# Patient Record
Sex: MALE | Race: WHITE | Employment: OTHER | URBAN - METROPOLITAN AREA
[De-identification: names, ages, dates, MRNs, and addresses within clinical notes are randomized per-mention and may not be internally consistent; named-entity substitution may affect disease eponyms.]

---

## 2022-10-11 ENCOUNTER — HOSPITAL ENCOUNTER (INPATIENT)
Age: 77
LOS: 4 days | Discharge: ANOTHER HOSPITAL | DRG: 885 | End: 2022-10-15
Attending: PSYCHIATRY & NEUROLOGY | Admitting: PSYCHIATRY & NEUROLOGY
Payer: MEDICARE

## 2022-10-11 PROBLEM — F25.9 SCHIZOAFFECTIVE DISORDER (HCC): Status: ACTIVE | Noted: 2022-10-11

## 2022-10-11 LAB
INFLUENZA A: NOT DETECTED
INFLUENZA B: NOT DETECTED
SARS-COV-2 RNA, RT PCR: NOT DETECTED
TSH SERPL DL<=0.05 MIU/L-ACNC: 1.82 UIU/ML (ref 0.27–4.2)

## 2022-10-11 PROCEDURE — 83036 HEMOGLOBIN GLYCOSYLATED A1C: CPT

## 2022-10-11 PROCEDURE — 36415 COLL VENOUS BLD VENIPUNCTURE: CPT

## 2022-10-11 PROCEDURE — 87636 SARSCOV2 & INF A&B AMP PRB: CPT

## 2022-10-11 PROCEDURE — 93005 ELECTROCARDIOGRAM TRACING: CPT | Performed by: PSYCHIATRY & NEUROLOGY

## 2022-10-11 PROCEDURE — 80061 LIPID PANEL: CPT

## 2022-10-11 PROCEDURE — 84443 ASSAY THYROID STIM HORMONE: CPT

## 2022-10-11 PROCEDURE — 1240000000 HC EMOTIONAL WELLNESS R&B

## 2022-10-11 PROCEDURE — 6370000000 HC RX 637 (ALT 250 FOR IP): Performed by: PSYCHIATRY & NEUROLOGY

## 2022-10-11 RX ORDER — POTASSIUM CHLORIDE 20 MEQ/1
20 TABLET, EXTENDED RELEASE ORAL 2 TIMES DAILY
Status: DISCONTINUED | OUTPATIENT
Start: 2022-10-11 | End: 2022-10-12

## 2022-10-11 RX ORDER — MAGNESIUM HYDROXIDE/ALUMINUM HYDROXICE/SIMETHICONE 120; 1200; 1200 MG/30ML; MG/30ML; MG/30ML
30 SUSPENSION ORAL EVERY 6 HOURS PRN
Status: DISCONTINUED | OUTPATIENT
Start: 2022-10-11 | End: 2022-10-15 | Stop reason: HOSPADM

## 2022-10-11 RX ORDER — ACETAMINOPHEN 325 MG/1
650 TABLET ORAL EVERY 4 HOURS PRN
Status: DISCONTINUED | OUTPATIENT
Start: 2022-10-11 | End: 2022-10-15 | Stop reason: HOSPADM

## 2022-10-11 RX ORDER — OLANZAPINE 10 MG/1
10 TABLET ORAL EVERY 4 HOURS PRN
Status: DISCONTINUED | OUTPATIENT
Start: 2022-10-11 | End: 2022-10-15 | Stop reason: HOSPADM

## 2022-10-11 RX ORDER — FERROUS SULFATE 325(65) MG
325 TABLET ORAL
Status: DISCONTINUED | OUTPATIENT
Start: 2022-10-12 | End: 2022-10-12

## 2022-10-11 RX ORDER — ACETAMINOPHEN,DIPHENHYDRAMINE HCL 500; 25 MG/1; MG/1
2 TABLET, FILM COATED ORAL NIGHTLY PRN
Status: DISCONTINUED | OUTPATIENT
Start: 2022-10-11 | End: 2022-10-12

## 2022-10-11 RX ORDER — ACETAMINOPHEN 325 MG/1
650 TABLET ORAL EVERY 6 HOURS PRN
Status: DISCONTINUED | OUTPATIENT
Start: 2022-10-11 | End: 2022-10-15 | Stop reason: HOSPADM

## 2022-10-11 RX ORDER — ASCORBIC ACID 500 MG
500 TABLET ORAL DAILY
Status: DISCONTINUED | OUTPATIENT
Start: 2022-10-11 | End: 2022-10-12

## 2022-10-11 RX ORDER — ATORVASTATIN CALCIUM 10 MG/1
10 TABLET, FILM COATED ORAL NIGHTLY
Status: DISCONTINUED | OUTPATIENT
Start: 2022-10-11 | End: 2022-10-12

## 2022-10-11 RX ORDER — LORAZEPAM 1 MG/1
1 TABLET ORAL EVERY 6 HOURS PRN
Status: DISCONTINUED | OUTPATIENT
Start: 2022-10-11 | End: 2022-10-15 | Stop reason: HOSPADM

## 2022-10-11 RX ORDER — DIVALPROEX SODIUM 125 MG/1
125 CAPSULE, COATED PELLETS ORAL 3 TIMES DAILY
Status: DISCONTINUED | OUTPATIENT
Start: 2022-10-11 | End: 2022-10-12

## 2022-10-11 RX ORDER — METOPROLOL SUCCINATE 50 MG/1
50 TABLET, EXTENDED RELEASE ORAL 2 TIMES DAILY
Status: DISCONTINUED | OUTPATIENT
Start: 2022-10-11 | End: 2022-10-12

## 2022-10-11 RX ORDER — TAMSULOSIN HYDROCHLORIDE 0.4 MG/1
0.4 CAPSULE ORAL DAILY
Status: DISCONTINUED | OUTPATIENT
Start: 2022-10-12 | End: 2022-10-15 | Stop reason: HOSPADM

## 2022-10-11 RX ORDER — BENZTROPINE MESYLATE 1 MG/ML
2 INJECTION INTRAMUSCULAR; INTRAVENOUS 2 TIMES DAILY PRN
Status: DISCONTINUED | OUTPATIENT
Start: 2022-10-11 | End: 2022-10-15 | Stop reason: HOSPADM

## 2022-10-11 RX ORDER — DOCUSATE SODIUM 100 MG/1
100 CAPSULE, LIQUID FILLED ORAL 2 TIMES DAILY
Status: DISCONTINUED | OUTPATIENT
Start: 2022-10-11 | End: 2022-10-15 | Stop reason: HOSPADM

## 2022-10-11 RX ORDER — POLYETHYLENE GLYCOL 3350 17 G
2 POWDER IN PACKET (EA) ORAL
Status: DISCONTINUED | OUTPATIENT
Start: 2022-10-11 | End: 2022-10-15 | Stop reason: HOSPADM

## 2022-10-11 RX ORDER — LEVOTHYROXINE SODIUM 0.03 MG/1
25 TABLET ORAL DAILY
Status: DISCONTINUED | OUTPATIENT
Start: 2022-10-11 | End: 2022-10-12

## 2022-10-11 RX ORDER — OLANZAPINE 10 MG/1
10 TABLET ORAL 2 TIMES DAILY
Status: DISCONTINUED | OUTPATIENT
Start: 2022-10-11 | End: 2022-10-12

## 2022-10-11 RX ORDER — FUROSEMIDE 20 MG/1
20 TABLET ORAL 2 TIMES DAILY
Status: DISCONTINUED | OUTPATIENT
Start: 2022-10-11 | End: 2022-10-12

## 2022-10-11 RX ORDER — HYDROXYZINE 50 MG/1
50 TABLET, FILM COATED ORAL 3 TIMES DAILY PRN
Status: DISCONTINUED | OUTPATIENT
Start: 2022-10-11 | End: 2022-10-15 | Stop reason: HOSPADM

## 2022-10-11 RX ORDER — LANOLIN ALCOHOL/MO/W.PET/CERES
3 CREAM (GRAM) TOPICAL PRN
Status: DISCONTINUED | OUTPATIENT
Start: 2022-10-11 | End: 2022-10-15 | Stop reason: HOSPADM

## 2022-10-11 RX ORDER — NICOTINE 21 MG/24HR
1 PATCH, TRANSDERMAL 24 HOURS TRANSDERMAL DAILY
Status: DISCONTINUED | OUTPATIENT
Start: 2022-10-11 | End: 2022-10-12

## 2022-10-11 RX ORDER — M-VIT,TX,IRON,MINS/CALC/FOLIC 27MG-0.4MG
1 TABLET ORAL 2 TIMES DAILY
Status: DISCONTINUED | OUTPATIENT
Start: 2022-10-12 | End: 2022-10-12

## 2022-10-11 RX ORDER — AMIODARONE HYDROCHLORIDE 200 MG/1
200 TABLET ORAL DAILY
Status: DISCONTINUED | OUTPATIENT
Start: 2022-10-11 | End: 2022-10-12

## 2022-10-11 RX ORDER — TRAZODONE HYDROCHLORIDE 50 MG/1
50 TABLET ORAL NIGHTLY PRN
Status: DISCONTINUED | OUTPATIENT
Start: 2022-10-11 | End: 2022-10-12

## 2022-10-11 RX ORDER — PANTOPRAZOLE SODIUM 20 MG/1
20 TABLET, DELAYED RELEASE ORAL 2 TIMES DAILY
Status: DISCONTINUED | OUTPATIENT
Start: 2022-10-11 | End: 2022-10-12

## 2022-10-11 RX ORDER — BENZONATATE 100 MG/1
100 CAPSULE ORAL 3 TIMES DAILY PRN
Status: DISCONTINUED | OUTPATIENT
Start: 2022-10-11 | End: 2022-10-12

## 2022-10-11 RX ADMIN — DOCUSATE SODIUM 100 MG: 100 CAPSULE, LIQUID FILLED ORAL at 21:39

## 2022-10-11 RX ADMIN — ATORVASTATIN CALCIUM 10 MG: 10 TABLET, FILM COATED ORAL at 21:39

## 2022-10-11 NOTE — BH NOTE
Verbal consent for admission and treatment [facility]  (POA paperwork in med record)

## 2022-10-11 NOTE — BH NOTE
Chay Christianson arrived on the unit per stretcher accompanied by [facility] AN AFFILIATE OF [facility] nurse and hospital security. He was oriented to the unit and to his room then was  transferred to his hospital bed and made comfortable.

## 2022-10-12 ENCOUNTER — APPOINTMENT (OUTPATIENT)
Dept: GENERAL RADIOLOGY | Age: 77
DRG: 885 | End: 2022-10-12
Attending: PSYCHIATRY & NEUROLOGY
Payer: MEDICARE

## 2022-10-12 PROBLEM — N40.0 BENIGN PROSTATIC HYPERPLASIA, PRESENCE OF LOWER URINARY TRACT SYMPTOMS UNSPECIFIED: Status: ACTIVE | Noted: 2022-10-12

## 2022-10-12 PROBLEM — I48.0 PAROXYSMAL ATRIAL FIBRILLATION (HCC): Status: ACTIVE | Noted: 2022-10-12

## 2022-10-12 PROBLEM — E78.00 HYPERCHOLESTEREMIA: Status: ACTIVE | Noted: 2022-10-12

## 2022-10-12 PROBLEM — R56.9 NEW ONSET SEIZURE (HCC): Status: ACTIVE | Noted: 2022-10-12

## 2022-10-12 PROBLEM — E03.8 OTHER SPECIFIED HYPOTHYROIDISM: Status: ACTIVE | Noted: 2022-10-12

## 2022-10-12 LAB
ANION GAP SERPL CALCULATED.3IONS-SCNC: 11 MMOL/L (ref 3–16)
BUN BLDV-MCNC: 30 MG/DL (ref 7–20)
CALCIUM SERPL-MCNC: 8.7 MG/DL (ref 8.3–10.6)
CHLORIDE BLD-SCNC: 96 MMOL/L (ref 99–110)
CHOLESTEROL, TOTAL: 109 MG/DL (ref 0–199)
CO2: 28 MMOL/L (ref 21–32)
CREAT SERPL-MCNC: 1.5 MG/DL (ref 0.8–1.3)
EKG ATRIAL RATE: 53 BPM
EKG DIAGNOSIS: NORMAL
EKG Q-T INTERVAL: 524 MS
EKG QRS DURATION: 148 MS
EKG QTC CALCULATION (BAZETT): 491 MS
EKG R AXIS: 3 DEGREES
EKG T AXIS: 83 DEGREES
EKG VENTRICULAR RATE: 53 BPM
ESTIMATED AVERAGE GLUCOSE: 122.6 MG/DL
GFR AFRICAN AMERICAN: 55
GFR NON-AFRICAN AMERICAN: 46
GLUCOSE BLD-MCNC: 71 MG/DL (ref 70–99)
HBA1C MFR BLD: 5.9 %
HDLC SERPL-MCNC: 61 MG/DL (ref 40–60)
LDL CHOLESTEROL CALCULATED: 37 MG/DL
POTASSIUM REFLEX MAGNESIUM: 5.2 MMOL/L (ref 3.5–5.1)
SODIUM BLD-SCNC: 135 MMOL/L (ref 136–145)
TRIGL SERPL-MCNC: 56 MG/DL (ref 0–150)
VLDLC SERPL CALC-MCNC: 11 MG/DL

## 2022-10-12 PROCEDURE — 97535 SELF CARE MNGMENT TRAINING: CPT

## 2022-10-12 PROCEDURE — 97530 THERAPEUTIC ACTIVITIES: CPT

## 2022-10-12 PROCEDURE — 97166 OT EVAL MOD COMPLEX 45 MIN: CPT

## 2022-10-12 PROCEDURE — 73120 X-RAY EXAM OF HAND: CPT

## 2022-10-12 PROCEDURE — 80048 BASIC METABOLIC PNL TOTAL CA: CPT

## 2022-10-12 PROCEDURE — 99223 1ST HOSP IP/OBS HIGH 75: CPT | Performed by: PSYCHIATRY & NEUROLOGY

## 2022-10-12 PROCEDURE — 1240000000 HC EMOTIONAL WELLNESS R&B

## 2022-10-12 PROCEDURE — 6370000000 HC RX 637 (ALT 250 FOR IP): Performed by: NURSE PRACTITIONER

## 2022-10-12 PROCEDURE — 6370000000 HC RX 637 (ALT 250 FOR IP): Performed by: PSYCHIATRY & NEUROLOGY

## 2022-10-12 PROCEDURE — 99222 1ST HOSP IP/OBS MODERATE 55: CPT | Performed by: NURSE PRACTITIONER

## 2022-10-12 PROCEDURE — 93010 ELECTROCARDIOGRAM REPORT: CPT | Performed by: INTERNAL MEDICINE

## 2022-10-12 PROCEDURE — 36415 COLL VENOUS BLD VENIPUNCTURE: CPT

## 2022-10-12 RX ORDER — METOPROLOL SUCCINATE 50 MG/1
100 TABLET, EXTENDED RELEASE ORAL NIGHTLY
Status: DISCONTINUED | OUTPATIENT
Start: 2022-10-12 | End: 2022-10-12

## 2022-10-12 RX ORDER — PANTOPRAZOLE SODIUM 20 MG/1
20 TABLET, DELAYED RELEASE ORAL
Status: DISCONTINUED | OUTPATIENT
Start: 2022-10-12 | End: 2022-10-15 | Stop reason: HOSPADM

## 2022-10-12 RX ORDER — M-VIT,TX,IRON,MINS/CALC/FOLIC 27MG-0.4MG
1 TABLET ORAL DAILY
Status: DISCONTINUED | OUTPATIENT
Start: 2022-10-13 | End: 2022-10-15 | Stop reason: HOSPADM

## 2022-10-12 RX ORDER — LEVOTHYROXINE SODIUM 0.12 MG/1
125 TABLET ORAL DAILY
Status: DISCONTINUED | OUTPATIENT
Start: 2022-10-13 | End: 2022-10-15 | Stop reason: HOSPADM

## 2022-10-12 RX ORDER — QUETIAPINE FUMARATE 25 MG/1
25 TABLET, FILM COATED ORAL NIGHTLY
Status: DISCONTINUED | OUTPATIENT
Start: 2022-10-12 | End: 2022-10-15 | Stop reason: HOSPADM

## 2022-10-12 RX ORDER — METOPROLOL SUCCINATE 50 MG/1
50 TABLET, EXTENDED RELEASE ORAL NIGHTLY
Status: DISCONTINUED | OUTPATIENT
Start: 2022-10-12 | End: 2022-10-15

## 2022-10-12 RX ORDER — FUROSEMIDE 40 MG/1
40 TABLET ORAL DAILY
Status: DISCONTINUED | OUTPATIENT
Start: 2022-10-13 | End: 2022-10-15 | Stop reason: HOSPADM

## 2022-10-12 RX ORDER — ATORVASTATIN CALCIUM 10 MG/1
20 TABLET, FILM COATED ORAL NIGHTLY
Status: DISCONTINUED | OUTPATIENT
Start: 2022-10-12 | End: 2022-10-15 | Stop reason: HOSPADM

## 2022-10-12 RX ORDER — OLANZAPINE 10 MG/1
10 TABLET ORAL NIGHTLY
Status: DISCONTINUED | OUTPATIENT
Start: 2022-10-12 | End: 2022-10-15 | Stop reason: HOSPADM

## 2022-10-12 RX ORDER — POTASSIUM CHLORIDE 20 MEQ/1
20 TABLET, EXTENDED RELEASE ORAL 2 TIMES DAILY
Status: DISCONTINUED | OUTPATIENT
Start: 2022-10-12 | End: 2022-10-14

## 2022-10-12 RX ORDER — POTASSIUM CHLORIDE 20 MEQ/1
40 TABLET, EXTENDED RELEASE ORAL 2 TIMES DAILY
Status: DISCONTINUED | OUTPATIENT
Start: 2022-10-12 | End: 2022-10-12

## 2022-10-12 RX ADMIN — OXYCODONE HYDROCHLORIDE AND ACETAMINOPHEN 500 MG: 500 TABLET ORAL at 09:06

## 2022-10-12 RX ADMIN — ACETAMINOPHEN 650 MG: 325 TABLET ORAL at 21:28

## 2022-10-12 RX ADMIN — TAMSULOSIN HYDROCHLORIDE 0.4 MG: 0.4 CAPSULE ORAL at 09:06

## 2022-10-12 RX ADMIN — OLANZAPINE 10 MG: 10 TABLET, FILM COATED ORAL at 20:33

## 2022-10-12 RX ADMIN — QUETIAPINE FUMARATE 25 MG: 25 TABLET ORAL at 20:34

## 2022-10-12 RX ADMIN — ATORVASTATIN CALCIUM 20 MG: 10 TABLET, FILM COATED ORAL at 20:34

## 2022-10-12 RX ADMIN — SODIUM ZIRCONIUM CYCLOSILICATE 5 G: 5 POWDER, FOR SUSPENSION ORAL at 17:09

## 2022-10-12 RX ADMIN — DOCUSATE SODIUM 100 MG: 100 CAPSULE, LIQUID FILLED ORAL at 09:06

## 2022-10-12 RX ADMIN — DOCUSATE SODIUM 100 MG: 100 CAPSULE, LIQUID FILLED ORAL at 20:33

## 2022-10-12 RX ADMIN — PANTOPRAZOLE SODIUM 20 MG: 20 TABLET, DELAYED RELEASE ORAL at 16:12

## 2022-10-12 RX ADMIN — LORAZEPAM 1 MG: 1 TABLET ORAL at 04:00

## 2022-10-12 RX ADMIN — ASPIRIN 325 MG: 325 TABLET, COATED ORAL at 09:06

## 2022-10-12 RX ADMIN — MULTIPLE VITAMINS W/ MINERALS TAB 1 TABLET: TAB at 09:06

## 2022-10-12 ASSESSMENT — PAIN DESCRIPTION - LOCATION: LOCATION: BACK

## 2022-10-12 ASSESSMENT — SLEEP AND FATIGUE QUESTIONNAIRES
AVERAGE NUMBER OF SLEEP HOURS: 6
DO YOU HAVE DIFFICULTY SLEEPING: YES
DO YOU USE A SLEEP AID: NO
DO YOU HAVE DIFFICULTY SLEEPING: NO
DO YOU USE A SLEEP AID: NO
SLEEP PATTERN: DIFFICULTY FALLING ASLEEP;DISTURBED/INTERRUPTED SLEEP;RESTLESSNESS;EARLY AWAKENING

## 2022-10-12 ASSESSMENT — LIFESTYLE VARIABLES
HOW OFTEN DO YOU HAVE A DRINK CONTAINING ALCOHOL: NEVER
HOW MANY STANDARD DRINKS CONTAINING ALCOHOL DO YOU HAVE ON A TYPICAL DAY: PATIENT DOES NOT DRINK

## 2022-10-12 ASSESSMENT — PAIN - FUNCTIONAL ASSESSMENT
PAIN_FUNCTIONAL_ASSESSMENT: WONG-BAKER FACES
PAIN_FUNCTIONAL_ASSESSMENT: WONG-BAKER FACES

## 2022-10-12 ASSESSMENT — PAIN DESCRIPTION - ORIENTATION: ORIENTATION: LOWER

## 2022-10-12 ASSESSMENT — PAIN DESCRIPTION - DESCRIPTORS: DESCRIPTORS: OTHER (COMMENT)

## 2022-10-12 NOTE — PROGRESS NOTES
10/12/22 1511   Encounter Summary   Encounter Overview/Reason  Initial Encounter;Spiritual/Emotional Needs   Service Provided For: Patient   Referral/Consult From: [address] Family members   Last Encounter  10/12/22   Complexity of Encounter Low   Begin Time 1330   End Time  1350   Total Time Calculated 20 min   Crisis   Type Family Care   Spiritual/Emotional needs   Type Spiritual Support;Emotional Distress

## 2022-10-12 NOTE — GROUP NOTE
Group Therapy Note    Date: 10/12/2022    Group Start Time: 1100  Group End Time: 3904  Group Topic: [address] [facility]        Group Therapy Note    Mode of Intervention: Live Pt-Preferred music, group socialization/reminiscence    Attendees: 5         Notes: This pt was present and actively engaged across group interventions, observed singing along throughout while socializing intermittently. Status After Intervention:  Improved    Participation Level:  Active Listener and Interactive    Participation Quality: Appropriate and Attentive      Speech:  normal      Thought Process/Content: Logical      Affective Functioning: Congruent      Mood: euthymic      Level of consciousness:  Alert and Oriented x4      Response to Learning: Capable of insight and Progressing to goal      Endings: None Reported    Modes of Intervention: Support, Socialization, Exploration, Clarifying, Activity, and Media      Discipline Responsible: Psychoeducational Specialist      Signature:  Quin Jolly MM, MT-BC

## 2022-10-12 NOTE — BH NOTE
Emilio's bed alarm sounded and staff immediately went to check on his welfare. Romina Islas wanted to get up and eat dinner. As staff was assisting Romina Islas it was clear that he was incontinent of urine. His depends and all of the pads on the bed were wet. Romina Islas was able to stand beside the bed so that staff could change his brief. He was also able to take some small steps so he could properly be positioned on the bed. Romina Islas wanted to sit on the side of the bed and eat dinner. He was able to eat 90% of dinner and he fed himself. After eating, Romina Islas wanted to go out into the day room. He was able to get up, using the Steady, and sit in a recliner.

## 2022-10-12 NOTE — FLOWSHEET NOTE
Senior Purposeful Rounding    Position:Repositions Self     Physical Environment:Room free from clutter, Clear path to bathroom , Adequate lighting, and Bed alarm functioning     Pain Rating/ Nonverbal Pain Behaviors:0;      Pain interventions Attempted: None observed     Patient Toileted: Bowel Movement

## 2022-10-12 NOTE — PLAN OF CARE
Pt. Is alert and oriented x2, received medications, good appetite, cooperative, working with PT and OT, social with peers and staff, denies SI HI AVH, no aggressive or combative behaviors.

## 2022-10-12 NOTE — FLOWSHEET NOTE
Senior Purposeful Rounding    Position:Sitting    Physical Environment:No safety hazards noted, fall precautions in place    Pain Rating/ Nonverbal Pain Behaviors:denies    Pain interventions Attempted    Patient Toileted:Continent

## 2022-10-12 NOTE — BH NOTE
In attempt to verify medications, this nurse called the [facility] which is listed in the Home medications section of the chart. The [facility] informed this nurse that the last time that pharmacy filled medications for Saima Farley was 4/14/2020. This information was inserted into a comment in the home medication list.      The following list of medications is from   [address] [place] [address]. This MAR is a paper record of medication administration and is dated 10/5 to 10/12. Atorvastatin (Lipitor)  10 mg PO daily at hs    Furosemide (Lasix) 40 mg PO daily. CHF. Levothyroxine Sodium 125 mcg PO daily     Lisinopril  10 mg PO daily    Melatonin  10 mg PO at hs     Metoprolol (Toprol XL) 100 mg PO daily. Afib    Olanzapine (Zyprexa) 5 mg PO BID    Pantoprazole Sodium (Protonix) 40 mg PO BID    Potassium Chloride (Klor Con M20) 20 meq PO daily.      Tamsulosin HCL (Flomax) 0.4 mg PO daily    Acetaminophen (Tylenol) 650 mg PO q 6 hours PRN    Depakote 250 mg PO BID    Zoloft 50 mg PO daily

## 2022-10-12 NOTE — FLOWSHEET NOTE
Senior Purposeful Rounding    Position:Sitting     Physical Environment:Room free from clutter     Pain Rating/ Nonverbal Pain Behaviors:0;      Pain interventions Attempted: None observed     Patient Toileted:No- Void

## 2022-10-12 NOTE — PROGRESS NOTES
Senior Purposeful Rounding    Position:Sitting    Physical Environment:Room free from clutter    Pain Rating/ Nonverbal Pain Behaviors:0;     Pain interventions Attempted: n/a    Patient Toileted:No- Void

## 2022-10-12 NOTE — GROUP NOTE
Group Therapy Note    Date: 10/12/2022    Group Start Time: 1310  Group End Time: 1400  Group Topic: Recreational    [address] [facility],[address], LIS[address]        Group Therapy Note    Attendees: 3    Participants worked together as a team to create decorations for the gratitude tree on the unit. Notes:  Franchesca Eubanks attended group and was engaged in drawing things to put with the gratitude tree. Franchesca Eubanks joan a pumpkin and a Doris tree and he appeared to be enjoying himself with his peers.      Status After Intervention:  Improved    Participation Level: Interactive    Participation Quality: Appropriate, Attentive, and Sharing      Speech:  slurred      Thought Process/Content: Logical      Affective Functioning: Congruent      Mood: euthymic      Level of consciousness:  Alert and Attentive      Response to Learning: Progressing to goal      Endings: None Reported    Modes of Intervention: Education and Activity      Discipline Responsible: /Counselor      Signature:  MARCUS Pedroza

## 2022-10-12 NOTE — FLOWSHEET NOTE
Senior Purposeful Rounding    Position:Repositions Self     Physical Environment:Room free from clutter, Clear path to bathroom , Adequate lighting, and Bed alarm functioning     Pain Rating/ Nonverbal Pain Behaviors:0;      Pain interventions Attempted: n/a     Patient Toileted:Continent

## 2022-10-12 NOTE — PROGRESS NOTES
Senior Purposeful Rounding    Position:Back    Physical Environment:Room free from clutter, Clear path to bathroom , Adequate lighting, Bed alarm functioning, and No safety hazards noted    Pain Rating/ Nonverbal Pain Behaviors:0;     Pain interventions Attempted: n/a    Patient Toileted:No- Void

## 2022-10-12 NOTE — PROGRESS NOTES
4 Eyes Skin Assessment     The patient is being assessed for  Admission    I agree that 2 RN's have performed a thorough Head to Toe Skin Assessment on the patient. ALL assessment sites listed below have been assessed. Areas assessed for pressure by both nurses:   [x]   Head, Face, and Ears   [x]   Shoulders, Back, and Chest  [x]   Arms, Elbows, and Hands   [x]   Coccyx, Sacrum, and Ischum  [x]   Legs, Feet, and Heels                                Skin Assessed Under all Medical Devices by both nurses:  N/A               All Mepilex Borders were peeled back and area peeked at by both nurses:  N/A  Please list where Mepilex Borders are located:  N/A                 Does the Patient have Skin Breakdown related to pressure?   No          Alvin Prevention initiated:  NA   Wound Care Orders initiated:  NA      [facility] nurse consulted for Pressure Injury (Stage 3,4, Unstageable, DTI, NWPT, Complex wounds)and New or Established Ostomies:  NA        Nurse 1 eSignature: Electronically signed by Renetta Norton RN on 10/12/22 at 1:30 AM EDT    **SHARE this note so that the co-signing nurse is able to place an eSignature**    Nurse 2 eSignature: Electronically signed by Abner Ivy RN on 10/12/22 at 1:33 AM EDT

## 2022-10-12 NOTE — PROGRESS NOTES
Inpatient Occupational Therapy  Evaluation and Treatment    Unit:   [facility]  Date:  10/12/2022  Patient Name:    Blue Knight  Admitting diagnosis:  Schizoaffective disorder [facility]) [F25.9]  Admit Date:  10/11/2022  Precautions/Restrictions/WB Status/ Lines/ Wounds/ Oxygen:  Standard [facility] Precautions  Fall Risk, [place]  History of Present Illness:  Pt transferred from [address] [place] [address] on 10/11. Per shift report, staff at other facility went to place glynn in pt, and prostate swelling made this difficult, which made pt \"aggressive\". Decision was made to medicate him and straight cath him q 8. Pt presented voluntarily for [facility] for a rule out of schizoaffective dx, bipolar type, and schizophrenia dx. He was observed talking to someone who was not present, had VH with running water when turning off the TV, and states he sees a woman and other people running around outside in the woods. Has past hx of impulsivity, failed outpt tx, past dx of bipolar dx and schizophrenia. Denies previous inpt hospitalizations or legal hx. Pt's sister is Vamsi Combs RN on [facility]. Treatment Number:  1    Treatment Time: [id number]  Timed Code Treatment Minutes:    70 minutes   Total Treatment Time:    80  minutes    Staff Recommendations:    Assist of 1 with transfers bed to w/c, with SBA of another using RW    Discharge Recommendations:  SNF    DME needs for discharge:      defer to facility     AM-PAC Score:   14  Home Health S4 Level: NA    Subjective: Pt was found seated in the common room-was receptive to OT treatment. MOCA:  [place] [facility] Assessment ([address] [place] [address] Street, Ne)  (See pt folder behind nurses station for copy of assessment while pt is admitted.)   Total Score: Sum of all subscores listed on the right-hand side. Add one point for an individual who has 12 years or fewer of formal education, for a possible maximum of 30 points.  A final total score of 26 and above is considered normal.     Education Level HS Visuospatial/Executive  1/5   Naming  3/3   Attention  2/6   Language 1/3   Abstraction  2/2   Delayed Recall    MIS = 6/15   0/5   Orientation  5/6   (additional point for <12 grade educations)     Total Score    14/30         Preadmission Environment    Pt. Lives with his brother Scarlett Walters, who is mentally disabled  Home environment:  one story home condominium  Steps to enter first floor: No steps  Steps to second floor: N/A  Bathroom: walk in shower, grab bars, and shower toilet, elevated commode  Equipment owned: RW, wc (manual), shower chair/bench, BSC, and hospital bed    Preadmission Status:  Pt. Able to drive: No-family drives   Pt Fully independent with ADLs: Yes-brother sets up shower, help him dry off, and don socks and shoes, and donning his pants  Pt. Required assistance from family for: Bathing, Cleaning, Dressing, and Laundry ; pt reports he cooks  Pt. independent for transfers and gait and walked with An Tijerina, during hospitalization in July he needed  History of falls Yes-reports \"lot of falls\", one in the last month    IADLs:  Sleep Hygiene:  to be assessed  Income: to be assessed  Financial Management:  to be assessed  Leisure Interests:  to be assessed  Medication Management: to be assessed  Health Management:  to be assessed  Social Network:  to be assessed  Stressors:  to be assessed  Coping Skills: to be assessed    Self Care/ADLs:  Toileting:  Max A  Grooming: Not Tested  Dressing: Max A to pull pants up/down and Dep to don new brief  Bathing: Not Tested  Self-feeding: Not Tested    Pain   Yes  Rating: Minimal  Location: shoulder with ROM  Pain Medicine Status: No request made      Cognition  Verbal processing- garbled speech, needed directions repeated  Reports visual hallucinations ( a head above the computer screen, movement in the corner of the room)-nursing was notified  A&O to Person, Place, and Time  Able to follow:  1 step commands with repetition at times    Upper Extremity ROM: Impaired bilateral shoulder flexion R80 with compensation, L20  Abd R90 L45  Decreased  in R hand-can make approx. 50% fist ROM    Upper Extremity Strength:    WFL, pt able to perform all bed mobility, transfers, and gait without strength limitation. Upper Extremity Sensation:    No apparent deficits. Upper Extremity Proprioception:    No apparent deficits. Skin Integrity:  No issues noted     Coordination and Tone:  No issues noted    Balance  Static Sitting:  Good   Dynamic Sitting: Fair +  Static Standing:  Fair + with bilateral support  Dynamic Standing: Not tested    Bed mobility:    Supine to sit:   Not Tested  Sit to supine:   Not Tested  Scooting to head of bed: Not Tested   Scooting in sitting:  Min A  Rolling:   Not Tested  Bridging:   Not Tested    Transfers:    Sit to stand:   Min A with RW or grab bar  Stand to sit:   CGA and verbal cues  Bed/Chair to/from toilet: Not Tested      Exercise / Activities Initiated:   Pt. Educated on role of OT. Pt. Participated in: toileting, MOCA, functional transfer training    Assessment of Deficits: Pt reports he has an attempted suicide 6 months ago by taking too many sleeping pills. He denies any current suicidal thoughts. Pt demonstrated decreased activity tolerance, decreased safety awareness, decreased strength, decreased ROM, decreased balance,  decreased bed mobility, decreased transfer skills, and decreased ADL/IADL status, decreased coping skills, decreased cognition, self isolation, limited education  Recommending SNF upon discharge as patient functioning well below baseline, demonstrates good rehab potential and unable to return home due to limited or no family support, inability to negotiate stairs to enter home/bedroom/bathroom and home environment not conducive to patient recovery. Pt. Limited during evaluation by . . . EMELY, difficulty speaking    At end of evaluation, pt. In gathering room. Goal(s) : To be met in 3 Visits:  1). Assess IADLs and write goals as appropriate  2). Pt will participate in [facility] [place] [facility] assessment. 3). Pt will perform 2 grooming tasks with setup. To be met in 5 Visits:  1). Supine to Sit IND  2). Bed to Chair/BSC with CGA  3). Upper Body Dressing IND  4). Lower Body Dressing  Mod A  5). Pt to jeaneth UE exs [id number]  6). Pt to complete 3 ADL activities prior to lunchtime. (1. Brush hair, 2. Wash face, 3. Brush teeth)      Rehabilitation Potential:  Good for goals listed above. Strengths for achieving goals include:  PLOF and Motivation  Barriers to achieving goals include: Decreased cognition and Limited education    Plan: To be seen 2-5x/week while in acute care setting for therapeutic exercises/act, ADL retraining, NMR and patient/caregiver ed/instruction.        Signature and License Number  Melvin Vasquez Stephany Feliciano 89, OTR/L  #[id number]           If patient discharges from this facility prior to next visit, this note will serve as the Discharge Summary

## 2022-10-12 NOTE — PROGRESS NOTES
Senior Purposeful Rounding    Position:Back    Physical Environment:Room free from clutter, Clear path to bathroom , Adequate lighting, and Bed alarm functioning    Pain Rating/ Nonverbal Pain Behaviors:0;     Pain interventions Attempted: n/a    Patient Toileted:No- Void

## 2022-10-12 NOTE — FLOWSHEET NOTE
Senior Purposeful Rounding    Position:Repositions Self     Physical Environment:Room free from clutter, Clear path to bathroom , Adequate lighting, and Bed alarm functioning     Pain Rating/ Nonverbal Pain Behaviors:0;      Pain interventions Attempted: n/a     Patient Toileted:No- Void

## 2022-10-12 NOTE — H&P
Hospital Medicine History & Physical      PCP: Shantelle Pineda MD    Date of Admission: 10/11/2022    Date of Service: Pt seen/examined on 10/12/22     Chief Complaint:  Hallucinations    History Of Present Illness: The patient is a 78 y.o. male with PMH  of atrial fibrillation CAD, cardiomyopathy, CHF, CKD, HTN, HLD and seizures who presented to [address] [place] [address] for hallucinations, delusions. Patient was seen and evaluated in the ED by the ED medical provider, patient was medically cleared for admission to senior behavioral health center at [facility]. This note serves as an admission medical H&P. Pt HPI is limited due to patient being a poor historian. Tobacco use: denies  ETOH use: denies  Illicit drug use: denies    Patient does complain of right 2nd digit pain. Stated he hit it on something yesterday and has been sores since then. Past Medical History:        Diagnosis Date    Acute kidney injury ([address]     Anemia     Hx of     Arthritis     Atrial fibrillation ([address]     Atrial flutter ([address]     converted to NSR, Poor candidate for anticoagulation. CAD (coronary artery disease)     Cardiomyopathy ([address]     ? Nonishcemic, Echo 10/2014 LVEF 25-30%. Cellulitis of right upper extremity     CHF (congestive heart failure) (HCC)     systolic    CKD (chronic kidney disease)     Cr 2.3 10/2014, not on ACE/ARB due to CKD    Hypertension     Hypovolemia     PVC's (premature ventricular contractions)     secondary to hypokalemia    Schizophrenia (HCC)     hx of    Seizures (HCC)     Urinary tract infection        Past Surgical History:        Procedure Laterality Date    APPENDECTOMY      TURP         Medications Prior to Admission:    Prior to Admission medications    Medication Sig Start Date End Date Taking? Authorizing Provider   amiodarone (CORDARONE) 200 MG tablet Take 1 tablet by mouth daily.  4/2/15   Andrew Cabral MD   benzonatate (TESSALON) 100 MG capsule Take 100 mg by mouth 3 times daily as needed for Cough. Historical Provider, MD   docusate sodium (COLACE) 100 MG capsule Take 100 mg by mouth 2 times daily. Historical Provider, MD   aspirin 325 MG EC tablet Take 325 mg by mouth daily. Historical Provider, MD   ferrous sulfate 325 (65 FE) MG tablet Take 325 mg by mouth daily (with breakfast). Historical Provider, MD   tamsulosin (FLOMAX) 0.4 MG capsule Take 0.4 mg by mouth daily. Historical Provider, MD   simvastatin (ZOCOR) 20 MG tablet Take 20 mg by mouth nightly. Historical Provider, MD   levothyroxine (SYNTHROID) 25 MCG tablet Take 25 mcg by mouth Daily. Historical Provider, MD   ascorbic acid (VITAMIN C) 500 MG tablet Take 500 mg by mouth daily. Historical Provider, MD   Nutritional Supplements (ARGINAID EXTRA) LIQD by Does not apply route. Historical Provider, MD   potassium chloride SA (K-DUR;KLOR-CON M) 20 MEQ tablet Take 20 mEq by mouth 2 times daily. Historical Provider, MD   furosemide (LASIX) 20 MG tablet Take 20 mg by mouth 2 times daily. Historical Provider, MD   metoprolol (TOPROL-XL) 50 MG XL tablet Take 50 mg by mouth 2 times daily. Historical Provider, MD   Multiple Vitamins-Minerals (THERAPEUTIC MULTIVITAMIN-MINERALS) tablet Take 1 tablet by mouth 2 times daily. Historical Provider, MD   pantoprazole (PROTONIX) 20 MG tablet Take 20 mg by mouth 2 times daily. Historical Provider, MD   OLANZapine (ZYPREXA) 10 MG tablet Take 10 mg by mouth 2 times daily. Historical Provider, MD   divalproex (DEPAKOTE SPRINKLE) 125 MG capsule Take 125 mg by mouth 3 times daily. Historical Provider, MD   LORazepam (ATIVAN) 1 MG tablet Take 1 mg by mouth every 6 hours as needed for Anxiety. Historical Provider, MD   melatonin 3 MG TABS tablet Take 3 mg by mouth as needed. Historical Provider, MD   diphenhydrAMINE-APAP, sleep, (TYLENOL PM EXTRA STRENGTH)  MG tablet Take 2 tablets by mouth nightly as needed for Sleep.     Historical Provider, MD   acetaminophen (TYLENOL) 325 MG tablet Take 650 mg by mouth every 6 hours as needed for Pain. Historical Provider, MD       Allergies:  Penicillins    Social History:  The patient currently lives with family     TOBACCO:   reports that he has quit smoking. His smoking use included cigarettes. He has a 60.00 pack-year smoking history. He does not have any smokeless tobacco history on file. ETOH:   reports no history of alcohol use. Family History:   Positive as follows:        Problem Relation Age of Onset    Arthritis Mother     Heart Disease Father        REVIEW OF SYSTEMS:     Constitutional: Negative for fever   HENT: Negative for sore throat   Eyes: Negative for redness   Respiratory: Negative  for dyspnea, cough   Cardiovascular: Negative for chest pain   Gastrointestinal: Negative for vomiting, diarrhea   Genitourinary: Negative for hematuria   Musculoskeletal: +arthralgias   Skin: Negative for rash   Neurological: Negative for syncope    Hematological: +bruising/bleeding   Psychiatric/Behavorial: Per psychiatry team evaluation     PHYSICAL EXAM:    /68   Pulse 55   Temp 98.1 °F (36.7 °C) (Oral)   Resp 18   Wt 180 lb 3.2 oz (81.7 kg)   SpO2 97%   BMI 25.13 kg/m²     Gen: No distress. Alert. Pleasant elderly male  Eyes: PERRL. No sclera icterus. No conjunctival injection. ENT: No discharge. Pharynx clear. Neck: No JVD. Trachea midline. Resp: No accessory muscle use. No crackles. No wheezes. No rhonchi. CV: Regular rate. Regular rhythm. No murmur. No rub. No edema. GI: Non-tender. Non-distended. Normal bowel sounds. Skin: Warm and dry. No nodule on exposed extremities. No rash on exposed extremities. M/S: No cyanosis. + joint deformity in bilateral hands from arthritis. Neuro: Awake. No focal neurologic deficit on exam.  Cranial nerves II through XII intact.   Patient is in wheelchair unable to walk at this time   Psych: Per psychiatry team evaluation     CBC: No results for input(s): WBC, HGB, HCT, MCV, PLT in the last 72 hours. BMP: No results for input(s): NA, K, CL, CO2, PHOS, BUN, CREATININE, CA in the last 72 hours. LIVER PROFILE: No results for input(s): AST, ALT, LIPASE, BILIDIR, BILITOT, ALKPHOS in the last 72 hours. Invalid input(s): AMYLASE,  ALB  PT/INR: No results for input(s): PROTIME, INR in the last 72 hours. APTT: No results for input(s): APTT in the last 72 hours. UA:No results for input(s): NITRITE, COLORU, PHUR, LABCAST, WBCUA, RBCUA, MUCUS, TRICHOMONAS, YEAST, BACTERIA, CLARITYU, SPECGRAV, LEUKOCYTESUR, UROBILINOGEN, BILIRUBINUR, BLOODU, GLUCOSEU, AMORPHOUS in the last 72 hours. Invalid input(s): Beryle Sandman     U/A:  No results found for: NITRITE, COLORU, WBCUA, RBCUA, MUCUS, BACTERIA, CLARITYU, SPECGRAV, LEUKOCYTESUR, BLOODU, GLUCOSEU, AMORPHOUS    CULTURES  COVID/Influenza: not detected      EKG:  I have reviewed the EKG with the following interpretation:   Normal sinus rhythm with 1st degree A-V blockLow voltageLeft bundle branch blockAbnormal ECGNo previous ECGs availableConfirmed by ROSALIO YODER MD ([id number]) on 10/12/2022 7:49:37 AM    RADIOLOGY  XR HAND RIGHT (2 VIEWS)    (Results Pending)     Echo 7/5/2022    * Left ventricular chamber dimension is normal.     * Left ventricular function is mildly reduced with an estimated ejection   fraction of 40-45%. * Left ventricular segmental wall motion is abnormal.     * Right ventricular systolic function is normal.     * Unable to estimate pulmonary arterial systolic pressure due to lack of   tricuspid regurgitation jet. * There is mild aortic valve regurgitation. * The aortic arch is mildly dilated. * The basal inferior wall, mid inferior wall, basal anterolateral wall, mid   anterolateral wall, basal inferolateral wall, and mid inferolateral wall are   hypokinetic.      * All other walls appear normal.        ASSESSMENT/PLAN:  Schizophrenia   - per psychiatry team    HTN  - controlled continue home meds:Toprol     GERD  - Continue PPI      Hypothyroidism  - home dose of synthroid 125 mcg      sCHF without AE  - appears compensated  - echo as above  - continue Lasix and potassium 20 mEq BID  - not on ACEi/ARB, likely 2/2 recent [place] on CKD  - check BMP  - monitor for s/sx of decompensation   - follow up outpatient     CKD  - baseline appears to be 1.1-1.3  - repeat BMP today  - monitor     HLD  - Continue statin      PAF  - appears to not be on amiodarone any longer  - was on toprol xl 100  mg  - pt noted to be bradycardic, reduced to 50 mg and monitor   [facility]      Urinary retention  BPH  - per nursing did have glynn in for this  - removed it due to causing more pain for patient  - started on Flomax  - has been urinating well  - nursing to monitor ans straight cath as needed     Hx of Seizures   ? If on Depakote in the past, unsure why stopped  - monitor     Right 2nd digit pain  - x-ray ordered   - monitor     Pt has no medical complaints at this time. They were informed that should a medical concern arise during their admission they may have [facility] contact us.        Chari Heller, CHRISTIAN - CNP   10/12/2022 3:11 PM

## 2022-10-12 NOTE — PROGRESS NOTES
[facility]  Admission Note     Admission Type: Involuntary     Reason for admission:  Hallucinations; rule out schizoaffective dx, bipolar type, and rule out schizophrenia dx       Addictive Behavior:   N/A     Medical Problems:   Past Medical History:   Diagnosis Date    Acute kidney [facility] ([facility]     Anemia     Hx of     Arthritis     Atrial fibrillation ([facility]     Atrial flutter ([facility]     converted to NSR, Poor candidate for anticoagulation. CAD (coronary artery disease)     Cardiomyopathy ([facility]     ? Nonishcemic, Echo 10/2014 LVEF 25-30%. Cellulitis of right upper extremity     CHF (congestive heart failure) ([facility])     systolic    CKD (chronic kidney disease)     Cr 2.3 10/2014, not on ACE/ARB due to CKD    Hypertension     Hypovolemia     PVC's (premature ventricular contractions)     secondary to hypokalemia    Schizophrenia ([facility])     hx of    Seizures ([facility])     Urinary tract infection        Status EXAM:  Mental Status and Behavioral Exam  Normal: Yes  Level of Assistance: Partial assist  Facial Expression: Brightened  Affect: Appropriate  Level of Consciousness: Confused  Frequency of Checks: 4 times per hour, close  Mood:Normal: Yes  Motor Activity:Normal: Yes  Eye Contact: Good  Observed Behavior: Cooperative, Friendly  Sexual Misconduct History: Current - no  Preception: [place] to person, [place] to place, [place] to time  Attention:Normal: Yes  Thought Processes: Blocking  Thought Content:Normal: Yes  Depression Symptoms: No problems reported or observed. Anxiety Symptoms: No problems reported or observed. Jessa Symptoms: No problems reported or observed. Hallucinations:  Auditory (comment), Visual (comment) (did not witness AVH, but reported to previous hospital)  Delusions: No  Memory:Normal: No  Memory: Poor recent  Insight and Judgment: No  Insight and Judgment: Poor judgment, Poor insight    Tobacco Screening:  Practical Counseling, on admission, raine X, if applicable and completed (first 3 are required if patient doesn't refuse):            ( ) Recognizing danger situations (included triggers and roadblocks)                    ( ) Coping skills (new ways to manage stress,relaxation techniques, changing routine, distraction)                                                           ( ) Basic information about quitting (benefits of quitting, techniques in how to quit, available resources  ( ) Referral for counseling faxed to [facility]                                                                                                                   ( ) Patient refused counseling  ( X) Patient has not smoked in the last 30 days    Metabolic Screening:    No results found for: LABA1C    No results found for: CHOL  No results found for: TRIG  No results found for: HDL  No components found for: LDLCAL  No results found for: LABVLDL      Body mass index is 25.13 kg/m². BP Readings from Last 2 Encounters:   10/11/22 (!) 96/58   12/16/14 100/60           Pt admitted with followings belongings:  Dental Appliances: None  Vision - Corrective Lenses: Eyeglasses  Hearing Aid: None  Jewelry: None  Body Piercings Removed: N/A  Clothing: Shirt, Undergarments, Other (Comment) (in locker)  Other Valuables:  At home    Renetta Norton RN

## 2022-10-12 NOTE — PROGRESS NOTES
Senior Purposeful Rounding    Position:Repositions Self    Physical Environment:Room free from clutter, Clear path to bathroom , Adequate lighting, and Bed alarm functioning    Pain Rating/ Nonverbal Pain Behaviors:0;     Pain interventions Attempted: n/a    Patient Toileted: Bowel Movement

## 2022-10-12 NOTE — CARE COORDINATION
10/12/22 1607   Psychiatric History   Psychiatric history treatment Psychiatric admissions;Current treatment  (Current through [place] [facility] in [facility], last admission was in the 's)   Are there any medication issues? Yes  (recent changes)   Support System   Support system Primary support persons   Types of Support System Sister  (and other extended family)   Problems in support system None   Current Living Situation   Home Living Adequate   Living information Lives with others  (with his brother who has developmental disabilities)   Problems with living situation  No   Lack of basic needs No   SSDI/SSI Full disability from [place]   Other government assistance Unknown   Problems with environment None   Current abuse issues None   Supervised setting Family supervision   Relationship problems No   Contact information N/A   Medical and Self-Care Issues   Relevant medical problems heart conditions. Relevant self-care issues uses a wheel chair   Barriers to treatment No   Family Constellation   Spouse/partner-name/age N/A   Children-names/ages N/A   Parents , was close with them, especially close with his mother   Siblings he is 3 of 5 children, older brother is , 3 sisters live out of town, has support and regular interaction from remaining siblings.    Contact information N/A   Support services   (N/A)   Comment N/A   Childhood   Raised by Biological mother;Biological father   Biological mother , pt was very close with her, lived with her most of his life   Biological father    Relevant family history 1 brother has significant developmental delays   History of abuse No   Comment N/A   Legal History   Legal history No   Other relevant legal issues N/A   Comment N/A   Juvenile legal history No   Abuse Assessment   Physical Abuse Denies   Verbal Abuse Denies   Emotional abuse Denies   Financial Abuse Denies   Sexual abuse Denies   Possible abuse reported to None needed   Substance Use Use of substances  No   Motivation for SA Treatment   Stage of engagement   (N/A)   Motivation for treatment   (N/A)   Current barriers to treatment   (N/A)   Comment N/A   Education   Education HS graduate -GED   Special education   (N/A)   Work History   Currently employed No   Recent job loss or change   (N/A)    service Other  (Discharged with Full Disablity)   /[employer] involvement [employer], 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

## 2022-10-12 NOTE — PROGRESS NOTES
Pt awoke around 0230 and called out. This writer went to check on the pt and he was awake and appeared confused about where he was. He also appeared to be seeing something or someone near his window and he thought that someone had come into his room and placed another catheter in him. Reassured that he does not have a catheter and offered to take him to the bathroom. He stated he had to urinate, so offered the urinal instead but he was unable to urinate. Pt stated he needed to talk to the doctor and reassured that writer would pass this on. It is still very difficult to understand pt due to his garbled speech. Able to redirect pt to try to sleep again.

## 2022-10-12 NOTE — PROGRESS NOTES
Behavioral Services  Medicare Certification Upon Admission    I certify that this patient's inpatient psychiatric hospital admission is medically necessary for:    [x] (1) Treatment which could reasonably be expected to improve this patient's condition,       [x] (2) Or for diagnostic study;     AND     [x](2) The inpatient psychiatric services are provided while the individual is under the care of a physician and are included in the individualized plan of care.     Estimated length of stay/service 5 d    Plan for post-hospital care outpt    Electronically signed by Chioma Patel MD on 10/12/2022 at 2:00 PM

## 2022-10-13 LAB
ANION GAP SERPL CALCULATED.3IONS-SCNC: 9 MMOL/L (ref 3–16)
BUN BLDV-MCNC: 23 MG/DL (ref 7–20)
CALCIUM SERPL-MCNC: 8.8 MG/DL (ref 8.3–10.6)
CHLORIDE BLD-SCNC: 100 MMOL/L (ref 99–110)
CO2: 29 MMOL/L (ref 21–32)
CREAT SERPL-MCNC: 0.9 MG/DL (ref 0.8–1.3)
GFR AFRICAN AMERICAN: >60
GFR NON-AFRICAN AMERICAN: >60
GLUCOSE BLD-MCNC: 78 MG/DL (ref 70–99)
POTASSIUM REFLEX MAGNESIUM: 4.3 MMOL/L (ref 3.5–5.1)
SODIUM BLD-SCNC: 138 MMOL/L (ref 136–145)

## 2022-10-13 PROCEDURE — 36415 COLL VENOUS BLD VENIPUNCTURE: CPT

## 2022-10-13 PROCEDURE — 99233 SBSQ HOSP IP/OBS HIGH 50: CPT | Performed by: PSYCHIATRY & NEUROLOGY

## 2022-10-13 PROCEDURE — 80048 BASIC METABOLIC PNL TOTAL CA: CPT

## 2022-10-13 PROCEDURE — 6370000000 HC RX 637 (ALT 250 FOR IP): Performed by: PSYCHIATRY & NEUROLOGY

## 2022-10-13 PROCEDURE — 1240000000 HC EMOTIONAL WELLNESS R&B

## 2022-10-13 PROCEDURE — 6370000000 HC RX 637 (ALT 250 FOR IP): Performed by: NURSE PRACTITIONER

## 2022-10-13 PROCEDURE — 97535 SELF CARE MNGMENT TRAINING: CPT

## 2022-10-13 RX ORDER — POLYETHYLENE GLYCOL 3350 17 G/17G
17 POWDER, FOR SOLUTION ORAL DAILY
Status: DISCONTINUED | OUTPATIENT
Start: 2022-10-13 | End: 2022-10-15 | Stop reason: HOSPADM

## 2022-10-13 RX ADMIN — LORAZEPAM 1 MG: 1 TABLET ORAL at 14:03

## 2022-10-13 RX ADMIN — DOCUSATE SODIUM 100 MG: 100 CAPSULE, LIQUID FILLED ORAL at 09:38

## 2022-10-13 RX ADMIN — DOCUSATE SODIUM 100 MG: 100 CAPSULE, LIQUID FILLED ORAL at 20:31

## 2022-10-13 RX ADMIN — PANTOPRAZOLE SODIUM 20 MG: 20 TABLET, DELAYED RELEASE ORAL at 06:47

## 2022-10-13 RX ADMIN — LEVOTHYROXINE SODIUM 125 MCG: 125 TABLET ORAL at 06:47

## 2022-10-13 RX ADMIN — MAGNESIUM HYDROXIDE 30 ML: 400 SUSPENSION ORAL at 14:03

## 2022-10-13 RX ADMIN — METOPROLOL SUCCINATE 50 MG: 50 TABLET, EXTENDED RELEASE ORAL at 20:30

## 2022-10-13 RX ADMIN — ATORVASTATIN CALCIUM 20 MG: 10 TABLET, FILM COATED ORAL at 20:29

## 2022-10-13 RX ADMIN — LORAZEPAM 1 MG: 1 TABLET ORAL at 23:34

## 2022-10-13 RX ADMIN — TAMSULOSIN HYDROCHLORIDE 0.4 MG: 0.4 CAPSULE ORAL at 09:38

## 2022-10-13 RX ADMIN — OLANZAPINE 10 MG: 10 TABLET, FILM COATED ORAL at 20:30

## 2022-10-13 RX ADMIN — QUETIAPINE FUMARATE 25 MG: 25 TABLET ORAL at 21:10

## 2022-10-13 RX ADMIN — PANTOPRAZOLE SODIUM 20 MG: 20 TABLET, DELAYED RELEASE ORAL at 16:40

## 2022-10-13 RX ADMIN — MULTIPLE VITAMINS W/ MINERALS TAB 1 TABLET: TAB at 09:38

## 2022-10-13 NOTE — PROGRESS NOTES
Group Therapy Note    Date: 10/13/2022  Start Time: 1000  End Time:  1100  Number of Participants: 4    Type of Group: Music Group    Notes:  Pt present for Music Group. Pt actively participated by making song selections and singing along to music. Participation Level:  Active Listener and Interactive    Participation Quality: Appropriate and Attentive      Speech:  normal      Affective Functioning: Congruent      Endings: None Reported    Modes of Intervention: Support, Socialization, Activity, and Media      Discipline Responsible: Chaplain Kenny Faulkner       10/13/22 1436   Encounter Summary   Encounter Overview/Reason  Behavioral Health   Service Provided For: Patient   Last Encounter    (10/13 Music Group)   Complexity of Encounter Moderate   Begin Time 1000   End Time  1100   Total Time Calculated 60 min   Behavioral Health    Type  Spirituality Group

## 2022-10-13 NOTE — PLAN OF CARE
Spoke with POA who stated that patient has a long history of being fixated on bowel and bladder.  She stated this is a behavior and often he has regular BMs

## 2022-10-13 NOTE — H&P
June, and he deteriorated. He does live in a condo with his  brother who has MRDD, and he apparently helps take care of him. When I met with the patient today, he was difficult to understand as his  speech was difficult to understand. His sister was able to interpret  some of his thoughts. Apparently, he has been talking that when he is  in his massage chair and the massage is turned on that this will  influence the newscaster on television. He also states that he can make  players in a football game win the game. PRIOR PSYCHIATRIC TREATMENT:  [facility] Outpatient, he  goes to the [facility] in [place], and has a nurse practitioner  working with him there. CURRENT MEDICATIONS:  Colace 100 mg b.i.d., Lasix 40 mg daily, Synthroid  125 mcg daily, Toprol 50 mg nightly, Zyprexa 10 mg nightly, Protonix 20  mg twice a day, potassium chloride 20 mEq twice a day, Zocor 40 mg  daily, Flomax 0.4 mg daily, therapeutic multivitamin daily. PHYSICAL EXAMINATION:  Per CHRISTIAN Ward, on 10/12/2022. VITAL SIGNS:  Temperature 98.1, pulse 55, respirations 18, blood  pressure 116/68, 180 pounds. LABORATORIES:  Urinalysis with reflex to culture pending. TSH shows  1.82. Metabolic panel, sodium 393, potassium 5.2, BUN 30, creatinine  1.5. Hemoglobin A1c is 5.9. IMAGING:  X-ray of his hands, pending. EKG on 10/11/2022, QTc 491, left  bundle branch block, ventricular rate 53. DRUGS AND ALCOHOL USE:  Denied. MEDICAL HISTORY:  Significant for acute kidney injury, arthritis, atrial  fib, coronary artery disease, CHF, CKD, hypertension, history of  seizures, UTI. ALLERGIES TO MEDICATION:  PENICILLIN. REVIEW OF SYSTEMS:  Pertinent positives on HPI, otherwise, negative. FAMILY PSYCHIATRIC HISTORY:  Brother with MRDD. SOCIAL HISTORY:  Lives in [place] in a condo with his brother. He  has support within the community from sister and other family members.     LEGAL ISSUES: None.    TRAUMA HISTORY:  None. MENTAL STATUS EXAMINATION:  The patient is a 79-year-old male. He was  very pleasant and cooperative. He made good eye contact. His speech  was garbled and show some articulation error. He denied being suicidal  or homicidal.  He denied any auditory or visual hallucinations, although  he did make comments about being able to control the newscasters and  players in football games. He denied any threats to harm self or  others. Insight and judgment are impaired. Intellectual functioning,  average. There was a MoCA performed on 10/12/2022, which was a 14 out  of 30, and his points came from naming, abstraction primarily. His mood  was okay. Affect was relatively bright. He did not show any  abnormalities of movement at this time. DIAGNOSES:  Axis I:  Schizoaffective disorder, bipolar type. Axis II:  Deferred. Axis III:  See medical history. Axis IV:  Severe. Axis V:  45. PLAN:  1. We will continue with current medication. 2.  Add Seroquel 25 mg at night for sleep purposes. 3.  Goal for discharge will be for stabilization of mood and behavior. 4.  Return home in [place]. 5.  Continue with the [address] [place] [address] in [place]. 6.  Estimated length of stay, 3-4 days. I spent approximately 70 minutes on this eval with more than 50% of the  time discussing patient care and treatment options.         Naye Menjivar MD    D: 10/12/2022 15:55:47       T: 10/12/2022 16:00:30     [email or web address]  Job#: [id number]     Doc#: [id number]    CC:

## 2022-10-13 NOTE — FLOWSHEET NOTE
Senior Purposeful Rounding    Position:Repositions Self    Physical Environment:Room free from clutter, Clear path to bathroom , Adequate lighting, Bed alarm functioning, and No safety hazards noted    Pain Rating/ Nonverbal Pain Behaviors: sleeping, no signs of pain    Pain interventions Attempted: n/a    Patient Toileted:No- Void

## 2022-10-13 NOTE — FLOWSHEET NOTE
Senior Purposeful Rounding    Position:Repositions Self    Physical Environment:Room free from clutter, Clear path to bathroom , Adequate lighting, Bed alarm functioning, and No safety hazards noted    Pain Rating/ Nonverbal Pain Behaviors:none voiced    Pain interventions Attempted: N/a    Patient Toileted:Incontinent of bladder, attends changed, bed linens and clothes changed.

## 2022-10-13 NOTE — PLAN OF CARE
Pt. Is alert to self and hospital, confused, focused on bowel and bladder, received medications, good appetite, Worked with PT and OT, x1 assist with transfers, no aggressive or combative behaviors noted. Urinated x2 this morning. Attending groups, social with staff and peers.

## 2022-10-13 NOTE — PROGRESS NOTES
Department of Psychiatry  AttendingProgress Note  Chief Complaint: schizoaffective do  Chayus Christianson has been cooperative and on unit. He interacts well although difficult to understand speech. He appears focused on his bowels and bladder and repeatedly stated that he couldn't \" poop or pee. \"   Apparently had a BM yesterday and has voided without cath. Affectively appears stable  Patient's chart was reviewed and collaborated with  about the treatment plan. SUBJECTIVE:    Patient is feeling better. Suicidal ideation:  denies suicidal ideation. Patient does not have medication side effects. ROS: Patient has new complaints: no  Sleeping adequately:  Yes   Appetite adequate: Yes  Attending groups: Yes  Visitors:Yes    OBJECTIVE    Physical  VITALS:  /78   Pulse 76   Temp 97.8 °F (36.6 °C) (Temporal)   Resp 16   Wt 180 lb 3.2 oz (81.7 kg)   SpO2 97%   BMI 25.13 kg/m²     Mental Status Examination:  Patients appearance was ill-appearing. Thoughts are [place]. Homicidal ideations none. No abnormal movements, tics or mannerisms. Memory intact Aims 0. Concentration Fair. Alert and oriented X 4. Insight and Judgement impaired insight. Patient was cooperative. Patient gait abnormal. Mood within normal limits, affect normal affect Hallucinations Absent, suicidal ideations no specific plan to harm self Speech normal volume  Data  Labs:   Admission on 10/11/2022   Component Date Value Ref Range Status    SARS-CoV-2 RNA, RT PCR 10/11/2022 NOT DETECTED  NOT DETECTED Final    Comment: Not Detected results do not preclude SARS-CoV-2 infection and  should not be used as the sole basis for patient management  decisions. Results must be combined with clinical observations,  patient history, and epidemiological information. Testing was performed using ELIZABETH AGUSTIN SARS-CoV-2 and Influenza A/B  nucleic acid assay.  This test is a multiplex Real-Time Reverse  Transcriptase Polymerase Chain Reaction (RT-PCR)-based in vitro  diagnostic test intended for the qualitative detection of nucleic  acids from SARS-CoV-2, influenza A, and influenza B in nasopharyngeal  and nasal swab specimens for use under the FDAs Emergency Use  Authorization (EUA) only. Patient Fact Sheet:  FindDrives.pl  Provider Fact Sheet: FindDrives.pl  EUA: FindDrives.pl  IFU: FindDrives.pl    Methodology:  RT-PCR      INFLUENZA A 10/11/2022 NOT DETECTED  NOT DETECTED Final    INFLUENZA B 10/11/2022 NOT DETECTED  NOT DETECTED Final    TSH 10/11/2022 1.82  0.27 - 4.20 uIU/mL Final    Ventricular Rate 10/11/2022 53  BPM Final    Atrial Rate 10/11/2022 53  BPM Final    QRS Duration 10/11/2022 148  ms Final    Q-T Interval 10/11/2022 524  ms Final    QTc Calculation (Bazett) 10/11/2022 491  ms Final    R Axis 10/11/2022 3  degrees Final    T Axis 10/11/2022 83  degrees Final    Diagnosis 10/11/2022 Normal sinus rhythm with 1st degree A-V blockLow voltageLeft bundle branch blockAbnormal ECGNo previous ECGs availableConfirmed by ROSALIO Moore MD ([id number]) on 10/12/2022 7:49:37 AM   Final    Cholesterol, Total 10/11/2022 109  0 - 199 mg/dL Final    Triglycerides 10/11/2022 56  0 - 150 mg/dL Final    HDL 10/11/2022 61 (A)  40 - 60 mg/dL Final    Comment: An HDL cholesterol less than 40 mg/dL is low and  constitutes a coronary heart disease risk factor. An HDL cholesterol greater than 60 mg/dL is a  negative risk factor for coronary heart disease.       LDL Calculated 10/11/2022 37  <100 mg/dL Final    VLDL Cholesterol Calculated 10/11/2022 11  Not Established mg/dL Final    Hemoglobin A1C 10/11/2022 5.9  See comment % Final    Comment: Comment:  Diagnosis of Diabetes: > or = 6.5%  Increased risk of diabetes (Prediabetes): 5.7-6.4%  Glycemic Control: Nonpregnant Adults: <7.0%                    Pregnant: <6.0%        eAG 10/11/2022 122.6 mg/dL Final    Sodium 10/12/2022 135 (A)  136 - 145 mmol/L Final    Potassium reflex Magnesium 10/12/2022 5.2 (A)  3.5 - 5.1 mmol/L Final    Chloride 10/12/2022 96 (A)  99 - 110 mmol/L Final    CO2 10/12/2022 28  21 - 32 mmol/L Final    Anion Gap 10/12/2022 11  3 - 16 Final    Glucose 10/12/2022 71  70 - 99 mg/dL Final    BUN 10/12/2022 30 (A)  7 - 20 mg/dL Final    Creatinine 10/12/2022 1.5 (A)  0.8 - 1.3 mg/dL Final    GFR Non- 10/12/2022 46 (A)  >60 Final    Comment: >60 mL/min/1.73m2 EGFR, calc. for ages 25 and older using the  MDRD formula (not corrected for weight), is valid for stable  renal function. GFR  10/12/2022 55 (A)  >60 Final    Comment: Chronic Kidney Disease: less than 60 ml/min/1.73 sq.m. Kidney Failure: less than 15 ml/min/1.73 sq.m. Results valid for patients 18 years and older. Calcium 10/12/2022 8.7  8.3 - 10.6 mg/dL Final    Sodium 10/13/2022 138  136 - 145 mmol/L Final    Potassium reflex Magnesium 10/13/2022 4.3  3.5 - 5.1 mmol/L Final    Chloride 10/13/2022 100  99 - 110 mmol/L Final    CO2 10/13/2022 29  21 - 32 mmol/L Final    Anion Gap 10/13/2022 9  3 - 16 Final    Glucose 10/13/2022 78  70 - 99 mg/dL Final    BUN 10/13/2022 23 (A)  7 - 20 mg/dL Final    Creatinine 10/13/2022 0.9  0.8 - 1.3 mg/dL Final    GFR Non- 10/13/2022 >60  >60 Final    Comment: >60 mL/min/1.73m2 EGFR, calc. for ages 25 and older using the  MDRD formula (not corrected for weight), is valid for stable  renal function. GFR  10/13/2022 >60  >60 Final    Comment: Chronic Kidney Disease: less than 60 ml/min/1.73 sq.m. Kidney Failure: less than 15 ml/min/1.73 sq.m. Results valid for patients 18 years and older.       Calcium 10/13/2022 8.8  8.3 - 10.6 mg/dL Final            Medications  Current Facility-Administered Medications: [Held by provider] furosemide (LASIX) tablet 40 mg, 40 mg, Oral, Daily  OLANZapine (ZYPREXA) tablet 10 mg, 10 mg, Oral, Nightly  pantoprazole (PROTONIX) tablet 20 mg, 20 mg, Oral, BID AC  therapeutic multivitamin-minerals 1 tablet, 1 tablet, Oral, Daily  atorvastatin (LIPITOR) tablet 20 mg, 20 mg, Oral, Nightly  QUEtiapine (SEROQUEL) tablet 25 mg, 25 mg, Oral, Nightly  metoprolol succinate (TOPROL XL) extended release tablet 50 mg, 50 mg, Oral, Nightly  levothyroxine (SYNTHROID) tablet 125 mcg, 125 mcg, Oral, Daily  [Held by provider] potassium chloride (KLOR-CON M) extended release tablet 20 mEq, 20 mEq, Oral, BID  acetaminophen (TYLENOL) tablet 650 mg, 650 mg, Oral, Q4H PRN  magnesium hydroxide (MILK OF MAGNESIA) 400 MG/5ML suspension 30 mL, 30 mL, Oral, Daily PRN  nicotine polacrilex (COMMIT) lozenge 2 mg, 2 mg, Oral, Q1H PRN  aluminum & magnesium hydroxide-simethicone (MAALOX) 200-200-20 MG/5ML suspension 30 mL, 30 mL, Oral, Q6H PRN  hydrOXYzine HCl (ATARAX) tablet 50 mg, 50 mg, Oral, TID PRN  OLANZapine (ZYPREXA) tablet 10 mg, 10 mg, Oral, Q4H PRN **OR** OLANZapine (ZYPREXA) 10 mg in sterile water 2 mL injection, 10 mg, IntraMUSCular, Q4H PRN  benztropine mesylate (COGENTIN) injection 2 mg, 2 mg, IntraMUSCular, BID PRN  docusate sodium (COLACE) capsule 100 mg, 100 mg, Oral, BID  tamsulosin (FLOMAX) capsule 0.4 mg, 0.4 mg, Oral, Daily  LORazepam (ATIVAN) tablet 1 mg, 1 mg, Oral, Q6H PRN  melatonin tablet 3 mg, 3 mg, Oral, PRN  acetaminophen (TYLENOL) tablet 650 mg, 650 mg, Oral, Q6H PRN    ASSESSMENT AND PLAN    Principal Problem:    Schizoaffective disorder (HCC)  Active Problems:    Hypercholesteremia    New onset seizure (HCC)    Other specified hypothyroidism    Paroxysmal atrial fibrillation (HCC)    Benign prostatic hyperplasia, presence of lower urinary tract symptoms unspecified    Cardiomyopathy ([facility] [address]    CKD (chronic kidney disease)  Resolved Problems:    * No resolved hospital problems. *       1. Patient s symptoms   are improving  2. Probable discharge is next week  3. Discharge planning is complete  4. Suicidal ideation is  none  5. Total time with patient was 40 minutes and more than 50 % of that time was spent counseling the patient on their symptoms, treatment and expected goals.

## 2022-10-13 NOTE — PROGRESS NOTES
Patient incontinent of large amount of urine X1 this shift. Bed linens, clothes and attends changed.

## 2022-10-13 NOTE — PROGRESS NOTES
Inpatient Occupational Therapy Treatment Note    Unit:  [facility]  Date:  10/13/2022  Patient Name:    Arturo Vazquez  Admitting diagnosis:  Schizoaffective disorder [facility]) [F25.9]  Admit Date:  10/11/2022  Precautions/Restrictions/WB Status/ Lines/ Wounds/ Oxygen:  Standard BHI Precautions  Fall Risk  [place] (hard of hearing)  Garbled speech-difficult to understand    History of Present Illness:  Pt transferred from [address] [place] [address] on 10/11. Per shift report, staff at other facility went to place glynn in pt, and prostate swelling made this difficult, which made pt \"aggressive\". Decision was made to medicate him and straight cath him q 8. Pt presented voluntarily for [facility] for a rule out of schizoaffective dx, bipolar type, and schizophrenia dx. He was observed talking to someone who was not present, had VH with running water when turning off the TV, and states he sees a woman and other people running around outside in the woods. Has past hx of impulsivity, failed outpt tx, past dx of bipolar dx and schizophrenia. Denies previous inpt hospitalizations or legal hx. Pt's sister is Geovanna Cleaning RN on [facility]. Treatment Number:  2    Treatment Time: 160-1010  Timed Code Treatment Minutes:    40 minutes   Total Treatment Time:   40   minutes    Staff Recommendations:    Assist of 1 with transfers bed to w/c, with SBA of another using RW  May use STEDY if there is an urgent need for the bathroom. Discharge Recommendations: SNF    DME needs for discharge:  defer to facility    AM-PAC Score: AM-PAC Inpatient Daily Activity Raw Score: 14   Home Health S4 Level: NA     MOCA:  performed 10/12/22  [facility]undo Cognitive Assessment [facility])  (See pt folder behind nurses station for copy of assessment while pt is admitted.)   Total Score: Sum of all subscores listed on the right-hand side. Add one point for an individual who has 12 years or fewer of formal education, for a possible maximum of 30 points.  A final total score of 26 and above is considered normal.      Education Level HS      Visuospatial/Executive  1/5   Naming  3/3   Attention  2/6   Language 1/3   Abstraction  2/2   Delayed Recall     MIS = 6/15    0/5   Orientation  5/6   (additional point for <12 grade educations)      Total Score     14/30        Subjective:  Pt was found in STEDY with RN attempting to get to the bathroom. Pt agreed to work with OT for toileting. ADLs:  Sleep Hygiene:  NT  Income:NT  Financial Management:  NT  Leisure Interests:  NT  Medication Management:NT  Health Management:   NT  Social Network:  NT  Stressors:  NT  Coping Skills: NT  Self Care: Toileting:  Pt reported feeling like he needs to have a BM but was unable to do so. He did urinate. He needed Max A in standing to pull pants up/down  Grooming: NT  Dressing: NT  Bathing: NT  Self-Feeding:  Setup required    Pain   No  Rating:NA  Location: NA  Pain Medicine Status: No request made      Cognition    A&O to x4  Able to follow:  1 step commands    Balance:     Sitting: Good  Standing: Fair + with bilateral support on walker    Bed mobility:  Not tested    Transfer Training:   Sit to stand:   Min A  Stand to sit:  Min A  Bed to Chair:  Min A with STEDY  Standard toilet: Mod A with RW    Activity Tolerance   Pt completed therapy session with No adverse symptoms noted w/activity    Therapeutic Exercise: NA    Patient Education:   Role of OT, ADL retraining, and Recommendations for DC    Positioning Needs: In common room with needs met    Family Present:  No    Assessment: Pt continues to need Max A with toileting, but was able to ambulate with RW today functional distance of 6 feet with w/c follow. Tolerated it fairly well. Staff is using STEDY to get pt to the bathroom because it is usually urgent. GOALS  To be met in 3 Visits:  1). Assess IADLs and write goals as appropriate  2). Pt will participate in [facility] assessment. 3).  Pt will perform 2 grooming tasks with setup. To be met in 5 Visits:  1). Supine to Sit IND  2). Bed to Chair/BSC with CGA  3). Upper Body Dressing IND  4). Lower Body Dressing  Mod A  5). Pt to jeaneth UE exs [id number]  6). Pt to complete 3 ADL activities prior to lunchtime. (1. Brush hair, 2. Wash face, 3.  Brush teeth)      Plan: cont with [facility] MS, OTR/L  #[id number]        If patient discharges from this facility prior to next visit, this note will serve as the Discharge Summary

## 2022-10-13 NOTE — FLOWSHEET NOTE
Senior Purposeful Rounding    Position:Repositions Self    Physical Environment:Room free from clutter, Clear path to bathroom , Adequate lighting, Bed alarm functioning, and No safety hazards noted    Pain Rating/ Nonverbal Pain Behaviors: no current signs of pain, received tylenol at 2128 for low back pain    Pain interventions Attempted: tylenol at 2128 - effective    Patient Toileted:Continent and No- Void

## 2022-10-13 NOTE — PLAN OF CARE
Pt. Is experiencing increased anxiety. Focused on bowel movements. Last bowel movement was yesterday large BM, Pt has been in the restroom several times straining very hard. This apperar to be a behavior. Ativan 1 mg po administered, Milk of Mag. 30 mg po administered for constipation. Will monitor.

## 2022-10-13 NOTE — GROUP NOTE
Group Therapy Note    Date: 10/13/2022    Group Start Time: 1330  Group End Time: 0408  Group Topic: Cognitive Skills    [id number] [facility]        Group Therapy Note    Attendees: 4     Writer checked in with patients and discussed their highs and lows of the day. Notes:  Writer checked in with patient. Patient remained fixated on not being able to urinate or defecate. Patient was provided fluids and a snack. Patient was unable to be redirected.      Status After Intervention:  Unchanged    Participation Level: Minimal    Participation Quality: Inappropriate      Speech:  pressured      Thought Process/Content: Linear      Affective Functioning: Exaggerated      Mood: elevated      Level of consciousness:  Preoccupied       Response to Learning: Resistant      Endings: None Reported    Modes of Intervention: Exploration      Discipline Responsible: Behavorial Health Tech      Signature:  ANIBAL Mansfield

## 2022-10-13 NOTE — PLAN OF CARE
Nursing shift report 1900 to present- Patient sat in chair watching TV all evening with no unsafe or aggressive behaviors. He is oriented to person only and thinks he is in [place] and it it January. He has garbled speech so he is difficult to understand at times. He denied SI and HI but stated \"sometimes \" when asked about AH/VH. He did not answer if having hallucinations now. He did not voice anything delusional to this writer. He can bear weight but needs 2 person assist for transfers and wheelchair used. He was medication compliant , metoprolol held for P- 58 and potassium held per MD hold orders. Patient given tylenol 650 mg oral at 2128 for low back pain (unable to give pain number score). This appeared to be effective and patient has been sleeping with no further complaints or signs of pain. Patient continent and urinated well before going to sleep. Safety checks and fall precautions continue.      Problem: Chronic Conditions and Co-morbidities  Goal: Patient's chronic conditions and co-morbidity symptoms are monitored and maintained or improved  Outcome: Progressing     Problem: Safety - Adult  Goal: Free from fall injury  Outcome: Progressing     Problem: Pain  Goal: Verbalizes/displays adequate comfort level or baseline comfort level  Outcome: Progressing

## 2022-10-14 PROCEDURE — 6370000000 HC RX 637 (ALT 250 FOR IP): Performed by: NURSE PRACTITIONER

## 2022-10-14 PROCEDURE — 99233 SBSQ HOSP IP/OBS HIGH 50: CPT | Performed by: PSYCHIATRY & NEUROLOGY

## 2022-10-14 PROCEDURE — 6370000000 HC RX 637 (ALT 250 FOR IP): Performed by: PSYCHIATRY & NEUROLOGY

## 2022-10-14 PROCEDURE — 97162 PT EVAL MOD COMPLEX 30 MIN: CPT

## 2022-10-14 PROCEDURE — 97530 THERAPEUTIC ACTIVITIES: CPT

## 2022-10-14 PROCEDURE — 97535 SELF CARE MNGMENT TRAINING: CPT

## 2022-10-14 PROCEDURE — 1240000000 HC EMOTIONAL WELLNESS R&B

## 2022-10-14 RX ORDER — POTASSIUM CHLORIDE 20 MEQ/1
20 TABLET, EXTENDED RELEASE ORAL
Status: DISCONTINUED | OUTPATIENT
Start: 2022-10-15 | End: 2022-10-15 | Stop reason: HOSPADM

## 2022-10-14 RX ADMIN — TAMSULOSIN HYDROCHLORIDE 0.4 MG: 0.4 CAPSULE ORAL at 10:33

## 2022-10-14 RX ADMIN — DOCUSATE SODIUM 100 MG: 100 CAPSULE, LIQUID FILLED ORAL at 10:33

## 2022-10-14 RX ADMIN — METOPROLOL SUCCINATE 50 MG: 50 TABLET, EXTENDED RELEASE ORAL at 23:09

## 2022-10-14 RX ADMIN — LEVOTHYROXINE SODIUM 125 MCG: 125 TABLET ORAL at 06:45

## 2022-10-14 RX ADMIN — OLANZAPINE 10 MG: 10 TABLET, FILM COATED ORAL at 23:10

## 2022-10-14 RX ADMIN — MAGNESIUM HYDROXIDE 30 ML: 400 SUSPENSION ORAL at 07:12

## 2022-10-14 RX ADMIN — PANTOPRAZOLE SODIUM 20 MG: 20 TABLET, DELAYED RELEASE ORAL at 16:00

## 2022-10-14 RX ADMIN — QUETIAPINE FUMARATE 25 MG: 25 TABLET ORAL at 23:09

## 2022-10-14 RX ADMIN — LORAZEPAM 1 MG: 1 TABLET ORAL at 21:00

## 2022-10-14 RX ADMIN — PANTOPRAZOLE SODIUM 20 MG: 20 TABLET, DELAYED RELEASE ORAL at 06:45

## 2022-10-14 RX ADMIN — POLYETHYLENE GLYCOL (3350) 17 G: 17 POWDER, FOR SOLUTION ORAL at 10:35

## 2022-10-14 RX ADMIN — MULTIPLE VITAMINS W/ MINERALS TAB 1 TABLET: TAB at 10:33

## 2022-10-14 RX ADMIN — DOCUSATE SODIUM 100 MG: 100 CAPSULE, LIQUID FILLED ORAL at 23:09

## 2022-10-14 RX ADMIN — ATORVASTATIN CALCIUM 20 MG: 10 TABLET, FILM COATED ORAL at 23:09

## 2022-10-14 NOTE — PROGRESS NOTES
Inpatient Geriatric  Physical Therapy Evaluation and Treatment    Unit:  [facility]  Date:  10/14/2022  Patient Name:    Adia Moreno  Admitting diagnosis:  Schizoaffective disorder [facility]) [F25.9]  Admit Date:  10/11/2022  Precautions/Restrictions/Medical Indications    Standard BHI Precautions  Fall Risk  bed/chair alarm  [place] (hard of hearing)  History of current Episode: per Epply:\" The patient is a 68-year-old male who was  in a wheelchair. He currently lives in [place], [place], with his  brother in a condominium. His sister, Tha Guevara, was present today when I  met with the patient and is very involved in his overall care and  history. Apparently, he was thought to have suicidal ideation after he  had taken too many of his Remeron to try to sleep. Sister Tha Guevara stated  that he has never had a suicide attempt in his life. He was then  transferred to [facility] [address] in [place], [place], for psychiatric admission. Report is that he was having  hallucinations and impulsive behaviors. He has also been using a Peterson  catheter, although he does not have a history of using Peterson catheter  all the time. He recently did have a UTI, which resolved according to  report. He was admitted on a voluntary basis with a history of  schizoaffective disorder, bipolar type. When he was hospitalized there,  he was inpatient for 10 days, and during that time, he was observed to  be talking to someone in the room who was not present. Apparently, he  has been having visual hallucinations with running water when he turns  of the TV. Apparently, he also was seeing a woman and people running  around outside in the woods. His sister stated that he has not had these delusions and hallucinations  from a long period of time while he was on Zyprexa, but they stopped  that in June, and he deteriorated. He does live in a condo with his  brother who has MRDD, and he apparently helps take care of him. When I met with the patient today, he was difficult to understand as his  speech was difficult to understand. His sister was able to interpret  some of his thoughts. Apparently, he has been talking that when he is  in his massage chair and the massage is turned on that this will  influence the newscaster on television. He also states that he can make  players in a football game win the game. \"     Per Alirio Christine: \"The patient is a 78 y.o. male with PMH  of atrial fibrillation CAD, cardiomyopathy, CHF, CKD, HTN, HLD and seizures who presented to [address] [place] [address] for hallucinations, delusions. Patient was seen and evaluated in the ED by the ED medical provider, patient was medically cleared for admission to senior behavioral health center at [address] [place] [address]. This note serves as an admission medical H&P. Pt HPI is limited due to patient being a poor historian. \"  Treatment Time:  16:55-18:00  Treatment Number:  1         Discharge Recommendations from PHYSICAL perspective:                                          SNF    DME needs for discharge:     defer to facility     Therapy recommendations for staff:   Assist of 2 (moderate assist) with use of gait belt for all transfers bed to wheelchair, wheelchair to toilet OR Stedy and Ax2 to wheelchair or toilet    Use gait belt    Therapy recommendation for EMS Transport: can transport by wheelchair     [facility] S4 Level: NA        AM-PAC Mobility Score     AM-PAC Inpatient Mobility Raw Score : 12    [facility] scored a 12/24 on the AM-PAC short mobility form. Current research shows that an AM-PAC score of 17 or less is typically not associated with a discharge to the patient's home setting. If patient discharges prior to next session this note will serve as a discharge summary. Please see below for the latest assessment towards goals. Preadmission Environment    Pt.  Lives with his brother Dario Wright, who is mentally disabled  Home environment:    one story home condominium  Steps to enter first floor: No steps  Steps to second floor: N/A  Bathroom: walk in shower, grab bars, and shower toilet, elevated commode  Equipment owned: RW, wc (manual), shower chair/bench, BSC, and hospital bed     Preadmission Status:  Pt. Able to drive: No-family drives   Pt Fully independent with ADLs: Yes-brother sets up shower, help him dry off, and don socks and shoes, and donning his pants  Pt. Required assistance from family for: Bathing, Cleaning, Dressing, and Laundry ; pt reports he cooks  Pt. independent for transfers and gait and walked with [employer] [place], during hospitalization in July he needed  History of falls Yes-reports \"lot of falls\", one in the last month    Subjective:   Pt agreeable to evaluation and treatment as needed. Patient sitting in wheelchair watching TV. States he needs to use toilet    Pain   Yes  Rating:mild  Location: B hands  Pain Medicine Status: No request made      Cognition    Patient  able to follow 1 step commands with occasional repetition  Speech in garbled and difficult to understand at times  Upper Extremity ROM/Strength  Deferred to OT evaluation: please see OT note    Lower Extremity ROM / Strength (use of 5/5 scale if strength formally assessed)    AROM: WFL    Strength is grossly 3/5 to complete transfers with assist  Joint crepetice noted in BLE's during transfer on/off toilet          Trunk Control   Fair    Vision:   NT  Comments:     Hearing:   Impaired  Comments:     Balance  Static Sitting:  Good   Dynamic Sitting: Fair +  Static Standing:  Fair - with bilateral support in bathroom  Dynamic Standing: Poor        Bed mobility    Rolling to left:    Not Tested  Rolling to right:   Not Tested  Scooting in supine:   Not Tested  Scooting in sitting:   CGA  Supine to sit with HOB flat:  Not Tested  Sit to supine with HOB flat:  Not Tested    Transfers    Sit to stand:   Mod A of 2, 2 attempt from wheelchair to bars in bathroom  Stand to sit:  Mod A for controlled descent to toilet  Wheelchair<>to toilet: Mod A of 2    Toileting  Sit>stand from toilet (standard height): Mod A of 2  Stand>sit to toilet (standard height): Max A of 2  Kerry-care:      Not Tested  Hand hygiene:     Not Tested  LE pants management:   Total A   Cues:    Gait gait deferred due to fatigue; pt ambulated 0 ft. Stair Training deferred, pt unsafe/ not appropriate to complete stairs at this time        WC mobility     Type of chair:   Manual   Cushion:   No  Distance:   10 forward/backward  Assistance level:  SBA  Cued on: safety-use of brakes  Extremities utilized:  R UE and L UE  Surface:   even-level     Activity Tolerance   No adverse symptoms noted w/activity    Positioning Needs   Patient sitting in wheelhchair in common area. Per RN, not chair alarm is needed     Therapeutic Exercises Initiated    Exercises in BOLD performed in unit today. Items not bolded are carried forward from prior visits for continuity of the record. Exercise/Equipment Resistance/Repetitions Other comments                                        Patient/Family Education   Educated on importance of continued activity   Educated on safety with transfers  Educated on role of PT, POC as well as importance of continued activity      Patients response to evaluation/treatment:   Pt tolerated treatment well  Pt limited by fatigue    Impairments/ Deficits  Decreased strength, Decreased joint mobility, Abnormality of gait, Decreased balance, and Decreased functional status below baseline    Assessment of Deficits  Pt is 78year old male presenting within the senior behavioral health institute at [facility] with medical diagnosis of Schizoaffective disorder ([facility] [address] [F25.9]. Presents today with weakness, poor safety awareness, gait abnormality , decreased transfer ability , and poor balance. Would benefit from continued IP PT to address below impairments and restore function.      Recommending SNF upon discharge as patient functioning well below baseline, demonstrates good rehab potential and unable to return home due to limited or no family support, burden of care beyond caregiver ability, home environment not conducive to patient recovery, and limited safety awareness. Goals :   Patient Goals for Therapy: None stated     To be met in 3 visits:  1). Demonstrate improved safety awareness with functional mobility requiring less overall cueing. To be met in 6 visits:  1). Supine to/from sit  Independent to promote return to functional ADLs  2). Sit to/from stand CGA to promote return to functional ADLs  3). Gait: Ambulate  75 ft.  with CGA and use of rolling walker (RW) demonstrating improved gait pattern  4). Tolerate B LE exercises 3 sets of 10-15 reps to improve strength   5). Tolerated standing balance exercises with improved balance to Good -  grade    Rehabilitation Potential    Good  Strengths for achieving goals include:   PLOF and Pt cooperative  Barriers to achieving goals include:    Cognitive deficits and Severity of condition      Plan    To be seen 3-5x/week while in [facility] setting for   Therapeutic Exercise, Gait training, and Therapeutic Activity     Timed Code Treatment Minutes:  55 minutes  Total Treatment Time:   65 minutes    Nathalie Nair, PT #[id number]       If patient discharges from this facility prior to next visit, this note will serve as the Discharge Summary.

## 2022-10-14 NOTE — FLOWSHEET NOTE
Senior Purposeful Rounding     Position:Ambulating in diego with wheelchair     Physical Environment:Room free from clutter, Clear path to bathroom , Adequate lighting, and No safety hazards noted     Pain Rating/ Nonverbal Pain Behaviors:denies; 0     Pain interventions Attempted: n/a     Patient Toileted:No- Void

## 2022-10-14 NOTE — PROGRESS NOTES
Inpatient Occupational Therapy Treatment Note    Unit:  [facility]  Date:  10/14/2022  Patient Name:    Milo Bermudez  Admitting diagnosis:  Schizoaffective disorder [facility]) [F25.9]  Admit Date:  10/11/2022  Precautions/Restrictions/WB Status/ Lines/ Wounds/ Oxygen:  Standard BHI Precautions  Fall Risk  [place] (hard of hearing)  Garbled speech-difficult to understand    History of Present Illness:  Pt transferred from [address] [place] [address] on 10/11. Per shift report, staff at other facility went to place glynn in pt, and prostate swelling made this difficult, which made pt \"aggressive\". Decision was made to medicate him and straight cath him q 8. Pt presented voluntarily for [facility] for a rule out of schizoaffective dx, bipolar type, and schizophrenia dx. He was observed talking to someone who was not present, had VH with running water when turning off the TV, and states he sees a woman and other people running around outside in the woods. Has past hx of impulsivity, failed outpt tx, past dx of bipolar dx and schizophrenia. Denies previous inpt hospitalizations or legal hx. Pt's sister is Mehreen Ghosh RN on [facility]. Treatment Number:  3    Treatment Time: 0050-8067  Timed Code Treatment Minutes:    25 minutes   Total Treatment Time:   25   minutes    Staff Recommendations:    Assist of 1 with transfers bed to w/c, with SBA of another using RW  May use STEDY if there is an urgent need for the bathroom. Discharge Recommendations: SNF    DME needs for discharge:  defer to facility    AM-PAC Score: AM-PAC Inpatient Daily Activity Raw Score: 14   Home Health S4 Level: NA     MOCA:  performed 10/12/22  Segundo Cognitive Assessment [facility])  (See pt folder behind nurses station for copy of assessment while pt is admitted.)   Total Score: Sum of all subscores listed on the right-hand side. Add one point for an individual who has 12 years or fewer of formal education, for a possible maximum of 30 points.  A final total score of 26 and above is considered normal.      Education Level HS      Visuospatial/Executive  1/5   Naming  3/3   Attention  2/6   Language 1/3   Abstraction  2/2   Delayed Recall     MIS = 6/15    0/5   Orientation  5/6   (additional point for <12 grade educations)      Total Score     14/30        Subjective:  Pt was found in STEDY with RN attempting to get to the bathroom. Pt agreed to work with OT for toileting. ADLs:  Sleep Hygiene:  NT  Income:NT  Financial Management:  NT  Leisure Interests:  NT  Medication Management:NT  Health Management:   NT  Social Network:  NT  Stressors:  NT  Coping Skills: NT  Self Care: Toileting:  Pt reported feeling like he needs to have a BM but was unable to do so. He did urinate. He needed Max A in standing to pull pants up/down  Grooming: NT  Dressing: NT  Bathing: NT  Self-Feeding:  Setup required    Pain   Yes  Rating:mild  Location: R hand  Pain Medicine Status: No request made      Cognition    A&O to x4  Able to follow:  1 step commands    Balance:     Sitting: Good SBA on toilet  Standing: Fair + using grab bar in bathroom. Bed mobility:  Not tested    Transfer Training:   Sit to stand: Mod A - From WC to toilet in bathroom using grab bar. Min A - from toilet to [facility] using grab bars. Stand to sit:  Min A  Bed to Chair:  Not Tested   Standard toilet: Mod A    Dressing:      UE:   Not Tested  LE:    Not Tested    Bathing:    UE:  Not Tested  LE:  Not Tested    Eating:   Not Tested    Toileting:  SBA- pt sat on toilet for ~8 minutes attempting to have a BM. Pt was unsuccessful and required redirection to stop. Pt did urinate. Activity Tolerance   Pt completed therapy session with No adverse symptoms noted w/activity    Therapeutic Exercise: NA    Patient Education:   Role of OT and ADL retraining    Positioning Needs:    In common room with needs met    Family Present:  No    Assessment: Pt declined in therapy this date by requiring Mod A from [facility] to transfer to toilet using grab bars. Pt attempted BM but was unsuccessful. Pt did improve in his ability to transfer from toilet>to . Pt is still functioning below baseline and would continue to benefit from skilled occupational therapy services. GOALS  To be met in 3 Visits: Continue goals 10/14/22  1). Assess IADLs and write goals as appropriate  2). Pt will participate in [address] [place] [address] assessment. 3). Pt will perform 2 grooming tasks with setup. To be met in 5 Visits:  1). Supine to Sit IND  2). Bed to Chair/BSC with CGA  3). Upper Body Dressing IND  4). Lower Body Dressing  Mod A  5). Pt to jeaneth UE exs [id number]  6). Pt to complete 3 ADL activities prior to lunchtime. (1. Brush hair, 2. Wash face, 3. Brush teeth)      Plan: cont with POC    Can Ardon OTR/L #[id number]        If patient discharges from this facility prior to next visit, this note will serve as the Discharge Summary

## 2022-10-14 NOTE — BH NOTE
Contacted the following for SNF/rehab placement:    [address]: referral faxed, beds will be available later next week (fax: [phone])  [address]: ([phone]) Left vm for jul in admission  [address]: [phone] darryl, Referral faxed ([phone])      Nile Ventura, [address] Student Intern    ODILON Douglas

## 2022-10-14 NOTE — PLAN OF CARE
Delores Rubio has been out on the unit. He is alert and oriented to person, hospital, month, and year but forgetful. Speech is garbled and difficult to understand. He has been focused on the brakes of the wheelchair all night. He is able to wheel himself around but frequently asking for assistance with engaging or disengaging the brakes. He is med compliant. Can be loud and intrusive but no combative behaviors. Denies SI/HI/AVH. Continent of urine with assist x 2 to BR. PRN Ativan 1 mg PO @ 0991 for anxiety, restlessness, and sleep.

## 2022-10-14 NOTE — PROGRESS NOTES
Department of Psychiatry  AttendingProgress Note  Chief Complaint: psychosis  Marva Stern has been cooperative although he appears fixated on his WC as she repeatedly applies and then opens the brake but it's not clear as to the reason for his actions. He was less focused on bowel habits today . Will continue with current meds. Does not appear psychotic at this time and is calm and cooperative  Patient's chart was reviewed and collaborated with  about the treatment plan. SUBJECTIVE:    Patient is feeling better. Suicidal ideation:  denies suicidal ideation. Patient does not have medication side effects. ROS: Patient has new complaints: no  Sleeping adequately:  Yes   Appetite adequate: Yes  Attending groups: Yes  Visitors:Yes    OBJECTIVE    Physical  VITALS:  BP (!) 111/93   Pulse 52   Temp 98.6 °F (37 °C) (Oral)   Resp 18   Ht 5' 8\" (1.727 m)   Wt 180 lb 3.2 oz (81.7 kg)   SpO2 98%   BMI 27.40 kg/m²     Mental Status Examination:  Patients appearance was street clothes. Thoughts are Illogical. Homicidal ideations none. No abnormal movements, tics or mannerisms. Memory imPAIRED Aims 0. Concentration Fair. Alert and oriented X 4. Insight and Judgement impaired insight. Patient was cooperative. Patient gait wc. Mood constricted, affect anxiety Hallucinations Absent, suicidal ideations no specific plan to harm self Speech articulation error  Data  Labs:   Admission on 10/11/2022   Component Date Value Ref Range Status    SARS-CoV-2 RNA, RT PCR 10/11/2022 NOT DETECTED  NOT DETECTED Final    Comment: Not Detected results do not preclude SARS-CoV-2 infection and  should not be used as the sole basis for patient management  decisions. Results must be combined with clinical observations,  patient history, and epidemiological information. Testing was performed using ELIZABETH AGUSTIN SARS-CoV-2 and Influenza A/B  nucleic acid assay.  This test is a multiplex Real-Time Reverse  Transcriptase Polymerase Chain Reaction (RT-PCR)-based in vitro  diagnostic test intended for the qualitative detection of nucleic  acids from SARS-CoV-2, influenza A, and influenza B in nasopharyngeal  and nasal swab specimens for use under the FDAs Emergency Use  Authorization (EUA) only. Patient Fact Sheet:  [employer]  Provider Fact Sheet: [employer]User[employer]  EUA: [email or web address]  IFU: [email or web address]    Methodology:  RT-PCR      INFLUENZA A 10/11/2022 NOT DETECTED  NOT DETECTED Final    INFLUENZA B 10/11/2022 NOT DETECTED  NOT DETECTED Final    TSH 10/11/2022 1.82  0.27 - 4.20 uIU/mL Final    Ventricular Rate 10/11/2022 53  BPM Final    Atrial Rate 10/11/2022 53  BPM Final    QRS Duration 10/11/2022 148  ms Final    Q-T Interval 10/11/2022 524  ms Final    QTc Calculation (Bazett) 10/11/2022 491  ms Final    R Axis 10/11/2022 3  degrees Final    T Axis 10/11/2022 83  degrees Final    Diagnosis 10/11/2022 Normal sinus rhythm with 1st degree A-V blockLow voltageLeft bundle branch blockAbnormal ECGNo previous ECGs availableConfirmed by Diana Russo MD, ROSALIO ([id number]) on 10/12/2022 7:49:37 AM   Final    Cholesterol, Total 10/11/2022 109  0 - 199 mg/dL Final    Triglycerides 10/11/2022 56  0 - 150 mg/dL Final    HDL 10/11/2022 61 (A)  40 - 60 mg/dL Final    Comment: An HDL cholesterol less than 40 mg/dL is low and  constitutes a coronary heart disease risk factor. An HDL cholesterol greater than 60 mg/dL is a  negative risk factor for coronary heart disease.       LDL Calculated 10/11/2022 37  <100 mg/dL Final    VLDL Cholesterol Calculated 10/11/2022 11  Not Established mg/dL Final    Hemoglobin A1C 10/11/2022 5.9  See comment % Final    Comment: Comment:  Diagnosis of Diabetes: > or = 6.5%  Increased risk of diabetes (Prediabetes): 5.7-6.4%  Glycemic Control: Nonpregnant Adults: <7.0%                    Pregnant: <6.0%        eAG 10/11/2022 122.6  mg/dL Final    Sodium 10/12/2022 135 (A)  136 - 145 mmol/L Final    Potassium reflex Magnesium 10/12/2022 5.2 (A)  3.5 - 5.1 mmol/L Final    Chloride 10/12/2022 96 (A)  99 - 110 mmol/L Final    CO2 10/12/2022 28  21 - 32 mmol/L Final    Anion Gap 10/12/2022 11  3 - 16 Final    Glucose 10/12/2022 71  70 - 99 mg/dL Final    BUN 10/12/2022 30 (A)  7 - 20 mg/dL Final    Creatinine 10/12/2022 1.5 (A)  0.8 - 1.3 mg/dL Final    GFR Non- 10/12/2022 46 (A)  >60 Final    Comment: >60 mL/min/1.73m2 EGFR, calc. for ages 25 and older using the  MDRD formula (not corrected for weight), is valid for stable  renal function. GFR  10/12/2022 55 (A)  >60 Final    Comment: Chronic Kidney Disease: less than 60 ml/min/1.73 sq.m. Kidney Failure: less than 15 ml/min/1.73 sq.m. Results valid for patients 18 years and older. Calcium 10/12/2022 8.7  8.3 - 10.6 mg/dL Final    Sodium 10/13/2022 138  136 - 145 mmol/L Final    Potassium reflex Magnesium 10/13/2022 4.3  3.5 - 5.1 mmol/L Final    Chloride 10/13/2022 100  99 - 110 mmol/L Final    CO2 10/13/2022 29  21 - 32 mmol/L Final    Anion Gap 10/13/2022 9  3 - 16 Final    Glucose 10/13/2022 78  70 - 99 mg/dL Final    BUN 10/13/2022 23 (A)  7 - 20 mg/dL Final    Creatinine 10/13/2022 0.9  0.8 - 1.3 mg/dL Final    GFR Non- 10/13/2022 >60  >60 Final    Comment: >60 mL/min/1.73m2 EGFR, calc. for ages 25 and older using the  MDRD formula (not corrected for weight), is valid for stable  renal function. GFR  10/13/2022 >60  >60 Final    Comment: Chronic Kidney Disease: less than 60 ml/min/1.73 sq.m. Kidney Failure: less than 15 ml/min/1.73 sq.m. Results valid for patients 18 years and older.       Calcium 10/13/2022 8.8  8.3 - 10.6 mg/dL Final            Medications  Current Facility-Administered Medications: [START ON 10/15/2022] potassium chloride (KLOR-CON M) extended release tablet 20 mEq, 20 mEq, Oral, Daily with breakfast  polyethylene glycol (GLYCOLAX) packet 17 g, 17 g, Oral, Daily  furosemide (LASIX) tablet 40 mg, 40 mg, Oral, Daily  OLANZapine (ZYPREXA) tablet 10 mg, 10 mg, Oral, Nightly  pantoprazole (PROTONIX) tablet 20 mg, 20 mg, Oral, BID AC  therapeutic multivitamin-minerals 1 tablet, 1 tablet, Oral, Daily  atorvastatin (LIPITOR) tablet 20 mg, 20 mg, Oral, Nightly  QUEtiapine (SEROQUEL) tablet 25 mg, 25 mg, Oral, Nightly  metoprolol succinate (TOPROL XL) extended release tablet 50 mg, 50 mg, Oral, Nightly  levothyroxine (SYNTHROID) tablet 125 mcg, 125 mcg, Oral, Daily  acetaminophen (TYLENOL) tablet 650 mg, 650 mg, Oral, Q4H PRN  magnesium hydroxide (MILK OF MAGNESIA) 400 MG/5ML suspension 30 mL, 30 mL, Oral, Daily PRN  nicotine polacrilex (COMMIT) lozenge 2 mg, 2 mg, Oral, Q1H PRN  aluminum & magnesium hydroxide-simethicone (MAALOX) 200-200-20 MG/5ML suspension 30 mL, 30 mL, Oral, Q6H PRN  hydrOXYzine HCl (ATARAX) tablet 50 mg, 50 mg, Oral, TID PRN  OLANZapine (ZYPREXA) tablet 10 mg, 10 mg, Oral, Q4H PRN **OR** OLANZapine (ZYPREXA) 10 mg in sterile water 2 mL injection, 10 mg, IntraMUSCular, Q4H PRN  benztropine mesylate (COGENTIN) injection 2 mg, 2 mg, IntraMUSCular, BID PRN  docusate sodium (COLACE) capsule 100 mg, 100 mg, Oral, BID  tamsulosin (FLOMAX) capsule 0.4 mg, 0.4 mg, Oral, Daily  LORazepam (ATIVAN) tablet 1 mg, 1 mg, Oral, Q6H PRN  melatonin tablet 3 mg, 3 mg, Oral, PRN  acetaminophen (TYLENOL) tablet 650 mg, 650 mg, Oral, Q6H PRN    ASSESSMENT AND PLAN    Principal Problem:    Schizoaffective disorder (HCC)  Active Problems:    Hypercholesteremia    New onset seizure (HCC)    Other specified hypothyroidism    Paroxysmal atrial fibrillation (HCC)    Benign prostatic hyperplasia, presence of lower urinary tract symptoms unspecified    Cardiomyopathy ([address]    CKD (chronic kidney disease)  Resolved Problems:    * No resolved hospital problems. *       1. Patient s symptoms are improving  2. Probable discharge is next week  3. Discharge planning is complete  4. Suicidal ideation is  NONE  5. Total time with patient was 40 minutes and more than 50 % of that time was spent counseling the patient on their symptoms, treatment and expected goals.

## 2022-10-14 NOTE — FLOWSHEET NOTE
Senior Purposeful Rounding     Position:Repositions self     Physical Environment:Room free from clutter, Clear path to bathroom , Adequate lighting, and No safety hazards noted, bed alarm on     Pain Rating/ Nonverbal Pain Behaviors:denies; 0     Pain interventions Attempted: n/a     Patient Toileted:Continent of urine

## 2022-10-14 NOTE — FLOWSHEET NOTE
Senior Purposeful Rounding     Position:Sitting in dining room     Physical Environment:Room free from clutter, Clear path to bathroom , Adequate lighting, and No safety hazards noted, bed alarm on     Pain Rating/ Nonverbal Pain Behaviors:0, denies     Pain interventions Attempted: n/a     Patient Toileted:Dry.  Attempted urinal.

## 2022-10-14 NOTE — FLOWSHEET NOTE
Senior Purposeful Rounding     Position:Repositions self     Physical Environment:Room free from clutter, Clear path to bathroom , Adequate lighting, and No safety hazards noted, bed alarm on     Pain Rating/ Nonverbal Pain Behaviors:0, asleep     Pain interventions Attempted: n/a     Patient Toileted:No void

## 2022-10-15 ENCOUNTER — APPOINTMENT (OUTPATIENT)
Dept: GENERAL RADIOLOGY | Age: 77
End: 2022-10-15
Payer: MEDICARE

## 2022-10-15 ENCOUNTER — HOSPITAL ENCOUNTER (INPATIENT)
Age: 77
LOS: 13 days | Discharge: NURSING, REHAB OR LONG-TERM CARE | DRG: 308 | End: 2022-10-28
Attending: EMERGENCY MEDICINE | Admitting: INTERNAL MEDICINE
Payer: MEDICARE

## 2022-10-15 ENCOUNTER — HOSPITAL ENCOUNTER (EMERGENCY)
Age: 77
Discharge: ANOTHER HOSPITAL | End: 2022-10-15
Attending: EMERGENCY MEDICINE
Payer: MEDICARE

## 2022-10-15 VITALS
WEIGHT: 180.2 LBS | BODY MASS INDEX: 27.31 KG/M2 | HEART RATE: 56 BPM | DIASTOLIC BLOOD PRESSURE: 69 MMHG | HEIGHT: 68 IN | TEMPERATURE: 98 F | SYSTOLIC BLOOD PRESSURE: 110 MMHG | OXYGEN SATURATION: 96 % | RESPIRATION RATE: 18 BRPM

## 2022-10-15 VITALS
RESPIRATION RATE: 23 BRPM | OXYGEN SATURATION: 98 % | HEART RATE: 51 BPM | BODY MASS INDEX: 27.28 KG/M2 | DIASTOLIC BLOOD PRESSURE: 59 MMHG | HEIGHT: 68 IN | SYSTOLIC BLOOD PRESSURE: 109 MMHG | TEMPERATURE: 88.2 F | WEIGHT: 180 LBS

## 2022-10-15 DIAGNOSIS — I44.2 COMPLETE HEART BLOCK (HCC): Primary | ICD-10-CM

## 2022-10-15 DIAGNOSIS — T68.XXXA HYPOTHERMIA, INITIAL ENCOUNTER: ICD-10-CM

## 2022-10-15 DIAGNOSIS — I42.8 NON-ISCHEMIC CARDIOMYOPATHY (HCC): ICD-10-CM

## 2022-10-15 DIAGNOSIS — R00.1 BRADYCARDIA: Primary | ICD-10-CM

## 2022-10-15 DIAGNOSIS — R55 SYNCOPE, CARDIOGENIC: ICD-10-CM

## 2022-10-15 DIAGNOSIS — I44.2 COMPLETE HEART BLOCK (HCC): ICD-10-CM

## 2022-10-15 DIAGNOSIS — I49.3 PVC'S (PREMATURE VENTRICULAR CONTRACTIONS): ICD-10-CM

## 2022-10-15 PROBLEM — F03.90 DEMENTIA (HCC): Status: ACTIVE | Noted: 2022-10-15

## 2022-10-15 LAB
A/G RATIO: 1 (ref 1.1–2.2)
A/G RATIO: 1.1 (ref 1.1–2.2)
ALBUMIN SERPL-MCNC: 3.4 G/DL (ref 3.4–5)
ALBUMIN SERPL-MCNC: 3.6 G/DL (ref 3.4–5)
ALP BLD-CCNC: 104 U/L (ref 40–129)
ALP BLD-CCNC: 109 U/L (ref 40–129)
ALT SERPL-CCNC: 46 U/L (ref 10–40)
ALT SERPL-CCNC: 50 U/L (ref 10–40)
ANION GAP SERPL CALCULATED.3IONS-SCNC: 5 MMOL/L (ref 3–16)
ANION GAP SERPL CALCULATED.3IONS-SCNC: 8 MMOL/L (ref 3–16)
AST SERPL-CCNC: 43 U/L (ref 15–37)
AST SERPL-CCNC: 95 U/L (ref 15–37)
BASOPHILS ABSOLUTE: 0 K/UL (ref 0–0.2)
BASOPHILS RELATIVE PERCENT: 0.4 %
BILIRUB SERPL-MCNC: <0.2 MG/DL (ref 0–1)
BILIRUB SERPL-MCNC: <0.2 MG/DL (ref 0–1)
BUN BLDV-MCNC: 27 MG/DL (ref 7–20)
BUN BLDV-MCNC: 28 MG/DL (ref 7–20)
CALCIUM SERPL-MCNC: 8.6 MG/DL (ref 8.3–10.6)
CALCIUM SERPL-MCNC: 8.9 MG/DL (ref 8.3–10.6)
CHLORIDE BLD-SCNC: 95 MMOL/L (ref 99–110)
CHLORIDE BLD-SCNC: 95 MMOL/L (ref 99–110)
CO2: 27 MMOL/L (ref 21–32)
CO2: 27 MMOL/L (ref 21–32)
CREAT SERPL-MCNC: 1.1 MG/DL (ref 0.8–1.3)
CREAT SERPL-MCNC: 1.2 MG/DL (ref 0.8–1.3)
DIGOXIN LEVEL: <0.3 NG/ML (ref 0.8–2)
EOSINOPHILS ABSOLUTE: 0.1 K/UL (ref 0–0.6)
EOSINOPHILS RELATIVE PERCENT: 1.3 %
GFR AFRICAN AMERICAN: >60
GFR AFRICAN AMERICAN: >60
GFR NON-AFRICAN AMERICAN: 59
GFR NON-AFRICAN AMERICAN: >60
GLUCOSE BLD-MCNC: 126 MG/DL (ref 70–99)
GLUCOSE BLD-MCNC: 130 MG/DL (ref 70–99)
HCT VFR BLD CALC: 33.2 % (ref 40.5–52.5)
HEMOGLOBIN: 10.5 G/DL (ref 13.5–17.5)
LYMPHOCYTES ABSOLUTE: 0.4 K/UL (ref 1–5.1)
LYMPHOCYTES RELATIVE PERCENT: 9 %
MAGNESIUM: 2.5 MG/DL (ref 1.8–2.4)
MAGNESIUM: 2.7 MG/DL (ref 1.8–2.4)
MCH RBC QN AUTO: 29.1 PG (ref 26–34)
MCHC RBC AUTO-ENTMCNC: 31.8 G/DL (ref 31–36)
MCV RBC AUTO: 91.6 FL (ref 80–100)
MONOCYTES ABSOLUTE: 0.5 K/UL (ref 0–1.3)
MONOCYTES RELATIVE PERCENT: 11.1 %
NEUTROPHILS ABSOLUTE: 3.4 K/UL (ref 1.7–7.7)
NEUTROPHILS RELATIVE PERCENT: 78.2 %
PDW BLD-RTO: 16.4 % (ref 12.4–15.4)
PHOSPHORUS: 3.9 MG/DL (ref 2.5–4.9)
PLATELET # BLD: 119 K/UL (ref 135–450)
PMV BLD AUTO: 8.7 FL (ref 5–10.5)
POTASSIUM SERPL-SCNC: 5.4 MMOL/L (ref 3.5–5.1)
PRO-BNP: 270 PG/ML (ref 0–449)
PRO-BNP: 304 PG/ML (ref 0–449)
RBC # BLD: 3.62 M/UL (ref 4.2–5.9)
REASON FOR REJECTION: NORMAL
REJECTED TEST: NORMAL
SODIUM BLD-SCNC: 127 MMOL/L (ref 136–145)
SODIUM BLD-SCNC: 130 MMOL/L (ref 136–145)
SPECIMEN STATUS: NORMAL
TOTAL PROTEIN: 6.5 G/DL (ref 6.4–8.2)
TOTAL PROTEIN: 7.1 G/DL (ref 6.4–8.2)
TROPONIN: <0.01 NG/ML
TROPONIN: <0.01 NG/ML
TSH REFLEX: 2.04 UIU/ML (ref 0.27–4.2)
WBC # BLD: 4.4 K/UL (ref 4–11)

## 2022-10-15 PROCEDURE — 85025 COMPLETE CBC W/AUTO DIFF WBC: CPT

## 2022-10-15 PROCEDURE — 6370000000 HC RX 637 (ALT 250 FOR IP): Performed by: NURSE PRACTITIONER

## 2022-10-15 PROCEDURE — 33210 INSERT ELECTRD/PM CATH SNGL: CPT | Performed by: INTERNAL MEDICINE

## 2022-10-15 PROCEDURE — 2000000000 HC ICU R&B

## 2022-10-15 PROCEDURE — 99233 SBSQ HOSP IP/OBS HIGH 50: CPT | Performed by: PSYCHIATRY & NEUROLOGY

## 2022-10-15 PROCEDURE — 93005 ELECTROCARDIOGRAM TRACING: CPT | Performed by: PSYCHIATRY & NEUROLOGY

## 2022-10-15 PROCEDURE — 84443 ASSAY THYROID STIM HORMONE: CPT

## 2022-10-15 PROCEDURE — 93005 ELECTROCARDIOGRAM TRACING: CPT | Performed by: EMERGENCY MEDICINE

## 2022-10-15 PROCEDURE — 80162 ASSAY OF DIGOXIN TOTAL: CPT

## 2022-10-15 PROCEDURE — 83880 ASSAY OF NATRIURETIC PEPTIDE: CPT

## 2022-10-15 PROCEDURE — 84484 ASSAY OF TROPONIN QUANT: CPT

## 2022-10-15 PROCEDURE — 96365 THER/PROPH/DIAG IV INF INIT: CPT

## 2022-10-15 PROCEDURE — 99285 EMERGENCY DEPT VISIT HI MDM: CPT

## 2022-10-15 PROCEDURE — 6360000002 HC RX W HCPCS: Performed by: EMERGENCY MEDICINE

## 2022-10-15 PROCEDURE — 33210 INSERT ELECTRD/PM CATH SNGL: CPT

## 2022-10-15 PROCEDURE — 6370000000 HC RX 637 (ALT 250 FOR IP): Performed by: PSYCHIATRY & NEUROLOGY

## 2022-10-15 PROCEDURE — 2720000010 HC SURG SUPPLY STERILE

## 2022-10-15 PROCEDURE — 71045 X-RAY EXAM CHEST 1 VIEW: CPT

## 2022-10-15 PROCEDURE — 83735 ASSAY OF MAGNESIUM: CPT

## 2022-10-15 PROCEDURE — 2700000000 HC OXYGEN THERAPY PER DAY

## 2022-10-15 PROCEDURE — 96375 TX/PRO/DX INJ NEW DRUG ADDON: CPT

## 2022-10-15 PROCEDURE — 99291 CRITICAL CARE FIRST HOUR: CPT | Performed by: INTERNAL MEDICINE

## 2022-10-15 PROCEDURE — 80053 COMPREHEN METABOLIC PANEL: CPT

## 2022-10-15 PROCEDURE — 84100 ASSAY OF PHOSPHORUS: CPT

## 2022-10-15 PROCEDURE — C1894 INTRO/SHEATH, NON-LASER: HCPCS

## 2022-10-15 PROCEDURE — 5A1223Z PERFORMANCE OF CARDIAC PACING, CONTINUOUS: ICD-10-PCS | Performed by: INTERNAL MEDICINE

## 2022-10-15 PROCEDURE — 94761 N-INVAS EAR/PLS OXIMETRY MLT: CPT

## 2022-10-15 RX ORDER — ATROPINE SULFATE 1 MG/ML
1 INJECTION, SOLUTION INTRAMUSCULAR; INTRAVENOUS; SUBCUTANEOUS ONCE
Status: COMPLETED | OUTPATIENT
Start: 2022-10-15 | End: 2022-10-15

## 2022-10-15 RX ORDER — METOPROLOL SUCCINATE 25 MG/1
25 TABLET, EXTENDED RELEASE ORAL NIGHTLY
Status: DISCONTINUED | OUTPATIENT
Start: 2022-10-15 | End: 2022-10-15 | Stop reason: HOSPADM

## 2022-10-15 RX ORDER — DOPAMINE HYDROCHLORIDE 160 MG/100ML
1-20 INJECTION, SOLUTION INTRAVENOUS CONTINUOUS
Status: DISCONTINUED | OUTPATIENT
Start: 2022-10-15 | End: 2022-10-18

## 2022-10-15 RX ORDER — 0.9 % SODIUM CHLORIDE 0.9 %
1000 INTRAVENOUS SOLUTION INTRAVENOUS ONCE
Status: DISCONTINUED | OUTPATIENT
Start: 2022-10-15 | End: 2022-10-15 | Stop reason: HOSPADM

## 2022-10-15 RX ORDER — DOPAMINE HYDROCHLORIDE 160 MG/100ML
1-20 INJECTION, SOLUTION INTRAVENOUS CONTINUOUS
Status: DISCONTINUED | OUTPATIENT
Start: 2022-10-15 | End: 2022-10-15 | Stop reason: HOSPADM

## 2022-10-15 RX ADMIN — Medication 1000 ML: at 20:47

## 2022-10-15 RX ADMIN — DOPAMINE HYDROCHLORIDE 2.5 MCG/KG/MIN: 160 INJECTION, SOLUTION INTRAVENOUS at 20:49

## 2022-10-15 RX ADMIN — ATROPINE SULFATE 1 MG: 1 INJECTION, SOLUTION INTRAMUSCULAR; INTRAVENOUS; SUBCUTANEOUS at 20:50

## 2022-10-15 RX ADMIN — DOPAMINE HYDROCHLORIDE IN DEXTROSE 2.5 MCG/KG/MIN: 1.6 INJECTION, SOLUTION INTRAVENOUS at 22:11

## 2022-10-15 RX ADMIN — TAMSULOSIN HYDROCHLORIDE 0.4 MG: 0.4 CAPSULE ORAL at 08:43

## 2022-10-15 RX ADMIN — DOCUSATE SODIUM 100 MG: 100 CAPSULE, LIQUID FILLED ORAL at 08:43

## 2022-10-15 RX ADMIN — MULTIPLE VITAMINS W/ MINERALS TAB 1 TABLET: TAB at 08:43

## 2022-10-15 RX ADMIN — PANTOPRAZOLE SODIUM 20 MG: 20 TABLET, DELAYED RELEASE ORAL at 05:27

## 2022-10-15 RX ADMIN — POTASSIUM CHLORIDE 20 MEQ: 1500 TABLET, EXTENDED RELEASE ORAL at 08:43

## 2022-10-15 RX ADMIN — POLYETHYLENE GLYCOL (3350) 17 G: 17 POWDER, FOR SOLUTION ORAL at 08:44

## 2022-10-15 RX ADMIN — LEVOTHYROXINE SODIUM 125 MCG: 125 TABLET ORAL at 05:27

## 2022-10-15 RX ADMIN — PANTOPRAZOLE SODIUM 20 MG: 20 TABLET, DELAYED RELEASE ORAL at 16:14

## 2022-10-15 ASSESSMENT — PAIN - FUNCTIONAL ASSESSMENT: PAIN_FUNCTIONAL_ASSESSMENT: NONE - DENIES PAIN

## 2022-10-15 NOTE — PROGRESS NOTES
Department of Psychiatry  AttendingProgress Note  Chief Complaint: psychosis  Adrienne Chambers is doing relatively well. He is on unit,eating well . Articulation difficulties. Tends to get fixated on difficult topics daily. No med changes  Patient's chart was reviewed and collaborated with  about the treatment plan. SUBJECTIVE:    Patient is feeling better. Suicidal ideation:  denies suicidal ideation. Patient does not have medication side effects. ROS: Patient has new complaints: no  Sleeping adequately:  Yes   Appetite adequate: Yes  Attending groups: Yes  Visitors:Yes    OBJECTIVE    Physical  VITALS:  /69   Pulse 56   Temp 98 °F (36.7 °C) (Temporal)   Resp 18   Ht 5' 8\" (1.727 m)   Wt 180 lb 3.2 oz (81.7 kg)   SpO2 96%   BMI 27.40 kg/m²     Mental Status Examination:  Patients appearance was street clothes. Thoughts are Paucity of Ideas. Homicidal ideations none. No abnormal movements, tics or mannerisms. Memory intact Aims 0. Concentration Fair. Alert and oriented X 4. Insight and Judgement impaired insight. Patient was cooperative. Patient gait WC. Mood anxious and constricted, affect anxiety Hallucinations Absent, suicidal ideations no specific plan to harm self Speech articulation error  Data  Labs:   Admission on 10/11/2022   Component Date Value Ref Range Status    SARS-CoV-2 RNA, RT PCR 10/11/2022 NOT DETECTED  NOT DETECTED Final    Comment: Not Detected results do not preclude SARS-CoV-2 infection and  should not be used as the sole basis for patient management  decisions. Results must be combined with clinical observations,  patient history, and epidemiological information. Testing was performed using ELIZABETH AGUSTIN SARS-CoV-2 and Influenza A/B  nucleic acid assay.  This test is a multiplex Real-Time Reverse  Transcriptase Polymerase Chain Reaction (RT-PCR)-based in vitro  diagnostic test intended for the qualitative detection of nucleic  acids from SARS-CoV-2, influenza A, and influenza B in nasopharyngeal  and nasal swab specimens for use under the FDAs Emergency Use  Authorization (EUA) only. Patient Fact Sheet:  FindDrives.pl  Provider Fact Sheet: FindDrives.pl  EUA: FindDrives.pl  IFU: FindDrives.pl    Methodology:  RT-PCR      INFLUENZA A 10/11/2022 NOT DETECTED  NOT DETECTED Final    INFLUENZA B 10/11/2022 NOT DETECTED  NOT DETECTED Final    TSH 10/11/2022 1.82  0.27 - 4.20 uIU/mL Final    Ventricular Rate 10/11/2022 53  BPM Final    Atrial Rate 10/11/2022 53  BPM Final    QRS Duration 10/11/2022 148  ms Final    Q-T Interval 10/11/2022 524  ms Final    QTc Calculation (Bazett) 10/11/2022 491  ms Final    R Axis 10/11/2022 3  degrees Final    T Axis 10/11/2022 83  degrees Final    Diagnosis 10/11/2022 Normal sinus rhythm with 1st degree A-V blockLow voltageLeft bundle branch blockAbnormal ECGNo previous ECGs availableConfirmed by ROSALIO Moore MD ([id number]) on 10/12/2022 7:49:37 AM   Final    Cholesterol, Total 10/11/2022 109  0 - 199 mg/dL Final    Triglycerides 10/11/2022 56  0 - 150 mg/dL Final    HDL 10/11/2022 61 (A)  40 - 60 mg/dL Final    Comment: An HDL cholesterol less than 40 mg/dL is low and  constitutes a coronary heart disease risk factor. An HDL cholesterol greater than 60 mg/dL is a  negative risk factor for coronary heart disease.       LDL Calculated 10/11/2022 37  <100 mg/dL Final    VLDL Cholesterol Calculated 10/11/2022 11  Not Established mg/dL Final    Hemoglobin A1C 10/11/2022 5.9  See comment % Final    Comment: Comment:  Diagnosis of Diabetes: > or = 6.5%  Increased risk of diabetes (Prediabetes): 5.7-6.4%  Glycemic Control: Nonpregnant Adults: <7.0%                    Pregnant: <6.0%        eAG 10/11/2022 122.6  mg/dL Final    Sodium 10/12/2022 135 (A)  136 - 145 mmol/L Final    Potassium reflex Magnesium 10/12/2022 5.2 (A)  3.5 - 5.1 mmol/L Final    Chloride 10/12/2022 96 (A)  99 - 110 mmol/L Final    CO2 10/12/2022 28  21 - 32 mmol/L Final    Anion Gap 10/12/2022 11  3 - 16 Final    Glucose 10/12/2022 71  70 - 99 mg/dL Final    BUN 10/12/2022 30 (A)  7 - 20 mg/dL Final    Creatinine 10/12/2022 1.5 (A)  0.8 - 1.3 mg/dL Final    GFR Non- 10/12/2022 46 (A)  >60 Final    Comment: >60 mL/min/1.73m2 EGFR, calc. for ages 25 and older using the  MDRD formula (not corrected for weight), is valid for stable  renal function. GFR  10/12/2022 55 (A)  >60 Final    Comment: Chronic Kidney Disease: less than 60 ml/min/1.73 sq.m. Kidney Failure: less than 15 ml/min/1.73 sq.m. Results valid for patients 18 years and older. Calcium 10/12/2022 8.7  8.3 - 10.6 mg/dL Final    Sodium 10/13/2022 138  136 - 145 mmol/L Final    Potassium reflex Magnesium 10/13/2022 4.3  3.5 - 5.1 mmol/L Final    Chloride 10/13/2022 100  99 - 110 mmol/L Final    CO2 10/13/2022 29  21 - 32 mmol/L Final    Anion Gap 10/13/2022 9  3 - 16 Final    Glucose 10/13/2022 78  70 - 99 mg/dL Final    BUN 10/13/2022 23 (A)  7 - 20 mg/dL Final    Creatinine 10/13/2022 0.9  0.8 - 1.3 mg/dL Final    GFR Non- 10/13/2022 >60  >60 Final    Comment: >60 mL/min/1.73m2 EGFR, calc. for ages 25 and older using the  MDRD formula (not corrected for weight), is valid for stable  renal function. GFR  10/13/2022 >60  >60 Final    Comment: Chronic Kidney Disease: less than 60 ml/min/1.73 sq.m. Kidney Failure: less than 15 ml/min/1.73 sq.m. Results valid for patients 18 years and older.       Calcium 10/13/2022 8.8  8.3 - 10.6 mg/dL Final            Medications  Current Facility-Administered Medications: potassium chloride (KLOR-CON M) extended release tablet 20 mEq, 20 mEq, Oral, Daily with breakfast  polyethylene glycol (GLYCOLAX) packet 17 g, 17 g, Oral, Daily  furosemide (LASIX) tablet 40 mg, 40 mg, Oral, Daily  OLANZapine (ZYPREXA) tablet 10 mg, 10 mg, Oral, Nightly  pantoprazole (PROTONIX) tablet 20 mg, 20 mg, Oral, BID AC  therapeutic multivitamin-minerals 1 tablet, 1 tablet, Oral, Daily  atorvastatin (LIPITOR) tablet 20 mg, 20 mg, Oral, Nightly  QUEtiapine (SEROQUEL) tablet 25 mg, 25 mg, Oral, Nightly  metoprolol succinate (TOPROL XL) extended release tablet 50 mg, 50 mg, Oral, Nightly  levothyroxine (SYNTHROID) tablet 125 mcg, 125 mcg, Oral, Daily  acetaminophen (TYLENOL) tablet 650 mg, 650 mg, Oral, Q4H PRN  magnesium hydroxide (MILK OF MAGNESIA) 400 MG/5ML suspension 30 mL, 30 mL, Oral, Daily PRN  nicotine polacrilex (COMMIT) lozenge 2 mg, 2 mg, Oral, Q1H PRN  aluminum & magnesium hydroxide-simethicone (MAALOX) 200-200-20 MG/5ML suspension 30 mL, 30 mL, Oral, Q6H PRN  hydrOXYzine HCl (ATARAX) tablet 50 mg, 50 mg, Oral, TID PRN  OLANZapine (ZYPREXA) tablet 10 mg, 10 mg, Oral, Q4H PRN **OR** OLANZapine (ZYPREXA) 10 mg in sterile water 2 mL injection, 10 mg, IntraMUSCular, Q4H PRN  benztropine mesylate (COGENTIN) injection 2 mg, 2 mg, IntraMUSCular, BID PRN  docusate sodium (COLACE) capsule 100 mg, 100 mg, Oral, BID  tamsulosin (FLOMAX) capsule 0.4 mg, 0.4 mg, Oral, Daily  LORazepam (ATIVAN) tablet 1 mg, 1 mg, Oral, Q6H PRN  melatonin tablet 3 mg, 3 mg, Oral, PRN  acetaminophen (TYLENOL) tablet 650 mg, 650 mg, Oral, Q6H PRN    ASSESSMENT AND PLAN    Principal Problem:    Schizoaffective disorder (HCC)  Active Problems:    Hypercholesteremia    New onset seizure (HCC)    Other specified hypothyroidism    Paroxysmal atrial fibrillation (HCC)    Benign prostatic hyperplasia, presence of lower urinary tract symptoms unspecified    Cardiomyopathy ([facility] [address]    CKD (chronic kidney disease)  Resolved Problems:    * No resolved hospital problems. *       1. Patient s symptoms   are improving  2. Probable discharge is next week  3. Discharge planning is complete  4. Suicidal ideation is  none  5.  Total time with patient was 40 minutes and more than 50 % of that time was spent counseling the patient on their symptoms, treatment and expected goals.

## 2022-10-15 NOTE — PROGRESS NOTES
10/15/22 1025   Encounter Summary   Encounter Overview/Reason  Behavioral Health   Service Provided For: Patient   Referral/Consult From: [address] Family members   Last Encounter  10/15/22   Complexity of Encounter Low   Begin Time 0945   End Time  0955   Total Time Calculated 10 min   Spiritual/Emotional needs   Type Spiritual Support

## 2022-10-15 NOTE — PLAN OF CARE
Problem: Chronic Conditions and Co-morbidities  Goal: Patient's chronic conditions and co-morbidity symptoms are monitored and maintained or improved  10/15/2022 0202 by Ganesh Corona RN  Outcome: Progressing  Flowsheets (Taken 10/15/2022 0149)  Care Plan - Patient's Chronic Conditions and Co-Morbidity Symptoms are Monitored and Maintained or Improved: Monitor and assess patient's chronic conditions and comorbid symptoms for stability, deterioration, or improvement  10/14/2022 2119 by Gavino Moreno RN  Outcome: Progressing     Problem: Safety - Adult  Goal: Free from fall injury  10/14/2022 2119 by Gavino Moreno RN  Outcome: Progressing

## 2022-10-15 NOTE — PLAN OF CARE
Pt. Is alert and oriented to self and place, denies SI HI AVH, no aggressive or combative behaviors noted, received medications, incontinent of bladder during the night, had a med. BM today, garbled speech, social with staff and peers.

## 2022-10-16 ENCOUNTER — APPOINTMENT (OUTPATIENT)
Dept: GENERAL RADIOLOGY | Age: 77
DRG: 308 | End: 2022-10-16
Payer: MEDICARE

## 2022-10-16 PROBLEM — D50.9 NORMOCYTIC HYPOCHROMIC ANEMIA: Status: ACTIVE | Noted: 2022-10-16

## 2022-10-16 PROBLEM — R56.9 OBSERVED SEIZURE-LIKE ACTIVITY (HCC): Status: ACTIVE | Noted: 2022-10-16

## 2022-10-16 PROBLEM — I44.7 LBBB (LEFT BUNDLE BRANCH BLOCK): Status: ACTIVE | Noted: 2022-10-16

## 2022-10-16 PROBLEM — J96.01 ACUTE RESPIRATORY FAILURE WITH HYPOXIA (HCC): Status: ACTIVE | Noted: 2022-10-16

## 2022-10-16 PROBLEM — I95.9 HYPOTENSION: Status: ACTIVE | Noted: 2022-10-16

## 2022-10-16 PROBLEM — I42.8 NON-ISCHEMIC CARDIOMYOPATHY (HCC): Status: ACTIVE | Noted: 2022-10-16

## 2022-10-16 PROBLEM — I44.2 COMPLETE HEART BLOCK (HCC): Status: ACTIVE | Noted: 2022-10-16

## 2022-10-16 PROBLEM — E87.1 HYPONATREMIA: Status: ACTIVE | Noted: 2022-10-16

## 2022-10-16 PROBLEM — R00.1 SYMPTOMATIC BRADYCARDIA: Status: ACTIVE | Noted: 2022-10-16

## 2022-10-16 PROBLEM — R91.8 PULMONARY INFILTRATES: Status: ACTIVE | Noted: 2022-10-16

## 2022-10-16 PROBLEM — I50.20 SYSTOLIC HEART FAILURE (HCC): Status: ACTIVE | Noted: 2022-10-16

## 2022-10-16 PROBLEM — N17.9 AKI (ACUTE KIDNEY INJURY) (HCC): Status: ACTIVE | Noted: 2022-10-16

## 2022-10-16 PROBLEM — E16.2 HYPOGLYCEMIA: Status: ACTIVE | Noted: 2022-10-16

## 2022-10-16 PROBLEM — R57.9 SHOCK CIRCULATORY (HCC): Status: ACTIVE | Noted: 2022-10-16

## 2022-10-16 PROBLEM — T68.XXXA HYPOTHERMIA: Status: ACTIVE | Noted: 2022-10-16

## 2022-10-16 PROBLEM — E87.5 HYPERKALEMIA: Status: ACTIVE | Noted: 2022-10-16

## 2022-10-16 LAB
A/G RATIO: 1.4 (ref 1.1–2.2)
ALBUMIN SERPL-MCNC: 3.9 G/DL (ref 3.4–5)
ALP BLD-CCNC: 115 U/L (ref 40–129)
ALT SERPL-CCNC: 50 U/L (ref 10–40)
ANION GAP SERPL CALCULATED.3IONS-SCNC: 11 MMOL/L (ref 3–16)
ANION GAP SERPL CALCULATED.3IONS-SCNC: 9 MMOL/L (ref 3–16)
AST SERPL-CCNC: 43 U/L (ref 15–37)
BASOPHILS ABSOLUTE: 0 K/UL (ref 0–0.2)
BASOPHILS RELATIVE PERCENT: 0.1 %
BILIRUB SERPL-MCNC: 0.3 MG/DL (ref 0–1)
BUN BLDV-MCNC: 30 MG/DL (ref 7–20)
BUN BLDV-MCNC: 34 MG/DL (ref 7–20)
CALCIUM SERPL-MCNC: 8.9 MG/DL (ref 8.3–10.6)
CALCIUM SERPL-MCNC: 8.9 MG/DL (ref 8.3–10.6)
CHLORIDE BLD-SCNC: 95 MMOL/L (ref 99–110)
CHLORIDE BLD-SCNC: 96 MMOL/L (ref 99–110)
CO2: 27 MMOL/L (ref 21–32)
CO2: 27 MMOL/L (ref 21–32)
CREAT SERPL-MCNC: 1.2 MG/DL (ref 0.8–1.3)
CREAT SERPL-MCNC: 1.4 MG/DL (ref 0.8–1.3)
DIGOXIN LEVEL: <0.3 NG/ML (ref 0.8–2)
EKG ATRIAL RATE: 18 BPM
EKG ATRIAL RATE: 30 BPM
EKG ATRIAL RATE: 35 BPM
EKG DIAGNOSIS: NORMAL
EKG Q-T INTERVAL: 608 MS
EKG Q-T INTERVAL: 662 MS
EKG Q-T INTERVAL: 678 MS
EKG QRS DURATION: 154 MS
EKG QRS DURATION: 156 MS
EKG QRS DURATION: 158 MS
EKG QTC CALCULATION (BAZETT): 434 MS
EKG QTC CALCULATION (BAZETT): 462 MS
EKG QTC CALCULATION (BAZETT): 470 MS
EKG R AXIS: -8 DEGREES
EKG R AXIS: 185 DEGREES
EKG R AXIS: 2 DEGREES
EKG T AXIS: -37 DEGREES
EKG T AXIS: 159 DEGREES
EKG T AXIS: 85 DEGREES
EKG VENTRICULAR RATE: 26 BPM
EKG VENTRICULAR RATE: 28 BPM
EKG VENTRICULAR RATE: 36 BPM
EOSINOPHILS ABSOLUTE: 0 K/UL (ref 0–0.6)
EOSINOPHILS RELATIVE PERCENT: 0.5 %
GFR AFRICAN AMERICAN: 60
GFR AFRICAN AMERICAN: >60
GFR NON-AFRICAN AMERICAN: 49
GFR NON-AFRICAN AMERICAN: 59
GLUCOSE BLD-MCNC: 115 MG/DL (ref 70–99)
GLUCOSE BLD-MCNC: 67 MG/DL (ref 70–99)
GLUCOSE BLD-MCNC: 71 MG/DL (ref 70–99)
GLUCOSE BLD-MCNC: 73 MG/DL (ref 70–99)
GLUCOSE BLD-MCNC: 81 MG/DL (ref 70–99)
GLUCOSE BLD-MCNC: 87 MG/DL (ref 70–99)
HCT VFR BLD CALC: 34.4 % (ref 40.5–52.5)
HEMOGLOBIN: 11.3 G/DL (ref 13.5–17.5)
INR BLD: 1.04 (ref 0.87–1.14)
LITHIUM DOSE AMOUNT: ABNORMAL
LITHIUM LEVEL: <0.1 MMOL/L (ref 0.6–1.2)
LYMPHOCYTES ABSOLUTE: 0.4 K/UL (ref 1–5.1)
LYMPHOCYTES RELATIVE PERCENT: 6.5 %
MAGNESIUM: 2.7 MG/DL (ref 1.8–2.4)
MCH RBC QN AUTO: 29.7 PG (ref 26–34)
MCHC RBC AUTO-ENTMCNC: 32.9 G/DL (ref 31–36)
MCV RBC AUTO: 90.2 FL (ref 80–100)
MONOCYTES ABSOLUTE: 0.5 K/UL (ref 0–1.3)
MONOCYTES RELATIVE PERCENT: 8 %
NEUTROPHILS ABSOLUTE: 5.4 K/UL (ref 1.7–7.7)
NEUTROPHILS RELATIVE PERCENT: 84.9 %
PDW BLD-RTO: 16.1 % (ref 12.4–15.4)
PERFORMED ON: ABNORMAL
PERFORMED ON: NORMAL
PHOSPHORUS: 4.4 MG/DL (ref 2.5–4.9)
PLATELET # BLD: 143 K/UL (ref 135–450)
PMV BLD AUTO: 8.5 FL (ref 5–10.5)
POTASSIUM REFLEX MAGNESIUM: 6.2 MMOL/L (ref 3.5–5.1)
POTASSIUM SERPL-SCNC: 5.6 MMOL/L (ref 3.5–5.1)
PROTHROMBIN TIME: 13.5 SEC (ref 11.7–14.5)
RBC # BLD: 3.81 M/UL (ref 4.2–5.9)
SODIUM BLD-SCNC: 131 MMOL/L (ref 136–145)
SODIUM BLD-SCNC: 134 MMOL/L (ref 136–145)
TOTAL PROTEIN: 6.6 G/DL (ref 6.4–8.2)
WBC # BLD: 6.3 K/UL (ref 4–11)

## 2022-10-16 PROCEDURE — 82533 TOTAL CORTISOL: CPT

## 2022-10-16 PROCEDURE — 94761 N-INVAS EAR/PLS OXIMETRY MLT: CPT

## 2022-10-16 PROCEDURE — 6370000000 HC RX 637 (ALT 250 FOR IP): Performed by: INTERNAL MEDICINE

## 2022-10-16 PROCEDURE — 80162 ASSAY OF DIGOXIN TOTAL: CPT

## 2022-10-16 PROCEDURE — 99233 SBSQ HOSP IP/OBS HIGH 50: CPT | Performed by: INTERNAL MEDICINE

## 2022-10-16 PROCEDURE — 6360000002 HC RX W HCPCS

## 2022-10-16 PROCEDURE — 31500 INSERT EMERGENCY AIRWAY: CPT

## 2022-10-16 PROCEDURE — 2700000000 HC OXYGEN THERAPY PER DAY

## 2022-10-16 PROCEDURE — 6360000002 HC RX W HCPCS: Performed by: INTERNAL MEDICINE

## 2022-10-16 PROCEDURE — 99291 CRITICAL CARE FIRST HOUR: CPT | Performed by: INTERNAL MEDICINE

## 2022-10-16 PROCEDURE — 83735 ASSAY OF MAGNESIUM: CPT

## 2022-10-16 PROCEDURE — 94002 VENT MGMT INPAT INIT DAY: CPT

## 2022-10-16 PROCEDURE — 93010 ELECTROCARDIOGRAM REPORT: CPT | Performed by: INTERNAL MEDICINE

## 2022-10-16 PROCEDURE — 93005 ELECTROCARDIOGRAM TRACING: CPT | Performed by: INTERNAL MEDICINE

## 2022-10-16 PROCEDURE — 92953 TEMPORARY EXTERNAL PACING: CPT

## 2022-10-16 PROCEDURE — 0BH18EZ INSERTION OF ENDOTRACHEAL AIRWAY INTO TRACHEA, VIA NATURAL OR ARTIFICIAL OPENING ENDOSCOPIC: ICD-10-PCS | Performed by: INTERNAL MEDICINE

## 2022-10-16 PROCEDURE — 71045 X-RAY EXAM CHEST 1 VIEW: CPT

## 2022-10-16 PROCEDURE — 2580000003 HC RX 258: Performed by: INTERNAL MEDICINE

## 2022-10-16 PROCEDURE — 31500 INSERT EMERGENCY AIRWAY: CPT | Performed by: INTERNAL MEDICINE

## 2022-10-16 PROCEDURE — 36415 COLL VENOUS BLD VENIPUNCTURE: CPT

## 2022-10-16 PROCEDURE — 74018 RADEX ABDOMEN 1 VIEW: CPT

## 2022-10-16 PROCEDURE — 80178 ASSAY OF LITHIUM: CPT

## 2022-10-16 PROCEDURE — 80053 COMPREHEN METABOLIC PANEL: CPT

## 2022-10-16 PROCEDURE — 2000000000 HC ICU R&B

## 2022-10-16 PROCEDURE — C9113 INJ PANTOPRAZOLE SODIUM, VIA: HCPCS | Performed by: INTERNAL MEDICINE

## 2022-10-16 PROCEDURE — 85610 PROTHROMBIN TIME: CPT

## 2022-10-16 PROCEDURE — 2500000003 HC RX 250 WO HCPCS: Performed by: INTERNAL MEDICINE

## 2022-10-16 PROCEDURE — 6360000002 HC RX W HCPCS: Performed by: EMERGENCY MEDICINE

## 2022-10-16 PROCEDURE — 87040 BLOOD CULTURE FOR BACTERIA: CPT

## 2022-10-16 PROCEDURE — 85025 COMPLETE CBC W/AUTO DIFF WBC: CPT

## 2022-10-16 PROCEDURE — 5A1945Z RESPIRATORY VENTILATION, 24-96 CONSECUTIVE HOURS: ICD-10-PCS | Performed by: INTERNAL MEDICINE

## 2022-10-16 PROCEDURE — 84100 ASSAY OF PHOSPHORUS: CPT

## 2022-10-16 RX ORDER — LORAZEPAM 2 MG/ML
INJECTION INTRAMUSCULAR
Status: COMPLETED
Start: 2022-10-16 | End: 2022-10-16

## 2022-10-16 RX ORDER — POTASSIUM CHLORIDE 7.45 MG/ML
10 INJECTION INTRAVENOUS PRN
Status: DISCONTINUED | OUTPATIENT
Start: 2022-10-16 | End: 2022-10-28 | Stop reason: HOSPADM

## 2022-10-16 RX ORDER — OLANZAPINE 5 MG/1
10 TABLET ORAL NIGHTLY
Status: DISCONTINUED | OUTPATIENT
Start: 2022-10-16 | End: 2022-10-16

## 2022-10-16 RX ORDER — ATORVASTATIN CALCIUM 10 MG/1
20 TABLET, FILM COATED ORAL NIGHTLY
Status: DISCONTINUED | OUTPATIENT
Start: 2022-10-16 | End: 2022-10-28 | Stop reason: HOSPADM

## 2022-10-16 RX ORDER — CALCIUM GLUCONATE 20 MG/ML
1000 INJECTION, SOLUTION INTRAVENOUS ONCE
Status: COMPLETED | OUTPATIENT
Start: 2022-10-16 | End: 2022-10-16

## 2022-10-16 RX ORDER — FENTANYL CITRATE-0.9 % NACL/PF 10 MCG/ML
25-200 PLASTIC BAG, INJECTION (ML) INTRAVENOUS CONTINUOUS
Status: DISCONTINUED | OUTPATIENT
Start: 2022-10-16 | End: 2022-10-21

## 2022-10-16 RX ORDER — LISINOPRIL 10 MG/1
10 TABLET ORAL DAILY
Status: DISCONTINUED | OUTPATIENT
Start: 2022-10-16 | End: 2022-10-16

## 2022-10-16 RX ORDER — ZIPRASIDONE MESYLATE 20 MG/ML
10 INJECTION, POWDER, LYOPHILIZED, FOR SOLUTION INTRAMUSCULAR EVERY 12 HOURS PRN
Status: DISCONTINUED | OUTPATIENT
Start: 2022-10-16 | End: 2022-10-28 | Stop reason: HOSPADM

## 2022-10-16 RX ORDER — TRAZODONE HYDROCHLORIDE 50 MG/1
50 TABLET ORAL NIGHTLY
Status: DISCONTINUED | OUTPATIENT
Start: 2022-10-16 | End: 2022-10-16

## 2022-10-16 RX ORDER — FUROSEMIDE 10 MG/ML
40 INJECTION INTRAMUSCULAR; INTRAVENOUS ONCE
Status: COMPLETED | OUTPATIENT
Start: 2022-10-16 | End: 2022-10-16

## 2022-10-16 RX ORDER — LEVOTHYROXINE SODIUM 0.12 MG/1
125 TABLET ORAL DAILY
Status: DISCONTINUED | OUTPATIENT
Start: 2022-10-16 | End: 2022-10-28 | Stop reason: HOSPADM

## 2022-10-16 RX ORDER — TAMSULOSIN HYDROCHLORIDE 0.4 MG/1
0.4 CAPSULE ORAL NIGHTLY
Status: DISCONTINUED | OUTPATIENT
Start: 2022-10-16 | End: 2022-10-16

## 2022-10-16 RX ORDER — ACETAMINOPHEN 325 MG/1
650 TABLET ORAL EVERY 4 HOURS PRN
Status: DISCONTINUED | OUTPATIENT
Start: 2022-10-16 | End: 2022-10-28 | Stop reason: HOSPADM

## 2022-10-16 RX ORDER — ONDANSETRON 2 MG/ML
4 INJECTION INTRAMUSCULAR; INTRAVENOUS EVERY 6 HOURS PRN
Status: DISCONTINUED | OUTPATIENT
Start: 2022-10-16 | End: 2022-10-28 | Stop reason: HOSPADM

## 2022-10-16 RX ORDER — LANOLIN ALCOHOL/MO/W.PET/CERES
3 CREAM (GRAM) TOPICAL NIGHTLY PRN
Status: DISCONTINUED | OUTPATIENT
Start: 2022-10-16 | End: 2022-10-28 | Stop reason: HOSPADM

## 2022-10-16 RX ORDER — DEXTROSE MONOHYDRATE 100 MG/ML
INJECTION, SOLUTION INTRAVENOUS CONTINUOUS
Status: DISCONTINUED | OUTPATIENT
Start: 2022-10-16 | End: 2022-10-16

## 2022-10-16 RX ORDER — DEXTROSE MONOHYDRATE 100 MG/ML
INJECTION, SOLUTION INTRAVENOUS CONTINUOUS
Status: DISCONTINUED | OUTPATIENT
Start: 2022-10-16 | End: 2022-10-17

## 2022-10-16 RX ORDER — PANTOPRAZOLE SODIUM 40 MG/10ML
40 INJECTION, POWDER, LYOPHILIZED, FOR SOLUTION INTRAVENOUS DAILY
Status: DISCONTINUED | OUTPATIENT
Start: 2022-10-16 | End: 2022-10-28 | Stop reason: HOSPADM

## 2022-10-16 RX ORDER — HEPARIN SODIUM 5000 [USP'U]/ML
5000 INJECTION, SOLUTION INTRAVENOUS; SUBCUTANEOUS EVERY 8 HOURS SCHEDULED
Status: DISCONTINUED | OUTPATIENT
Start: 2022-10-16 | End: 2022-10-28 | Stop reason: HOSPADM

## 2022-10-16 RX ORDER — SODIUM POLYSTYRENE SULFONATE 15 G/60ML
15 SUSPENSION ORAL; RECTAL ONCE
Status: COMPLETED | OUTPATIENT
Start: 2022-10-16 | End: 2022-10-16

## 2022-10-16 RX ORDER — MAGNESIUM SULFATE 1 G/100ML
1000 INJECTION INTRAVENOUS PRN
Status: DISCONTINUED | OUTPATIENT
Start: 2022-10-16 | End: 2022-10-21

## 2022-10-16 RX ORDER — OLANZAPINE 5 MG/1
20 TABLET ORAL NIGHTLY
Status: DISCONTINUED | OUTPATIENT
Start: 2022-10-16 | End: 2022-10-21

## 2022-10-16 RX ORDER — ACETAMINOPHEN 650 MG/1
650 SUPPOSITORY RECTAL EVERY 4 HOURS PRN
Status: DISCONTINUED | OUTPATIENT
Start: 2022-10-16 | End: 2022-10-28 | Stop reason: HOSPADM

## 2022-10-16 RX ORDER — DOCUSATE SODIUM 100 MG/1
100 CAPSULE, LIQUID FILLED ORAL 2 TIMES DAILY
Status: DISCONTINUED | OUTPATIENT
Start: 2022-10-16 | End: 2022-10-17

## 2022-10-16 RX ORDER — PANTOPRAZOLE SODIUM 40 MG/1
40 TABLET, DELAYED RELEASE ORAL
Status: DISCONTINUED | OUTPATIENT
Start: 2022-10-16 | End: 2022-10-16

## 2022-10-16 RX ORDER — POTASSIUM CHLORIDE 29.8 MG/ML
20 INJECTION INTRAVENOUS PRN
Status: DISCONTINUED | OUTPATIENT
Start: 2022-10-16 | End: 2022-10-28 | Stop reason: HOSPADM

## 2022-10-16 RX ORDER — FUROSEMIDE 40 MG/1
40 TABLET ORAL DAILY
Status: DISCONTINUED | OUTPATIENT
Start: 2022-10-16 | End: 2022-10-16

## 2022-10-16 RX ORDER — DEXTROSE MONOHYDRATE 100 MG/ML
INJECTION, SOLUTION INTRAVENOUS ONCE
Status: DISCONTINUED | OUTPATIENT
Start: 2022-10-16 | End: 2022-10-16

## 2022-10-16 RX ORDER — ASPIRIN 81 MG/1
81 TABLET, CHEWABLE ORAL DAILY
Status: DISCONTINUED | OUTPATIENT
Start: 2022-10-17 | End: 2022-10-28 | Stop reason: HOSPADM

## 2022-10-16 RX ORDER — ENOXAPARIN SODIUM 100 MG/ML
40 INJECTION SUBCUTANEOUS DAILY
Status: DISCONTINUED | OUTPATIENT
Start: 2022-10-16 | End: 2022-10-16

## 2022-10-16 RX ADMIN — DEXTROSE MONOHYDRATE: 100 INJECTION, SOLUTION INTRAVENOUS at 21:30

## 2022-10-16 RX ADMIN — DOPAMINE HYDROCHLORIDE IN DEXTROSE 12.5 MCG/KG/MIN: 1.6 INJECTION, SOLUTION INTRAVENOUS at 18:03

## 2022-10-16 RX ADMIN — DEXTROSE MONOHYDRATE: 100 INJECTION, SOLUTION INTRAVENOUS at 08:57

## 2022-10-16 RX ADMIN — MIDAZOLAM 2 MG/HR: 5 INJECTION INTRAMUSCULAR; INTRAVENOUS at 16:59

## 2022-10-16 RX ADMIN — SODIUM ZIRCONIUM CYCLOSILICATE 10 G: 5 POWDER, FOR SUSPENSION ORAL at 06:41

## 2022-10-16 RX ADMIN — PANTOPRAZOLE SODIUM 40 MG: 40 INJECTION, POWDER, FOR SOLUTION INTRAVENOUS at 18:43

## 2022-10-16 RX ADMIN — DOPAMINE HYDROCHLORIDE IN DEXTROSE 10 MCG/KG/MIN: 1.6 INJECTION, SOLUTION INTRAVENOUS at 07:39

## 2022-10-16 RX ADMIN — DOCUSATE SODIUM 100 MG: 100 CAPSULE, LIQUID FILLED ORAL at 22:08

## 2022-10-16 RX ADMIN — ATORVASTATIN CALCIUM 20 MG: 10 TABLET, FILM COATED ORAL at 22:07

## 2022-10-16 RX ADMIN — LORAZEPAM 2 MG: 2 INJECTION INTRAMUSCULAR; INTRAVENOUS at 16:17

## 2022-10-16 RX ADMIN — LEVOTHYROXINE SODIUM 125 MCG: 0.12 TABLET ORAL at 05:49

## 2022-10-16 RX ADMIN — SODIUM POLYSTYRENE SULFONATE 15 G: 15 SUSPENSION ORAL; RECTAL at 18:46

## 2022-10-16 RX ADMIN — SODIUM BICARBONATE 50 MEQ: 84 INJECTION, SOLUTION INTRAVENOUS at 06:42

## 2022-10-16 RX ADMIN — CALCIUM GLUCONATE 1000 MG: 20 INJECTION, SOLUTION INTRAVENOUS at 06:37

## 2022-10-16 RX ADMIN — OLANZAPINE 20 MG: 5 TABLET, FILM COATED ORAL at 22:08

## 2022-10-16 RX ADMIN — ENOXAPARIN SODIUM 40 MG: 100 INJECTION SUBCUTANEOUS at 08:34

## 2022-10-16 RX ADMIN — HEPARIN SODIUM 5000 UNITS: 5000 INJECTION INTRAVENOUS; SUBCUTANEOUS at 22:08

## 2022-10-16 RX ADMIN — FENTANYL CITRATE 50 MCG/HR: 0.05 INJECTION, SOLUTION INTRAMUSCULAR; INTRAVENOUS at 17:01

## 2022-10-16 RX ADMIN — DEXTROSE MONOHYDRATE: 100 INJECTION, SOLUTION INTRAVENOUS at 16:55

## 2022-10-16 RX ADMIN — FUROSEMIDE 40 MG: 10 INJECTION, SOLUTION INTRAMUSCULAR; INTRAVENOUS at 06:38

## 2022-10-16 RX ADMIN — PANTOPRAZOLE SODIUM 40 MG: 40 TABLET, DELAYED RELEASE ORAL at 05:49

## 2022-10-16 ASSESSMENT — PAIN SCALES - GENERAL
PAINLEVEL_OUTOF10: 0

## 2022-10-16 ASSESSMENT — PULMONARY FUNCTION TESTS
PIF_VALUE: 17
PIF_VALUE: 16

## 2022-10-16 NOTE — ED PROVIDER NOTES
Emergency Department Provider Note  Location: [address] [place] [address]  ED  10/15/2022     Patient Identification  Mercedes Love is a 78 y.o. male    Chief Complaint  Bradycardia (Air care from [facility])      Mode of [address]    HPI  (History provided by [facility] ED doc and air care)  This is a 78 y.o. male with a PMH significant for A-fib, CAD, CHF  presented today for bradycardia. Patient was seen at [facility] and was going to [facility]. However Cath Lab was tied up with another critical patient so patient was diverted to our ED for ongoing care as he remained persistent bradycardic and was ill-appearing. Air care states that patient was transported without dopamine drip and was able to maintain his blood pressure. He remained awake and was able to answer questions with normal mental status. External pacer pads was on the patient but he was not being paced. Patient arrives here stating he \"felt okay\". He denies pain. ROS  Review of Systems   Unable to perform ROS: Acuity of condition       I have reviewed the following nursing documentation:  Allergies: Allergies   Allergen Reactions    Penicillins        Past medical history:  has a past medical history of Acute kidney injury ([address] Anemia, Arthritis, Atrial fibrillation ([address] Atrial flutter ([address] CAD (coronary artery disease), Cardiomyopathy ([address] Cellulitis of right upper extremity, CHF (congestive heart failure) ([address] CKD (chronic kidney disease), Hypertension, Hypovolemia, PVC's (premature ventricular contractions), Schizophrenia ([address] Seizures ([address] and Urinary tract infection. Past surgical history:  has a past surgical history that includes Appendectomy and TURP. Home medications:   Prior to Admission medications    Medication Sig Start Date End Date Taking? Authorizing Provider   amiodarone (CORDARONE) 200 MG tablet Take 1 tablet by mouth daily.  4/2/15   Mera Maldonado MD   benzonatate (TESSALON) 100 MG capsule Take 100 mg by mouth 3 times daily as needed for Cough. Historical Provider, MD   docusate sodium (COLACE) 100 MG capsule Take 100 mg by mouth 2 times daily. Historical Provider, MD   aspirin 325 MG EC tablet Take 325 mg by mouth daily. Historical Provider, MD   ferrous sulfate 325 (65 FE) MG tablet Take 325 mg by mouth daily (with breakfast). Historical Provider, MD   tamsulosin (FLOMAX) 0.4 MG capsule Take 0.4 mg by mouth daily. Historical Provider, MD   simvastatin (ZOCOR) 20 MG tablet Take 20 mg by mouth nightly. Historical Provider, MD   levothyroxine (SYNTHROID) 25 MCG tablet Take 25 mcg by mouth Daily. Historical Provider, MD   ascorbic acid (VITAMIN C) 500 MG tablet Take 500 mg by mouth daily. Historical Provider, MD   Nutritional Supplements (ARGINAID EXTRA) LIQD by Does not apply route. Historical Provider, MD   potassium chloride SA (K-DUR;KLOR-CON M) 20 MEQ tablet Take 20 mEq by mouth 2 times daily. Historical Provider, MD   furosemide (LASIX) 20 MG tablet Take 20 mg by mouth 2 times daily. Historical Provider, MD   metoprolol (TOPROL-XL) 50 MG XL tablet Take 50 mg by mouth 2 times daily. Historical Provider, MD   Multiple Vitamins-Minerals (THERAPEUTIC MULTIVITAMIN-MINERALS) tablet Take 1 tablet by mouth 2 times daily. Historical Provider, MD   pantoprazole (PROTONIX) 20 MG tablet Take 20 mg by mouth 2 times daily. Historical Provider, MD   OLANZapine (ZYPREXA) 10 MG tablet Take 10 mg by mouth 2 times daily. Historical Provider, MD   divalproex (DEPAKOTE SPRINKLE) 125 MG capsule Take 125 mg by mouth 3 times daily. Historical Provider, MD   LORazepam (ATIVAN) 1 MG tablet Take 1 mg by mouth every 6 hours as needed for Anxiety. Historical Provider, MD   melatonin 3 MG TABS tablet Take 3 mg by mouth as needed.     Historical Provider, MD   diphenhydrAMINE-APAP, sleep, (TYLENOL PM EXTRA STRENGTH)  MG tablet Take 2 tablets by mouth nightly as needed for Sleep. Historical Provider, MD   acetaminophen (TYLENOL) 325 MG tablet Take 650 mg by mouth every 6 hours as needed for Pain. Historical Provider, MD       Social history:  reports that he has quit smoking. His smoking use included cigarettes. He has a 60.00 pack-year smoking history. He does not have any smokeless tobacco history on file. He reports that he does not drink alcohol and does not use drugs. Family history:    Family History   Problem Relation Age of Onset    Arthritis Mother     Heart Disease Father        Exam  ED Triage Vitals   BP Temp Temp Source Heart Rate Resp SpO2 Height Weight   10/15/22 2158 10/15/22 2202 10/15/22 2202 10/15/22 2158 10/15/22 2158 10/15/22 2158 10/15/22 2202 10/15/22 2202   (!) 97/58 (!) 87.4 °F (30.8 °C) Oral (!) 47 18 98 % 5' 8\" (1.727 m) 178 lb 9.2 oz (81 kg)   Physical Exam  Vitals and nursing note reviewed. Constitutional:       Appearance: He is well-developed. He is ill-appearing and toxic-appearing. He is not diaphoretic. HENT:      Head: Normocephalic and atraumatic. Eyes:      General: No scleral icterus. Right eye: No discharge. Left eye: No discharge. Conjunctiva/sclera: Conjunctivae normal.   Neck:      Trachea: No tracheal deviation. Cardiovascular:      Rate and Rhythm: Bradycardia present. Rhythm irregular. Comments: Palpable but weak peripheral pulses  Pulmonary:      Effort: Pulmonary effort is normal. No respiratory distress. Breath sounds: Decreased air movement (Diminished in the bases) present. No stridor. Abdominal:      General: There is no distension. Palpations: Abdomen is soft. Tenderness: There is no abdominal tenderness. Musculoskeletal:         General: No deformity. Cervical back: Neck supple. Skin:     General: Skin is dry. Coloration: Skin is not pale. Comments: No diaphoresis   Neurological:      Mental Status: He is lethargic.       Motor: No abnormal muscle tone or seizure activity. MDM/ED Course  ED Medication Orders (From admission, onward)      Start Ordered     Status Ordering Provider    10/15/22 2215 10/15/22 2207  DOPamine (INTROPIN) 400 mg in dextrose 5 % 250 mL infusion  CONTINUOUS        Question Answer Comment   Titrate Infusion?: Yes    Initial Infusion Dose: 2.5 mcg/kg/min    Goal of Therapy: MAP greater than 65 mmHg    Contact Provider if: Patient is receiving the maximum dose and is not achieving the goal of therapy        Last MAR action: Rate/Dose Change - by Rossana Wheat on 10/15/22 at [address] [facility]            EKG  The Ekg interpreted by me in the absence of a cardiologist shows. Idioventricular rhythm with a rate of 36  Axis is   normal  No specific ST-T wave changes appreciated. No evidence of acute ischemia. No significant change from prior EKG dated earlier today at [facility]  Compared to prior EKG dated December 16, 2014, patient is in bradycardic rhythm today. He was previously sinus rhythm with a heart rate of 82        Radiology  XR CHEST PORTABLE    Result Date: 10/15/2022  EXAMINATION: ONE XRAY VIEW OF THE CHEST 10/15/2022 7:54 pm COMPARISON: None. HISTORY: ORDERING SYSTEM PROVIDED HISTORY: bradycardia TECHNOLOGIST PROVIDED HISTORY: Reason for exam:->bradycardia Reason for Exam: bradycardia FINDINGS: A portable upright frontal view chest radiograph was obtained. The heart is enlarged. The mediastinal contour and pleural spaces are otherwise within normal limits. The pulmonary vascular pattern is increased. There is no focal consolidation or pneumothorax. No acute thoracic osseous abnormality. Moderate pulmonary vascular congestive change. Minimal superimposed right upper lobe atelectasis. Cardiomegaly. No significant pleural effusion.         Labs  Results for orders placed or performed during the hospital encounter of 10/15/22   Comprehensive Metabolic Panel   Result Value Ref Range    Sodium 130 (L) 136 - 145 mmol/L    Potassium 5.4 (H) 3.5 - 5.1 mmol/L    Chloride 95 (L) 99 - 110 mmol/L    CO2 27 21 - 32 mmol/L    Anion Gap 8 3 - 16    Glucose 126 (H) 70 - 99 mg/dL    BUN 27 (H) 7 - 20 mg/dL    Creatinine 1.1 0.8 - 1.3 mg/dL    GFR Non-African American >60 >60    GFR African American >60 >60    Calcium 8.9 8.3 - 10.6 mg/dL    Total Protein 6.5 6.4 - 8.2 g/dL    Albumin 3.4 3.4 - 5.0 g/dL    Albumin/Globulin Ratio 1.1 1.1 - 2.2    Total Bilirubin <0.2 0.0 - 1.0 mg/dL    Alkaline Phosphatase 104 40 - 129 U/L    ALT 46 (H) 10 - 40 U/L    AST 43 (H) 15 - 37 U/L   Magnesium   Result Value Ref Range    Magnesium 2.50 (H) 1.80 - 2.40 mg/dL   Phosphorus   Result Value Ref Range    Phosphorus 3.9 2.5 - 4.9 mg/dL   Troponin   Result Value Ref Range    Troponin <0.01 <0.01 ng/mL   Brain Natriuretic Peptide   Result Value Ref Range    Pro- 0 - 449 pg/mL   CBC with Auto Differential   Result Value Ref Range    WBC 4.4 4.0 - 11.0 K/uL    RBC 3.62 (L) 4.20 - 5.90 M/uL    Hemoglobin 10.5 (L) 13.5 - 17.5 g/dL    Hematocrit 33.2 (L) 40.5 - 52.5 %    MCV 91.6 80.0 - 100.0 fL    MCH 29.1 26.0 - 34.0 pg    MCHC 31.8 31.0 - 36.0 g/dL    RDW 16.4 (H) 12.4 - 15.4 %    Platelets 379 (L) 844 - 450 K/uL    MPV 8.7 5.0 - 10.5 fL    Neutrophils % 78.2 %    Lymphocytes % 9.0 %    Monocytes % 11.1 %    Eosinophils % 1.3 %    Basophils % 0.4 %    Neutrophils Absolute 3.4 1.7 - 7.7 K/uL    Lymphocytes Absolute 0.4 (L) 1.0 - 5.1 K/uL    Monocytes Absolute 0.5 0.0 - 1.3 K/uL    Eosinophils Absolute 0.1 0.0 - 0.6 K/uL    Basophils Absolute 0.0 0.0 - 0.2 K/uL   Sample possible blood bank testing   Result Value Ref Range    Specimen Status ALEYDA    EKG 12 Lead   Result Value Ref Range    Ventricular Rate 36 BPM    Atrial Rate 35 BPM    QRS Duration 156 ms    Q-T Interval 608 ms    QTc Calculation (Bazett) 470 ms    R Axis 2 degrees    T Axis 85 degrees    Diagnosis       Atrial fibrillation with slow ventricular responseNon-specific intra-ventricular conduction blockAbnormal ECGWhen compared with ECG of 15-OCT-2022 20:31,Atrial fibrillation has replaced Idioventricular rhythm         - Patient seen and evaluated in room 1.  78 y.o. male presented for bradycardia. Patient was awake but slow to respond. BP initially 97/58 but shortly after arrival, his HR slowed down further and BP dropped to 77/47 so we started pacing him externally and initiated dopamine drip. Patient was also very hypothermic so he was placed under a warming blanket. [facility] reported that their blood sample was rejected by lab so we redrew labs here. As I was getting the results, Cath Lab arrived to take the patient. Patient has borderline hyperkalemia. Borderline hyponatremia. Trop < 0.01.       - Is this patient to be included in the SEP-1 Core Measure due to severe sepsis or septic shock? No   Exclusion criteria - the patient is NOT to be included for SEP-1 Core Measure due to: Infection is not suspected    Clinical Impression:  1. Bradycardia          Disposition:  Admit to cath lab in critical condition. Blood pressure (!) 77/47, pulse 70, temperature (!) 88 °F (31.1 °C), temperature source Core, resp. rate 17, height 5' 8\" (1.727 m), weight 178 lb 9.2 oz (81 kg), SpO2 98 %. Total critical care time is 35 minutes, which excludes separately billable procedures and updating family. Time spent is specifically for management of the presenting complaint and symptoms initially, direct bedside care, reevaluation, review of records, and consultation. There was a high probability of clinically significant life-threatening deterioration in the patient's condition, which required my urgent intervention. This chart was generated in part by using Dragon Dictation system and may contain errors related to that system including errors in grammar, punctuation, and spelling, as well as words and phrases that may be inappropriate.  If there are any questions or concerns please feel free to contact the dictating provider for clarification.      Karlos Urrutia MD  [id number] Leopoldo Raya MD  10/15/22 1819

## 2022-10-16 NOTE — PROGRESS NOTES
1610: Patient began seizure like activity, desat, facial redness, stiffness. MD called to bedside  1617: 2mg IV Ativan given   1620: ALINA Llamas, notified of plan to intubate. \"Do what needs to be done\"  6514: 2mg IV Versed given  1625: 10mg IV etomidate given  1625: Intubated. #8 @24 with color change  1626: 10mg IV etomidate given  1626: bilateral breath sounds heard  OG placed @55cm. Chest Xray and KUB ordered. Fentanyl and versed started. BP dropped with maps in the mid 50s. Dopamine increased to 10 and then 12.5.      Vitals:    10/16/22 1800   BP: (!) 98/53   Pulse: 79   Resp: 16   Temp:    SpO2: 98%

## 2022-10-16 NOTE — PROGRESS NOTES
Patient increasingly more lethargic- MD at bedside and aware. Verbal order for EKG, Blood cultures, and urinalysis received- placed orders. Will monitor patient.

## 2022-10-16 NOTE — BH NOTE
Patient was sitting in the dayroom in his wheelchair without any complaints and denied pain. Patient mumbled with garbled speech to communicate but according to report that is his baseline. Patient would follow unit routines and was cooperative with nurse requests. Nurse was attempting to get his vital signs but it was challenging due to the machines no able to read his values. Patient was alert and breathing in no distress with respirations regular and even and unlabored. Patient's radial pulse was not easily felt or heard and his extremities were colder. However, patient was sitting upright talking to the nurse. This writer informed Charge Nurse that his vitals were seeming to be abnormal. She called the patient's doctor and received an order for a stat Ekg. When trying to reposition the patient from his wheelchair to the bed for the stat EKG patient was sharing how he wanted to use the restroom and nurse said after the EKG picture of his heart, we would take him to the bathroom and patient verbalized \"okay\". When attempting to stand with the charge nurse and this writer, patient's gaze of eyes protruded upward and seemed to posture with a rigid upper trunk. Charge nurse ordered a code to be called and it was activated. This nurse relieved male sitter to help with the patient due to his ability to have more strength to support the patient. The code team arrived. Patient was alert with a pulse and breathing and he was escorted by the code team to the ER. This nurse went to ER and gave a verbal report to ER staff. Family called the unit and were notified that the patient had been transferred to the ER and to contact the ER for further follow-up.

## 2022-10-16 NOTE — CONSULTS
[address] [place] [address]  [phone]      Attending Physician: Mohinder Avitia MD  Reason for Consultation/Chief Complaint: Complete heart block    Subjective   History of Present Illness:  Arturo Vazquez is a 78 y.o. male with a history of paroxysmal atrial fibrillation, chronic LV systolic heart failure previously attributed to non-ischemic cardiomyopathy, LBBB, essential hypertension, and schizophrenia who initially presented to [facility] ED after he was reportedly found to be unresponsive while an inpatient in his geriatric psych unit. The patient is currently encephalopathic and unable to provide additional history. The patient's sister, who is his POA, was present at bedside and assists with the history. The patient sister reports that he was recently an inpatient at a behavioral [address] He was then transferred to the [facility] geriatric psych unit to be closer to family. He has a longstanding history of chronic LV systolic heart failure, and per his sister, had previously refused to have an ICD placed. He was previously on digoxin, but his sister says that this was discontinued several years ago. He is now taking metoprolol succinate 100 mg daily. He is now currently taking Zyprexa and Seroquel. She says that he was previously on lithium, but that this was discontinued several years ago. On arrival to [facility] ED, the patient was found to be hypotensive and in complete heart block. He was started on an IV dopamine infusion and then transferred to [facility] for emergent transvenous pacemaker placement.       Past Medical History:   has a past medical history of Acute kidney injury ([address] Anemia, Arthritis, Atrial fibrillation ([address] Atrial flutter ([address] CAD (coronary artery disease), Cardiomyopathy ([address] Cellulitis of right upper extremity, CHF (congestive heart failure) ([address] CKD (chronic kidney disease), Hypertension, Hypovolemia, PVC's (premature ventricular contractions), Schizophrenia ([facility] Seizures ([facility] and Urinary tract infection. Surgical History:   has a past surgical history that includes Appendectomy and TURP. Social History:   reports that he has quit smoking. His smoking use included cigarettes. He has a 60.00 pack-year smoking history. He does not have any smokeless tobacco history on file. He reports that he does not drink alcohol and does not use drugs. Family History:  family history includes Arthritis in his mother; Heart Disease in his father. Home Medications:  Were reviewed and are listed in nursing record and/or below  Prior to Admission medications    Medication Sig Start Date End Date Taking? Authorizing Provider   amiodarone (CORDARONE) 200 MG tablet Take 1 tablet by mouth daily. 4/2/15   Lion Negro MD   benzonatate (TESSALON) 100 MG capsule Take 100 mg by mouth 3 times daily as needed for Cough. Historical Provider, MD   docusate sodium (COLACE) 100 MG capsule Take 100 mg by mouth 2 times daily. Historical Provider, MD   aspirin 325 MG EC tablet Take 325 mg by mouth daily. Historical Provider, MD   ferrous sulfate 325 (65 FE) MG tablet Take 325 mg by mouth daily (with breakfast). Historical Provider, MD   tamsulosin (FLOMAX) 0.4 MG capsule Take 0.4 mg by mouth daily. Historical Provider, MD   simvastatin (ZOCOR) 20 MG tablet Take 20 mg by mouth nightly. Historical Provider, MD   levothyroxine (SYNTHROID) 25 MCG tablet Take 25 mcg by mouth Daily. Historical Provider, MD   ascorbic acid (VITAMIN C) 500 MG tablet Take 500 mg by mouth daily. Historical Provider, MD   Nutritional Supplements (ARGINAID EXTRA) LIQD by Does not apply route. Historical Provider, MD   potassium chloride SA (K-DUR;KLOR-CON M) 20 MEQ tablet Take 20 mEq by mouth 2 times daily. Historical Provider, MD   furosemide (LASIX) 20 MG tablet Take 20 mg by mouth 2 times daily.     Historical Provider, MD metoprolol (TOPROL-XL) 50 MG XL tablet Take 50 mg by mouth 2 times daily. Historical Provider, MD   Multiple Vitamins-Minerals (THERAPEUTIC MULTIVITAMIN-MINERALS) tablet Take 1 tablet by mouth 2 times daily. Historical Provider, MD   pantoprazole (PROTONIX) 20 MG tablet Take 20 mg by mouth 2 times daily. Historical Provider, MD   OLANZapine (ZYPREXA) 10 MG tablet Take 10 mg by mouth 2 times daily. Historical Provider, MD   divalproex (DEPAKOTE SPRINKLE) 125 MG capsule Take 125 mg by mouth 3 times daily. Historical Provider, MD   LORazepam (ATIVAN) 1 MG tablet Take 1 mg by mouth every 6 hours as needed for Anxiety. Historical Provider, MD   melatonin 3 MG TABS tablet Take 3 mg by mouth as needed. Historical Provider, MD   diphenhydrAMINE-APAP, sleep, (TYLENOL PM EXTRA STRENGTH)  MG tablet Take 2 tablets by mouth nightly as needed for Sleep. Historical Provider, MD   acetaminophen (TYLENOL) 325 MG tablet Take 650 mg by mouth every 6 hours as needed for Pain. Historical Provider, MD        CURRENT Medications:  DOPamine (INTROPIN) 400 mg in dextrose 5 % 250 mL infusion, Continuous        Allergies:  Penicillins     Review of Systems:   A 14 point review of symptoms was completed. Pertinent positives were identified in the HPI. All other review of symptoms negative unless otherwise noted below. Objective   PHYSICAL EXAM:    Vitals:    10/15/22 2214   BP: 97/58   Pulse: 47   Resp: 18   Temp: (!) 88 °F (31.1 °C)   SpO2: 98% on room air    Weight: 178 lb 9.2 oz (81 kg)       General: Encephalopathic adult male. HEENT: Normocephalic, atraumatic, non-icteric, hearing intact, nares normal, mucous membranes moist.  Neck: Supple, trachea midline. No adenopathy. No thyromegaly. No JVD. Heart: Bradycardic with irregular rhythm. Normal S1 and S2. No murmurs, gallops or rubs. Lungs: Normal respiratory effort. Clear to auscultation bilaterally. No wheezes, rales, or rhonchi.   Abdomen: Soft, non-tender. Normoactive bowel sounds. No masses or organomegaly. Skin: No rashes, wounds, or lesions. Pulses: 2+ and symmetric. Extremities: No clubbing, cyanosis, or edema. Neuro: Alert, but encephalopathic. Labs   CBC:   Lab Results   Component Value Date/Time    WBC 4.4 10/15/2022 10:00 PM    RBC 3.62 10/15/2022 10:00 PM    HGB 10.5 10/15/2022 10:00 PM    HCT 33.2 10/15/2022 10:00 PM    MCV 91.6 10/15/2022 10:00 PM    RDW 16.4 10/15/2022 10:00 PM     10/15/2022 10:00 PM     CMP:  Lab Results   Component Value Date/Time     10/15/2022 10:00 PM    K 5.4 10/15/2022 10:00 PM    K 4.3 10/13/2022 06:44 AM    CL 95 10/15/2022 10:00 PM    CO2 27 10/15/2022 10:00 PM    BUN 27 10/15/2022 10:00 PM    CREATININE 1.1 10/15/2022 10:00 PM    GFRAA >60 10/15/2022 10:00 PM    AGRATIO 1.1 10/15/2022 10:00 PM    LABGLOM >60 10/15/2022 10:00 PM    GLUCOSE 126 10/15/2022 10:00 PM    PROT 6.5 10/15/2022 10:00 PM    CALCIUM 8.9 10/15/2022 10:00 PM    BILITOT <0.2 10/15/2022 10:00 PM    ALKPHOS 104 10/15/2022 10:00 PM    AST 43 10/15/2022 10:00 PM    ALT 46 10/15/2022 10:00 PM     PT/INR:  No results found for: PTINR  HgBA1c:  Lab Results   Component Value Date    LABA1C 5.9 10/11/2022     Lab Results   Component Value Date    TROPONINI <0.01 10/15/2022         Cardiac Data     Last EKG: Complete heart block with ventricular escape rhythm    Echo:  TTE 7/5/22:  Conclusions     * Left ventricular chamber dimension is normal.     * Left ventricular function is mildly reduced with an estimated ejection   fraction of 40-45%. * Left ventricular segmental wall motion is abnormal.     * Right ventricular systolic function is normal.     * Unable to estimate pulmonary arterial systolic pressure due to lack of   tricuspid regurgitation jet. * There is mild aortic valve regurgitation. * The aortic arch is mildly dilated.      * The basal inferior wall, mid inferior wall, basal anterolateral wall, mid anterolateral wall, basal inferolateral wall, and mid inferolateral wall are   hypokinetic. * All other walls appear normal.    Stress Test:  Pharmacologic Nuclear SPECT Stress    Cath: N/A    Other Studies:   Chest x-ray 10/15/22: Moderate pulmonary vascular congestive change. Minimal superimposed right   upper lobe atelectasis. Cardiomegaly. No significant pleural effusion. Assessment:      1. Complete heart block  2. Hypotension  3. Chronic LV systolic heart failure (LVEF 40-45% on most recent TTE)  4. Cardiomyopathy previously attributed to non-ischemic etiology (No ischemia seen on nuclear SPECT stress test from 7/2022)  5. LBBB  6. Paroxysmal atrial fibrillation (not currently on anticoagulation)  7. Schizophrenia  8. Hypothermia      Plan:     1. Proceed to cardiac cath lab for emergent transvenous pacemaker insertion. 2. Consent for procedure was obtained from patient's sister, who is his POA. 3. Avoid negative chrontropic and AV megan blocking agents. Critical Care   Due to the high probability of clinically significant life threating deterioration of the patient's condition that required my urgent intervention, a total critical care time of >35 minutes was used. This time excludes any time that may have been spent performing procedures. This includes, but is not limited to, vital sign monitoring, telemetry monitoring, continuous pulse oximety, IV medication, clinical response to the IV medications, documentation time, consultation time, interpretation of lab data, review of nursing notes and old record review. Thank you for allowing us to participate in the care of [facility]. Please call me with any questions [phone]. Ivan Cotton,   [place]   [phone] [facility]  [phone] [address] [place] [address]  10/15/2022 10:26 PM      I will address the patient's cardiac risk factors and adjusted pharmacologic treatment as needed.  In addition, I have reinforced the need for patient directed risk factor modification. All questions and concerns were addressed to the patient/family. Alternatives to my treatment were discussed. The note was completed using EMR. Every effort was made to ensure accuracy; however, inadvertent computerized transcription errors may be present.

## 2022-10-16 NOTE — PLAN OF CARE
Problem: Chronic Conditions and Co-morbidities  Goal: Patient's chronic conditions and co-morbidity symptoms are monitored and maintained or improved  Outcome: Progressing   Monitor and assess patient's chronic conditions and comorbid symptoms for stability, deterioration, or improvement   Update acute care plan with appropriate goals if chronic or comorbid symptoms are exacerbated and prevent overall improvement and discharge   Collaborate with multidisciplinary team to address chronic and comorbid conditions and prevent exacerbation or deterioration     Problem: Safety - Medical Restraint  Goal: Remains free of injury from restraints (Restraint for Interference with Medical Device)  Outcome: Progressing     Problem: Pain  Goal: Verbalizes/displays adequate comfort level or baseline comfort level  Outcome: Progressing    Problem: Neurosensory - Adult  Goal: Achieves stable or improved neurological status  Outcome: Progressing  Goal: Absence of seizures  Outcome: Progressing  Goal: Remains free of injury related to seizures activity  Outcome: Progressing  Goal: Achieves maximal functionality and self care  Outcome: Progressing     Problem: Respiratory - Adult  Goal: Achieves optimal ventilation and oxygenation  Outcome: Progressing     Problem: Cardiovascular - Adult  Goal: Maintains optimal cardiac output and hemodynamic stability  Outcome: Progressing     Problem: Skin/Tissue Integrity - Adult  Goal: Skin integrity remains intact  Outcome: Progressing  Goal: Incisions, wounds, or drain sites healing without S/S of infection  Outcome: Progressing  Goal: Oral mucous membranes remain intact  Outcome: Progressing     Problem: Musculoskeletal - Adult  Goal: Return mobility to safest level of function  Outcome: Progressing  Goal: Maintain proper alignment of affected body part  Outcome: Progressing  Goal: Return ADL status to a safe level of function  Outcome: Progressing     Problem: Genitourinary - Adult  Goal: Absence of urinary retention  Outcome: Progressing  Goal: Urinary catheter remains patent  Outcome: Progressing     Problem: Hematologic - Adult  Goal: Maintains hematologic stability  Outcome: Progressing     Problem: Discharge Planning  Goal: Discharge to home or other facility with appropriate resources  Outcome: Progressing   Identify discharge learning needs (meds, wound care, etc)   Identify barriers to discharge with patient and caregiver

## 2022-10-16 NOTE — PROGRESS NOTES
10/16/22 1638   Patient Observation   Heart Rate 74   Resp 14   SpO2 99 %   Observations ALLISON Burks@[employer]   Vent Information   Ventilator -01   Ventilator Initiate Yes   Vent Mode AC/PRVC   $Ventilation $Initial Day   Ventilator Settings   FiO2  50 %   Resp Rate (Set) 15 bmp   PEEP/CPAP (cmH2O) 5   Vent Patient Data (Readings)   Vt (Measured) 500 mL   Peak Inspiratory Pressure (cmH2O) 16 cmH2O   Rate Measured 15 br/min   Minute Volume (L/min) 8.3 Liters   I:E Ratio 1:2.5   Flow Sensitivity 3 L/min   Vent Alarm Settings   High Pressure (cmH2O) 40 cmH2O   Low Minute Volume (lpm) 2.2 L/min   High Minute Volume (lpm) 20 L/min   Low Exhaled Vt (ml) 200 mL   High Exhaled Vt (ml) 1000 mL   RR High (bpm) 40 br/min   Apnea (secs) 20 secs   Additional Respiratoray Assessments   Humidification Source HME   Ambu Bag With Mask At Bedside Yes

## 2022-10-16 NOTE — SIGNIFICANT EVENT
[employer] called overhead to [facility].  - Pt has been lashawn throughout the day on GS per his RN. +His cardiac meds including metop were held today. +Per the [address] [place] [address] RN patient does have dementia but was able to answer basic questions and interact with her prior to this event. He does tend to mumble a lot in an unintelligible voice at baseline.  - They were taking VS on pt. nurse noted the patient was breathing abnormally. She was trying to obtain vitals at the time of this described event. Dynamap read rate of 150's (may have been artifact bc saw baseline artifact vs ectopy when initially hooked up to EKG. However, actual vent rate was clearly in mid to low 20's w/ grossly regular rate. Appeared sinus.  - Patient's eyes then rolled back in his head and he became unresponsive briefly (less than 1 min). - Since the event he is awake and alert but does not follow commands. Only interaction I can get him to perform was when I placed my face in front of his he would track me with his eyes as I would move my head. He does retract all 4 to noxious stimuli and appears to be moving all 4. No visibly obvious cranial nerve deficit was appreciated. -EKG from today x2 (first on GS unit and 2nd at arrival to ED) as well as last previous below all reviewed by me. - BP was was nml. RR mild tachypnea at times. He was noted to be hypothermic w/ temp of 86.7 in ED.    - Decision made to emergently transfer pt down to ER for probable symptomatic severe bradycardia. - I accompanied pt to ER and personally gave verbal handoff to Dr. Ramona Blank. I remained at bedside for initial evaluation with Dr. Ramona Blank. - Pt was DC from [address] [place] [address].     EKG 12 Lead  Order: [id number]  Status: Preliminary result    Visible to patient: No (not released)    Next appt: None    0 Result Notes  Component Ref Range & Units 10/15/22 2031 10/15/22 2014   Ventricular Rate BPM 26 P  28 P    Atrial Rate BPM 18 P  30 P    QRS Duration ms 154 P  158 P    Q-T Interval ms 662 P  678 P    QTc Calculation (Bazett) ms 434 P  462 P    R Axis degrees 185 P  -8 P    T Axis degrees 159 P  -37 P    Diagnosis   Suspect arm lead reversal, interpretation assumes no reversalIdioventricular rhythmNon-specific intra-ventricular conduction blockLateral infarct , age undeterminedAbnormal ECGWhen compared with ECG of 15-OCT-2022 20:14, (unconfirmed)No significant change was found  P       Idioventricular rhythmLeft bundle branch blockAbnormal ECGWhen compared with ECG of 11-OCT-2022 19:29,Idioventricular rhythm has replaced Sinus rhythmVent. rate has decreased BY  25 BPM       Previous  EKG 12 lead  Order: [id number]  Status: Final result    Visible to patient: No (not released)    Next appt: None    0 Result Notes  Component Ref Range & Units 10/11/22 1929    Ventricular Rate BPM 53    Atrial Rate BPM 53    QRS Duration ms 148    Q-T Interval ms 524    QTc Calculation (Bazett) ms 491    R Axis degrees 3    T Axis degrees 83    Diagnosis  Normal sinus rhythm with 1st degree A-V blockLow voltageLeft bundle branch blockAbnormal ECGNo previous ECGs availableConfirmed by ROSALIO YODER MD ([id number]) on 10/12/2022 7:49:37 AM      EKGs show worsening bradycardia and widened QRS. He has known left bundle branch block which was present previously. Bradycardia is now severe with a rate in the mid to low 20s predominating. Concern for complete HB, however, at times it appears QRS complexes are preceded by P wave w/ several non conducted P waves on monitor. At other times p waves are not discernable. Total critical care time caring for this patient with life threatening, unstable organ failure, including direct patient contact, management of life support systems, review of data including imaging and labs, discussions with other team members and physicians is 33 minutes so far today, excluding procedures.

## 2022-10-16 NOTE — PROGRESS NOTES
Wasted 3mL of Midazolam with Eleazar Flor. Med disposed of into the Rx Destroyer per protocol.     Primary Nurse eSignature: Electronically signed by Alma Peterson RN on 10/16/22 at 6:26 PM EDT    **SHARE this note so that the co-signing nurse is able to place an eSignature**    Co-signer eSignature: Electronically signed by Carmine San RN on 10/16/22 at 6:34 PM EDT

## 2022-10-16 NOTE — PROCEDURES
ENDOTRACHEAL INTUBATION    INDICATION: Life threatening respiratory failure    TIME OUT: taken    SEDATION: etomidate and versed    PROCEDURE: Using video laryngoscopy, the vocal cords were well visualized and a 8 mm endotracheal tube was place directly through the cords. Good breath sounds auscultated bilaterally without sounds over abdomen. Good return of CO2 on the monitor. CXR is pending.      EBL-0      Elian Spicer MD

## 2022-10-16 NOTE — CONSULTS
INPATIENT PULMONARY CRITICAL CARE CONSULT NOTE      Chief Complaint/Referring Provider:  Patient is being seen at the request of Dr. Mart Schwab  for a consultation for hypothermia, hypotensive and bradycardic status post urgent temporary transvenous pacemaker placement     Presenting HPI: Patient was transferred to the ER after cardiac arrest     Arturo Vazquez is a 78 y.o. male. Patient has a h/o schizoaffective disorder. He lives in [place], [place]. He receives most of his medical care through the [facility] in [place]. He was initially admitted to [facility]  in [place], [place] for hallucinations and impulsive behavior. He was there for ten days then transferred to the [facility] geriatric inpatient psychiatry unit on 10/11. Around 9pm on 10/15 a code blue was called because the patient briefly went unresponsive. He had been bradycardic all day  He had been taking metoprolol chronically but this had been held throughout the day because of his bradycardia. His nurse was trying to check his vitals again as it appeared he was breathing abnormally. While vital signs were being checked the patient lost consciousness briefly (less than a minute). He awoke but was markedly bradycardic. He was sent to the ER where he was found to be hypothermic, hypotensive, and bradycardic to the 20s in complete heart block. He was transferred to [facility] for urgent transvenous pacing lead placement. Patient had a h/o chronic systolic HF, thought to be nonischemic, and paroxysmal afib. The patient required sedation during temporary pacing lead placement and then became agitated thereafter. He required restraints and was given IM geodon, so was not meaningfully interactive or able to provide any history. According to Dr. Alexis Sweeney notes the patient had already been difficult to understand at baseline.     Patient underwent temporary transvenous pacemaker insertion last night  Patient when seen this morning continues to be confused, patient has completely paced rhythm, patient continues to be on dopamine infusion, patient does not have any hypothermia at this time, patient on 3 L of nasal cannula oxygen with saturation of is 96%, patient was also hypoglycemic this morning when evaluated, patient has not had any significant urine output overnight, patient has been n.p.o. because of his clinical status, no other pertinent review of system could be obtained because of patient's clinical status which is precarious and critical       Patient Active Problem List    Diagnosis Date Noted    Complete heart block ([address] 10/16/2022    Hypotension 26/15/7838    Systolic heart failure ([address] 10/16/2022    LBBB (left bundle branch block) 10/16/2022    Non-ischemic cardiomyopathy ([address] 10/16/2022    Symptomatic bradycardia 10/16/2022    Hyperkalemia 10/16/2022    AUSTIN (acute kidney injury) ([address] 10/16/2022    Hypoglycemia 10/16/2022    Hypothermia 10/16/2022    Hyponatremia 10/16/2022    Pulmonary infiltrates 10/16/2022    Shock circulatory ([address] 10/16/2022    Normocytic hypochromic anemia 10/16/2022    Observed seizure-like activity ([address] 10/16/2022    Acute respiratory failure with hypoxia ([address] 10/16/2022    Dementia ([address] 10/15/2022    Hypercholesteremia 10/12/2022    New onset seizure ([address] 10/12/2022    Other specified hypothyroidism 10/12/2022    Paroxysmal atrial fibrillation ([address] 10/12/2022    Benign prostatic hyperplasia, presence of lower urinary tract symptoms unspecified 10/12/2022    Schizoaffective disorder ([address] 10/11/2022    Long term current use of amiodarone 12/16/2014    Atrial flutter ([address]     Cardiomyopathy ([address]     Schizophrenia ([address]     CKD (chronic kidney disease)     CHF (congestive heart failure) ([address]     PVC's (premature ventricular contractions)        Past Medical History:   Diagnosis Date    Acute kidney injury ([address]     Anemia     Hx of     Arthritis     Atrial fibrillation ([facility] Atrial flutter ([facility]     converted to NSR, Poor candidate for anticoagulation. CAD (coronary artery disease)     Cardiomyopathy ([address]     ? Nonishcemic, Echo 10/2014 LVEF 25-30%. Cellulitis of right upper extremity     CHF (congestive heart failure) (HCC)     systolic    CKD (chronic kidney disease)     Cr 2.3 10/2014, not on ACE/ARB due to CKD    Hypertension     Hypovolemia     PVC's (premature ventricular contractions)     secondary to hypokalemia    Schizophrenia (HCC)     hx of    Seizures ([facility])     Urinary tract infection         Past Surgical History:   Procedure Laterality Date    APPENDECTOMY      TURP          Family History   Problem Relation Age of Onset    Arthritis Mother     Heart Disease Father         Social History     Tobacco Use    Smoking status: Former     Packs/day: 3.00     Years: 20.00     Pack years: 60.00     Types: Cigarettes    Smokeless tobacco: Not on file   Substance Use Topics    Alcohol use: No        Allergies   Allergen Reactions    Penicillins                Physical Exam:  Blood pressure 109/60, pulse 74, temperature 99 °F (37.2 °C), resp. rate 14, height 5' 8\" (1.727 m), weight 202 lb 13.2 oz (92 kg), SpO2 99 %.'     Constitutional:  No acute distress. HENT:  Oropharynx is clear and moist. No thyromegaly. Eyes:  Conjunctivae are normal. Pupils equal, round, and reactive to light. No scleral icterus. Neck: . No tracheal deviation present. No obvious thyroid mass. Cardiovascular: Paced rhythm normal heart sounds. No right ventricular heave. No lower extremity edema. Pulmonary/Chest: No wheezes. Rales. Chest wall is not dull to percussion. No accessory muscle usage or stridor. Abdominal: Soft. Bowel sounds present. No distension or hernia. No tenderness. Musculoskeletal: No cyanosis. No clubbing. No obvious joint deformity. Lymphadenopathy: No cervical or supraclavicular adenopathy. Skin: Skin is warm and dry.  No rash or nodules on the exposed extremities. No  Neurologic: Not oriented and communicative when seen this morning        Results:  CBC:   Recent Labs     10/15/22  2200 10/16/22  0403   WBC 4.4 6.3   HGB 10.5* 11.3*   HCT 33.2* 34.4*   MCV 91.6 90.2   * 143     BMP:   Recent Labs     10/15/22  2200 10/16/22  0404 10/16/22  1200   * 131* 134*   K 5.4* 6.2* 5.6*   CL 95* 95* 96*   CO2 27 27 27   PHOS 3.9 4.4  --    BUN 27* 30* 34*   CREATININE 1.1 1.2 1.4*     LIVER PROFILE:   Recent Labs     10/15/22  2040 10/15/22  2200 10/16/22  0404   AST 95* 43* 43*   ALT 50* 46* 50*   BILITOT <0.2 <0.2 0.3   ALKPHOS 109 104 115     PT/INR:   Recent Labs     10/16/22  0403   PROTIME 13.5   INR 1.04         Imaging:  I have reviewed radiology images personally. XR CHEST PORTABLE    (Results Pending)   XR ABDOMEN (KUB) (SINGLE AP VIEW)    (Results Pending)     XR HAND RIGHT (2 VIEWS)    Result Date: 10/12/2022  EXAMINATION: 3 XRAY VIEWS OF THE RIGHT HAND 10/12/2022 3:08 pm COMPARISON: None HISTORY: ORDERING SYSTEM PROVIDED HISTORY: 2nd digit pain/edema TECHNOLOGIST PROVIDED HISTORY: Reason for exam:->2nd digit pain/edema Reason for Exam: 2nd digit pain, edema FINDINGS: There is severe joint space narrowing throughout the digits with prominent osteophytes and mild deformity of the digits. No acute fracture or dislocation is seen. There is a well corticated bony fragment along the styloid process of the ulna. The carpal bones are intact. There is moderate narrowing along the base of the thumb. The bones are osteopenic. No erosions are seen and the soft tissues are unremarkable. There is mild soft tissue swelling throughout the digits. 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 No acute bony abnormality is seen. Old unfused fracture along the styloid process of the ulna.      XR CHEST PORTABLE    Result Date: 10/15/2022  EXAMINATION: ONE XRAY VIEW OF THE CHEST 10/15/2022 7:54 pm COMPARISON: None. HISTORY: ORDERING SYSTEM PROVIDED HISTORY: bradycardia TECHNOLOGIST PROVIDED HISTORY: Reason for exam:->bradycardia Reason for Exam: bradycardia FINDINGS: A portable upright frontal view chest radiograph was obtained. The heart is enlarged. The mediastinal contour and pleural spaces are otherwise within normal limits. The pulmonary vascular pattern is increased. There is no focal consolidation or pneumothorax. No acute thoracic osseous abnormality. Moderate pulmonary vascular congestive change. Minimal superimposed right upper lobe atelectasis. Cardiomegaly. No significant pleural effusion. Echocardiogram:July 2022-Left ventricular chamber dimension is normal.     * Left ventricular function is mildly reduced with an estimated ejection   fraction of 40-45%. * Left ventricular segmental wall motion is abnormal.     * Right ventricular systolic function is normal.     * Unable to estimate pulmonary arterial systolic pressure due to lack of   tricuspid regurgitation jet. * There is mild aortic valve regurgitation. * The aortic arch is mildly dilated. * The basal inferior wall, mid inferior wall, basal anterolateral wall, mid   anterolateral wall, basal inferolateral wall, and mid inferolateral wall are   hypokinetic.      * All other walls appear normal.       Latest Reference Range & Units 10/15/22 20:40 10/15/22 22:00   Troponin <0.01 ng/mL <0.01 <0.01       Assessment:  Principal Problem:    Symptomatic bradycardia  Active Problems:    Schizoaffective disorder (HCC)    Paroxysmal atrial fibrillation (HCC)    Complete heart block (HCC)    Hypotension    Systolic heart failure (HCC)    Hyperkalemia    AUSTIN (acute kidney injury) ([address]    Hypoglycemia    Hypothermia    Hyponatremia    Pulmonary infiltrates    Shock circulatory (HCC)    Normocytic hypochromic anemia    Observed seizure-like activity (HCC)    Acute respiratory failure with hypoxia ([address]    Cardiomyopathy [facility])  Resolved Problems:    * No resolved hospital problems.  *          Plan:   Oxygen Supplementation to keep saturation between 90 and 94% only  Please titrate the oxygen as per the above parameters  Patient was started on a dextrose 10 infusion because of paucity of D50 W  Pulmonary toilet  Status post temporary transvenous pacemaker  Monitor cardiac rhythm and hemodynamics closely  IV dopamine to continue both for heart rate and also to maintain hemodynamics more than 65 mmHg  Patient is temperature to be trended and patient has hypothermia patient may require Miles hugger  Patient also has been given some calcium gluconate along with that patient got interventions for hyperkalemia  Patient's potassium to be trended  Patient's potassium still on the higher side and Kayexalate ordered for the patient  Patient is not very responsive and will not be able to eat by mouth for that reason NG tube ordered  Enteral feeds as per metabolic support  Random cortisol level have been ordered to see if patient has any secondary adrenal insufficiency which requires steroid supplementation  Patient recent echocardiogram from care everywhere was reviewed  Patient also is having AUSTIN like picture which needs to be trended  Monitor I/O  and BMP  Correct electrolytes on whenever necessary basis  Cardiology follow-up  Monitor for any worsening hypoglycemia  PUD and DVT prophylaxis    Patient's clinical status remains precarious and critical with potential for further decompensation and needs to monitor closely in the ICU      Case discussed with ICU team        Electronically signed by:  Gatito Nevarez MD    10/16/2022    4:44 PM.          Addendum -patient had seizure activity which was witnessed, patient was having profound shortness of breath and respite distress with hypoxemia, patient was not able to protect his airways and for that reason patient had to be intubated and put on mechanical vent to support  Patient was given Ativan initially  Patient got etomidate and Versed for intubation  Initial ventilator settings given to the RT  Titration of ventilator as per repeat ABG  IV sedation to maintain patient ventilator synchrony  Titration of sedation as per RASS scores  Patient may require neurology evaluation-IM to decide     Case discussed with ICU team      Critical care time spent on the patient was 45 minutes exclusive of any procedures      Elian Spicer MD

## 2022-10-16 NOTE — H&P
Hospital Medicine History & Physical      Patient: Kishan Mejía  :  1945  MRN:  [id number]    Date of Service: 10/16/22    Chief Complaint   Patient presents with    Cardiac Arrest     Air care from [facility]:    Kishan Mejía is a 78 y.o. male. Patient has a h/o schizoaffective disorder. He lives in [place], [place]. He receives most of his medical care through the [facility] in [place]. He was initially admitted to [facility]  in [place], [place] for hallucinations and impulsive behavior. He was there for ten days then transferred to the [facility] geriatric inpatient psychiatry unit on 10/11. Around 9pm on 10/15 a code blue was called because the patient briefly went unresponsive. He had been bradycardic all day  He had been taking metoprolol chronically but this had been held throughout the day because of his bradycardia. His nurse was trying to check his vitals again as it appeared he was breathing abnormally. While vital signs were being checked the patient lost consciousness briefly (less than a minute). He awoke but was markedly bradycardic. He was sent to the ER where he was found to be hypothermic, hypotensive, and bradycardic to the 20s in complete heart block. He was transferred to [facility] for urgent transvenous pacing lead placement. Patient had a h/o chronic systolic HF, thought to be nonischemic, and paroxysmal afib. The patient required sedation during temporary pacing lead placement and then became agitated thereafter. He required restraints and was given IM geodon, so was not meaningfully interactive or able to provide any history. According to Dr. Kaylin Aguilar notes the patient had already been difficult to understand at baseline. Review of Systems:  All pertinent positives and negatives are as noted in the HPI section. All other systems were reviewed and are negative.     Past Medical History: Diagnosis Date    Acute kidney injury ([facility]     Anemia     Hx of     Arthritis     [facility]     Atrial flutter ([facility]     converted to NSR, Poor candidate for anticoagulation. CAD (coronary artery disease)     Cardiomyopathy ([facility]     ? Nonishcemic, Echo 10/2014 LVEF 25-30%. Cellulitis of right upper extremity     CHF (congestive heart failure) ([facility])     systolic    CKD (chronic kidney disease)     Cr 2.3 10/2014, not on ACE/ARB due to CKD    Hypertension     Hypovolemia     PVC's (premature ventricular contractions)     secondary to hypokalemia    Schizophrenia (HCC)     hx of    Seizures ([facility])     Urinary tract infection        Past Surgical History:   Procedure Laterality Date    APPENDECTOMY      TURP       Outpatient Medications (per [facility] list)  Olanzapine 20mg nightly    Trazodone 50mg nightly  Tamsulosin 0.4mg nightly  Simvastatin 40mg nightly  Pantoprazole 40mg BID  Naproxen 500mg BID  Toprol XL 100mg nightly  Lisinopril 10mg daily  Levothyroxine 125 mcg daily  Furosemide 40mg daily    Allergies:   Penicillins    Social:   reports that he has quit smoking. His smoking use included cigarettes. He has a 60.00 pack-year smoking history. He does not have any smokeless tobacco history on file. reports no history of alcohol use. Social History     Substance and Sexual Activity   Drug Use No       Family History   Problem Relation Age of Onset    Arthritis Mother     Heart Disease Father        PHYSICAL EXAM:  I performed this physical examination.     Vitals:  Patient Vitals for the past 24 hrs:   BP Temp Temp src Pulse Resp SpO2 Height Weight   10/15/22 2234 -- (!) 88 °F (31.1 °C) CORE -- -- -- -- --   10/15/22 2228 (!) 77/47 -- -- 70 17 98 % -- --   10/15/22 2214 -- (!) 88 °F (31.1 °C) CORE -- -- -- -- --   10/15/22 2213 (!) 94/52 -- -- 75 26 100 % -- --   10/15/22 2211 -- -- -- (!) 35 17 100 % -- --   10/15/22 2205 -- -- -- (!) 39 -- -- -- --   10/15/22 2202 (!) 97/58 (!) 87.4 °F (30.8 °C) Oral -- 18 97 % 5' 8\" (1.727 m) 178 lb 9.2 oz (81 kg)   10/15/22 2158 (!) 97/58 -- -- (!) 47 18 98 % -- --     Room air  Patient is covered in a na hugger and three layers of blankets. Temp sensing glynn reading 91.1 F.    GEN:  Appearance:  age appropriate male in NAD . Level of Consciousness:  Asleep. Arousable to his name spoken loudly. Orientation:  Does not answer. Looks around and moans. HEENT: Sclera anicteric.  no conjunctival chemosis. moist mucus membranes. no specific or diagnostic oral lesions. NECK:  no signs of meningismus. Jugular veins not distended. Carotid pulses  2+.  no cervical lymphadenopathy. no thyromegaly. Right IJ introducer and pacing lead    CV:  regular rhythm. normal S1 & S2.    2/6 blowing systolic murmur over LLSB. no rub.  no gallop. PULM:  Chest excursion is symmetric. Breath sounds are generally vesicular. Adventitious sounds:  none    AB:  Abdominal shape is normal.  Bowel sounds are active. Generally soft to palpation. no tenderness is present. no involuntary guarding. no rebound guarding. RECTAL:   :  Glynn with clear urine    EXTR:  Skin is warm. Capillary refill brisk. no specific or pathognomic rash. no clubbing. no pitting edema. no active wound or ulcer.   Pulses 2+ x 4    LABS:  Lab Results   Component Value Date    WBC 4.4 10/15/2022    HGB 10.5 (L) 10/15/2022    HCT 33.2 (L) 10/15/2022    MCV 91.6 10/15/2022     (L) 10/15/2022     Lab Results   Component Value Date    CREATININE 1.1 10/15/2022    BUN 27 (H) 10/15/2022     (L) 10/15/2022    K 5.4 (H) 10/15/2022    CL 95 (L) 10/15/2022    CO2 27 10/15/2022     Lab Results   Component Value Date    ALT 46 (H) 10/15/2022    AST 43 (H) 10/15/2022    ALKPHOS 104 10/15/2022    BILITOT <0.2 10/15/2022     Lab Results   Component Value Date    TROPONINI <0.01 10/15/2022     No results for input(s): PHART, DVH2ZBA, PO2ART in the last 72 hours.    IMAGING:  XR HAND RIGHT (2 VIEWS)    Result Date: 10/12/2022  EXAMINATION: 3 XRAY VIEWS OF THE RIGHT HAND 10/12/2022 3:08 pm COMPARISON: None HISTORY: ORDERING SYSTEM PROVIDED HISTORY: 2nd digit pain/edema TECHNOLOGIST PROVIDED HISTORY: Reason for exam:->2nd digit pain/edema Reason for Exam: 2nd digit pain, edema FINDINGS: There is severe joint space narrowing throughout the digits with prominent osteophytes and mild deformity of the digits. No acute fracture or dislocation is seen. There is a well corticated bony fragment along the styloid process of the ulna. The carpal bones are intact. There is moderate narrowing along the base of the thumb. The bones are osteopenic. No erosions are seen and the soft tissues are unremarkable. There is mild soft tissue swelling throughout the digits. 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 No acute bony abnormality is seen. Old unfused fracture along the styloid process of the ulna. XR CHEST PORTABLE    Result Date: 10/15/2022  EXAMINATION: ONE XRAY VIEW OF THE CHEST 10/15/2022 7:54 pm COMPARISON: None. HISTORY: ORDERING SYSTEM PROVIDED HISTORY: bradycardia TECHNOLOGIST PROVIDED HISTORY: Reason for exam:->bradycardia Reason for Exam: bradycardia FINDINGS: A portable upright frontal view chest radiograph was obtained. The heart is enlarged. The mediastinal contour and pleural spaces are otherwise within normal limits. The pulmonary vascular pattern is increased. There is no focal consolidation or pneumothorax. No acute thoracic osseous abnormality. Moderate pulmonary vascular congestive change. Minimal superimposed right upper lobe atelectasis. Cardiomegaly. No significant pleural effusion. I directly reviewed all recent imaging studies as well as pertinent prior studies. Radiology reports may or may not be available at the time of my review.       EKG:  New and pertinent prior tracings were directly reviewed. My interpretation is as follows:  10/11/22 baseline tracing:  sinus bradycardia with 1st degree AV renita and LBBB. 10/15 tracings:  rhythm is idiovenricular and bradycardic in the 20's-30s. Echo 7/5/22    * Left ventricular chamber dimension is normal.     * Left ventricular function is mildly reduced with an estimated ejection fraction of 40-45%. * Left ventricular segmental wall motion is abnormal.     * Right ventricular systolic function is normal.     * Unable to estimate pulmonary arterial systolic pressure due to lack of tricuspid regurgitation jet. * There is mild aortic valve regurgitation. * The aortic arch is mildly dilated. * The basal inferior wall, mid inferior wall, basal anterolateral wall, mid   anterolateral wall, basal inferolateral wall, and mid inferolateral wall are   hypokinetic. * All other walls appear normal.     Lexiscan MPI 7/7/22    * Mild heterogeneity of isotope uptake noted but not in a pattern consistent with ischemia. * Ejection fraction is normal.     * No significant reversible defects. Active Hospital Problems    Diagnosis Date Noted    Complete heart block ([address] [I44.2] 10/16/2022     Priority: Medium    Hypotension [I95.9] 10/16/2022     Priority: Medium    Systolic heart failure ([address] [I50.20] 10/16/2022     Priority: Medium    LBBB (left bundle branch block) [I44.7] 10/16/2022     Priority: Medium    Symptomatic bradycardia [R00.1] 10/16/2022     Priority: Medium    Paroxysmal atrial fibrillation ([address] [I48.0] 10/12/2022     Priority: Medium    Schizoaffective disorder ([address] [F25.9] 10/11/2022     Priority: Medium       ASSESSMENT & PLAN  Bradycardia, Hypotension  -  Complete heart block now s/p urgent placement of a temporary transvenous pacing lead late 10/15. Rate is set to 60 bpm and there is 100% electromechanical capture at this time even though patient was agitated earlier and nearly pulled it out.   He has received geodon and is now calm and resting.  -  Patient's BP has improved since pacing started but he is still requiring dopamine infusion currently running at 10 mcg/kg/min. Hopefully as his temperature normalizes his BP will rise as well. -  Cardiology assistance is appreciated. He will be seen by EP to determine if a permanent pacemaker will be necessary. Hypothermia  -  Initial mild-moderate hypothermia w/ temperature as low as 86.7. Temperature is rising in response to active external warming measures which will continue.  -  TSH = 1.82 on 10/11 and 2.04 on 10/15. Systolic HF  -  TTE 8/9/69:  LVEF = 40-45%. Another echo is ordered this admission.  -  Continue home lisinopril 10mg daily and furosemide 40mg daily once BP can tolerate. Schizoaffective D/O  -  Continue home olanzapine 20mg nightly, trazodone 50mg nightly. DVT prophylaxis: SCDs, lovenox (monitor platelet count closely which was on the low side at the time of admission)  Code Status:  Full  Disposition:  Inpatient w/ likely d/c back to inpatient psychiatric unit at [place] when medically appropriate.     Carrington Mcfarlane MD MD

## 2022-10-16 NOTE — ED NOTES
Called lab regarding lab status. Lab staff ensured writer jaja all labs received and running despite them just showing active in my chart.       Christina Lakhani RN  10/15/22 [id number]

## 2022-10-16 NOTE — PROGRESS NOTES
10/16/22 1953   Patient Observation   Heart Rate 80   Resp 20   SpO2 99 %   Breath Sounds   Right Upper Lobe Clear   Right Middle Lobe Diminished   Right Lower Lobe Clear   Left Upper Lobe Diminished   Left Lower Lobe Diminished   Vent Information   Vent Mode AC/PRVC   Ventilator Settings   FiO2  50 %   Resp Rate (Set) 15 bmp   PEEP/CPAP (cmH2O) 5   Vt (Set, mL) 500 mL   Vent Patient Data (Readings)   Vt (Measured) 507 mL   Peak Inspiratory Pressure (cmH2O) 16 cmH2O   Rate Measured 15 br/min   Minute Volume (L/min) 8.69 Liters   Mean Airway Pressure (cmH2O) 8.6 cmH20   I:E Ratio 1:2.50   I Time/ I Time % 1 s   Vent Alarm Settings   High Pressure (cmH2O) 40 cmH2O   Low Minute Volume (lpm) 2 L/min   High Minute Volume (lpm) 20 L/min   Low Exhaled Vt (ml) 200 mL   High Exhaled Vt (ml) 1000 mL   RR High (bpm) 40 br/min   Apnea (secs) 20 secs   Additional Respiratoray Assessments   Humidification Source HME   Ambu Bag With Mask At Bedside Yes   Airway Clearance   Suction ET Tube   Suction Device Inline suction catheter   Sputum Method Obtained Endotracheal   Sputum Amount Scant   Sputum Color/Odor Bloody   Sputum Consistency Thin   ETT (adult)   Placement Date/Time: 10/16/22 1630   Present on Admission/Arrival: No  Placed By: Licensed provider  Placement Verified By: Colorimetric ETCO2 device; Chest X-ray; Auscultation  Preoxygenation: Yes  Mask Ventilation: Ventilated by mask (1)  Technique: D...    Secured At 24 cm   Measured From Lips   ETT Placement Right   Secured By Commercial tube rordiguez   Cuff Pressure 30 cm H2O

## 2022-10-16 NOTE — PROCEDURES
CARDIAC CATHETERIZATION REPORT    Date of Procedure: 10/15/2022  : Suzon Gottron Haddox, DO  Primary Indication: Complete heart block    Procedures Performed:  1. Temporary transvenous pacemaker insertion    Procedural Details:  Local anesthetic was given and access was obtained in the right internal jugular vein using ultrasound guidance and a micropuncture technique and a 6 Nepali sheath was placed without difficulty. A 5 Nepali temporary transvenous pacemaker wire was then inserted through the sheath and positioned along the RV septum. Appropriate capture was obtained and backup pacing was set to 60 bpm at 8 mA. A sterile cover was placed over the pacing wire which was then sutured in place at a depth of 36 cm. Technical Factors:  Complications:  None. Estimated blood loss: Minimal.  Radiation:  Air kerma 284 mGy and 10.8 minutes of fluoroscopy  Medications: SC lidocaine  Contrast: 0 cc    Impression:  1. Successful temporary transvenous pacemaker insertion. Plan:  1. Admit to ICU with temporary transvenous pacemaker in place with back-up pacing set to 60 bpm.  2. Avoid negative chronotropic and AV megan blocking agents. 3. Continue work-up for other secondary causes of bradycardia.       Isi Ojeda, [address]

## 2022-10-16 NOTE — ED PROVIDER NOTES
CHIEF COMPLAINT  Bradycardia (Pt from [facility] with HR in the 20s. Arrived somnolent. )      HISTORY OF PRESENT ILLNESS  Sean Saldivar is a 78 y.o. male with a history of atrial fibrillation, atrial flutter, CAD, cardiomyopathy, CHF, CKD, HTN, schizophrenia who presents to the ED from geriatric psych after a CODE BLUE was called. Patient was reported to have been briefly unresponsive and staff had difficulty palpating a pulse. Hospitalist responded and patient was brought down to the ER. Prior to arrival in the ER he was awake and interactive and found to be bradycardic with heart rate in the low 20s. He was initially misregistered so he had difficulty confirming past medical history, meds, recent labs and so forth. It was reported by one of the behavioral health nurses that he had been experiencing bradycardia today and she believes they held his metoprolol for that reason. Patient does not contribute to the history. Unclear at this time if he has baseline dementia versus acute confusion versus psychiatric illness. No other complaints, modifying factors or associated symptoms. I have reviewed the following from the nursing documentation. Past Medical History:   Diagnosis Date    Acute kidney injury ([address]     Anemia     Hx of     Arthritis     Atrial fibrillation ([address]     Atrial flutter ([address]     converted to NSR, Poor candidate for anticoagulation. CAD (coronary artery disease)     Cardiomyopathy ([address]     ? Nonishcemic, Echo 10/2014 LVEF 25-30%.      Cellulitis of right upper extremity     CHF (congestive heart failure) ([facility])     systolic    CKD (chronic kidney disease)     Cr 2.3 10/2014, not on ACE/ARB due to CKD    Hypertension     Hypovolemia     PVC's (premature ventricular contractions)     secondary to hypokalemia    Schizophrenia (HCC)     hx of    Seizures ([facility])     Urinary tract infection      Past Surgical History:   Procedure Laterality Date    APPENDECTOMY      TURP       Family History Problem Relation Age of Onset    Arthritis Mother     Heart Disease Father      Social History     Socioeconomic History    Marital status: Single     Spouse name: Not on file    Number of children: Not on file    Years of education: Not on file    Highest education level: Not on file   Occupational History    Not on file   Tobacco Use    Smoking status: Former     Packs/day: 3.00     Years: 20.00     Pack years: 60.00     Types: Cigarettes    Smokeless tobacco: Not on file   Vaping Use    Vaping Use: Unknown   Substance and Sexual Activity    Alcohol use: No    Drug use: No    Sexual activity: Not Currently   Other Topics Concern    Not on file   Social History Narrative    Not on file     Social Determinants of Health     Financial Resource Strain: Not on file   Food Insecurity: Not on file   Transportation Needs: Not on file   Physical Activity: Not on file   Stress: Not on file   Social Connections: Not on file   Intimate Partner Violence: Not on file   Housing Stability: Not on file     No current facility-administered medications for this encounter. Current Outpatient Medications   Medication Sig Dispense Refill    amiodarone (CORDARONE) 200 MG tablet Take 1 tablet by mouth daily. 30 tablet 0    benzonatate (TESSALON) 100 MG capsule Take 100 mg by mouth 3 times daily as needed for Cough. docusate sodium (COLACE) 100 MG capsule Take 100 mg by mouth 2 times daily. aspirin 325 MG EC tablet Take 325 mg by mouth daily. ferrous sulfate 325 (65 FE) MG tablet Take 325 mg by mouth daily (with breakfast). tamsulosin (FLOMAX) 0.4 MG capsule Take 0.4 mg by mouth daily. simvastatin (ZOCOR) 20 MG tablet Take 20 mg by mouth nightly. levothyroxine (SYNTHROID) 25 MCG tablet Take 25 mcg by mouth Daily. ascorbic acid (VITAMIN C) 500 MG tablet Take 500 mg by mouth daily. Nutritional Supplements (ARGINAID EXTRA) LIQD by Does not apply route.       potassium chloride SA (K-DUR;KLOR-CON M) 20 MEQ tablet Take 20 mEq by mouth 2 times daily. furosemide (LASIX) 20 MG tablet Take 20 mg by mouth 2 times daily. metoprolol (TOPROL-XL) 50 MG XL tablet Take 50 mg by mouth 2 times daily. Multiple Vitamins-Minerals (THERAPEUTIC MULTIVITAMIN-MINERALS) tablet Take 1 tablet by mouth 2 times daily. pantoprazole (PROTONIX) 20 MG tablet Take 20 mg by mouth 2 times daily. OLANZapine (ZYPREXA) 10 MG tablet Take 10 mg by mouth 2 times daily. divalproex (DEPAKOTE SPRINKLE) 125 MG capsule Take 125 mg by mouth 3 times daily. LORazepam (ATIVAN) 1 MG tablet Take 1 mg by mouth every 6 hours as needed for Anxiety. melatonin 3 MG TABS tablet Take 3 mg by mouth as needed. diphenhydrAMINE-APAP, sleep, (TYLENOL PM EXTRA STRENGTH)  MG tablet Take 2 tablets by mouth nightly as needed for Sleep.      acetaminophen (TYLENOL) 325 MG tablet Take 650 mg by mouth every 6 hours as needed for Pain. Facility-Administered Medications Ordered in Other Encounters   Medication Dose Route Frequency Provider Last Rate Last Admin    DOPamine (INTROPIN) 400 mg in dextrose 5 % 250 mL infusion  1-20 mcg/kg/min IntraVENous Continuous Chaz Rojas MD 15.2 mL/hr at 10/15/22 2230 5 mcg/kg/min at 10/15/22 2230     Allergies   Allergen Reactions    Penicillins        REVIEW OF SYSTEMS  Unable to obtain due to mental status. PHYSICAL EXAM  BP (!) 109/59   Pulse 51   Temp (!) 88.2 °F (31.2 °C)   Resp 23   Ht 5' 8\" (1.727 m)   Wt 180 lb (81.6 kg)   SpO2 98%   BMI 27.37 kg/m²   GENERAL APPEARANCE: Awake and cooperative. Appears elderly, frail, pale. Overall ill-appearing. HEAD: Normocephalic. Atraumatic. EYES: PERRL. EOM's grossly intact. ENT: Mucous membranes are moist.   NECK: Supple, trachea midline. HEART: Unclear rhythm, rate 24, 1 out of 6 systolic murmur. LUNGS: Respirations unlabored. Moderate air exchange. Faint bibasilar rales. No wheezing or rhonchi. ABDOMEN: Soft. Non-distended. Non-tender. No guarding or rebound. Normal Bowel sounds. EXTREMITIES: No peripheral edema. MAEE. No acute deformities. SKIN: Cool and dry. No acute rashes. NEUROLOGICAL: Awake. Mumbles in response to questions. Unclear how oriented the patient is. He will follow some simple basic commands. Appears to be moving all extremities. No obvious facial asymmetry. PSYCHIATRIC: Flat affect. LABS  I have reviewed all labs for this visit.    Results for orders placed or performed during the hospital encounter of 10/15/22   CMP w/ Reflex to MG   Result Value Ref Range    Sodium 127 (L) 136 - 145 mmol/L    Chloride 95 (L) 99 - 110 mmol/L    CO2 27 21 - 32 mmol/L    Anion Gap 5 3 - 16    Glucose 130 (H) 70 - 99 mg/dL    BUN 28 (H) 7 - 20 mg/dL    Creatinine 1.2 0.8 - 1.3 mg/dL    GFR Non- 59 (A) >60    GFR African American >60 >60    Calcium 8.6 8.3 - 10.6 mg/dL    Total Protein 7.1 6.4 - 8.2 g/dL    Albumin 3.6 3.4 - 5.0 g/dL    Albumin/Globulin Ratio 1.0 (L) 1.1 - 2.2    Total Bilirubin <0.2 0.0 - 1.0 mg/dL    Alkaline Phosphatase 109 40 - 129 U/L    ALT 50 (H) 10 - 40 U/L    AST 95 (H) 15 - 37 U/L   Magnesium   Result Value Ref Range    Magnesium 2.70 (H) 1.80 - 2.40 mg/dL   Troponin   Result Value Ref Range    Troponin <0.01 <0.01 ng/mL   Brain Natriuretic Peptide   Result Value Ref Range    Pro- 0 - 449 pg/mL   Digoxin Level   Result Value Ref Range    Digoxin Lvl <0.3 (L) 0.8 - 2.0 ng/mL   TSH with Reflex   Result Value Ref Range    TSH 2.04 0.27 - 4.20 uIU/mL   SPECIMEN REJECTION   Result Value Ref Range    Rejected Test K     Reason for Rejection see below    EKG 12 Lead   Result Value Ref Range    Ventricular Rate 26 BPM    Atrial Rate 18 BPM    QRS Duration 154 ms    Q-T Interval 662 ms    QTc Calculation (Bazett) 434 ms    R Axis 185 degrees    T Axis 159 degrees    Diagnosis       ** Suspect arm lead reversal, interpretation assumes no reversalIdioventricular rhythmNon-specific intra-ventricular conduction blockLateral infarct , age undeterminedAbnormal ECGWhen compared with ECG of 15-OCT-2022 20:14, (unconfirmed)No significant change was found         EKG  Complete heart block, rate 26, left bundle branch block pattern with QRS widening. No priors available for comparison at this time although prior documentation notes a history of left bundle branch block. RADIOLOGY  X-RAYS:  I have reviewed radiologic plain film image(s). ALL OTHER NON-PLAIN FILM IMAGES SUCH AS CT, ULTRASOUND AND MRI HAVE BEEN READ BY THE RADIOLOGIST. XR CHEST PORTABLE   Final Result   Moderate pulmonary vascular congestive change. Minimal superimposed right   upper lobe atelectasis. Cardiomegaly. No significant pleural effusion. Rechecks: Physical assessment performed. Still ill-appearing and frail however vital signs are slowly improving. Critical Care: I personally saw the patient and independently provided 38 minutes of non-concurrent critical care out of the total shared critical care time provided. Management of complete heart block, symptomatic bradycardia with hypotension. Consultation with 2 different cardiologists, initiation of treatment with atropine and dopamine drip. ED COURSE/MDM  Patient seen and evaluated. Old records reviewed. Labs and imaging reviewed and results discussed with patient. Patient brought down from the geriatric psychiatric unit after apparent cardiogenic syncope. Was found to be in severe bradycardia with heart rate in the low 20s. Appears like complete heart block. Was also noted to be hypothermic. Patient alert upon arrival in the ER however is an extremely poor historian with significant psychiatric history. Does not appear to indicate he has chest pain or dyspnea. He was initially hypotensive and was treated with an IV fluid bolus and atropine. Case discussed at 8:35 PM with Dr. Lalito Baptiste.   Dopamine was initiated. Discussed with Dr. Gabrielle Solis who agreed to accept the patient to the Cath Lab at [facility]. Recommended isoproterenol if patient was unresponsive to dopamine. With dopamine infusion heart rate improved into the 40s and he became normotensive. Bear hugger applied with improvement in his temperature which was monitored with a temp sensing Peterson. Patient was sent promptly to [facility] via helicopter. Labs were ordered but not resulted prior to his departure. Discharge Medication List as of 10/15/2022  9:41 PM          CLINICAL IMPRESSION  1. Complete heart block ([address]    2. Hypothermia, initial encounter    3. Syncope, cardiogenic        Blood pressure (!) 109/59, pulse 51, temperature (!) 88.2 °F (31.2 °C), resp. rate 23, height 5' 8\" (1.727 m), weight 180 lb (81.6 kg), SpO2 98 %. DISPOSITION  Carmelo Parrish was transferred in guarded condition.         Alex Stout MD  10/15/22 6220

## 2022-10-16 NOTE — PROGRESS NOTES
Patient unable to swallow effectively. Glucose 67, verbal order for d10 50mL/hr to be given. MD at bedside and aware. Patient not currently appropriate for speech therapy.

## 2022-10-17 ENCOUNTER — APPOINTMENT (OUTPATIENT)
Dept: GENERAL RADIOLOGY | Age: 77
DRG: 308 | End: 2022-10-17
Payer: MEDICARE

## 2022-10-17 PROBLEM — T17.908A ASPIRATION INTO AIRWAY: Status: ACTIVE | Noted: 2022-10-17

## 2022-10-17 PROBLEM — R82.81 PYURIA: Status: ACTIVE | Noted: 2022-10-17

## 2022-10-17 LAB
A/G RATIO: 1.2 (ref 1.1–2.2)
ALBUMIN SERPL-MCNC: 3.2 G/DL (ref 3.4–5)
ALP BLD-CCNC: 105 U/L (ref 40–129)
ALT SERPL-CCNC: 37 U/L (ref 10–40)
ANION GAP SERPL CALCULATED.3IONS-SCNC: 10 MMOL/L (ref 3–16)
AST SERPL-CCNC: 30 U/L (ref 15–37)
BACTERIA: ABNORMAL /HPF
BASE EXCESS ARTERIAL: 3.6 MMOL/L (ref -3–3)
BASOPHILS ABSOLUTE: 0 K/UL (ref 0–0.2)
BASOPHILS RELATIVE PERCENT: 0.2 %
BILIRUB SERPL-MCNC: 0.3 MG/DL (ref 0–1)
BILIRUBIN URINE: NEGATIVE
BLOOD, URINE: ABNORMAL
BUN BLDV-MCNC: 33 MG/DL (ref 7–20)
CALCIUM SERPL-MCNC: 8.6 MG/DL (ref 8.3–10.6)
CARBOXYHEMOGLOBIN ARTERIAL: 0.6 % (ref 0–1.5)
CHLORIDE BLD-SCNC: 95 MMOL/L (ref 99–110)
CLARITY: ABNORMAL
CO2: 27 MMOL/L (ref 21–32)
COLOR: YELLOW
CORTISOL TOTAL: 23 UG/DL
CREAT SERPL-MCNC: 1.4 MG/DL (ref 0.8–1.3)
EKG ATRIAL RATE: 76 BPM
EKG DIAGNOSIS: NORMAL
EKG P AXIS: 37 DEGREES
EKG P-R INTERVAL: 264 MS
EKG Q-T INTERVAL: 420 MS
EKG QRS DURATION: 150 MS
EKG QTC CALCULATION (BAZETT): 472 MS
EKG R AXIS: -23 DEGREES
EKG T AXIS: 115 DEGREES
EKG VENTRICULAR RATE: 76 BPM
EOSINOPHILS ABSOLUTE: 0 K/UL (ref 0–0.6)
EOSINOPHILS RELATIVE PERCENT: 0.3 %
EPITHELIAL CELLS, UA: ABNORMAL /HPF (ref 0–5)
GFR AFRICAN AMERICAN: 60
GFR NON-AFRICAN AMERICAN: 49
GLUCOSE BLD-MCNC: 100 MG/DL (ref 70–99)
GLUCOSE BLD-MCNC: 105 MG/DL (ref 70–99)
GLUCOSE BLD-MCNC: 107 MG/DL (ref 70–99)
GLUCOSE BLD-MCNC: 133 MG/DL (ref 70–99)
GLUCOSE BLD-MCNC: 96 MG/DL (ref 70–99)
GLUCOSE BLD-MCNC: 99 MG/DL (ref 70–99)
GLUCOSE BLD-MCNC: 99 MG/DL (ref 70–99)
GLUCOSE URINE: NEGATIVE MG/DL
HCO3 ARTERIAL: 28.3 MMOL/L (ref 21–29)
HCT VFR BLD CALC: 32.5 % (ref 40.5–52.5)
HEMOGLOBIN, ART, EXTENDED: 14.2 G/DL (ref 13.5–17.5)
HEMOGLOBIN: 10.7 G/DL (ref 13.5–17.5)
INR BLD: 1.1 (ref 0.87–1.14)
KETONES, URINE: NEGATIVE MG/DL
LEUKOCYTE ESTERASE, URINE: ABNORMAL
LV EF: 35 %
LVEF MODALITY: NORMAL
LYMPHOCYTES ABSOLUTE: 0.7 K/UL (ref 1–5.1)
LYMPHOCYTES RELATIVE PERCENT: 9.8 %
MAGNESIUM: 2.5 MG/DL (ref 1.8–2.4)
MCH RBC QN AUTO: 29.3 PG (ref 26–34)
MCHC RBC AUTO-ENTMCNC: 32.8 G/DL (ref 31–36)
MCV RBC AUTO: 89.2 FL (ref 80–100)
METHEMOGLOBIN ARTERIAL: 0.6 %
MICROSCOPIC EXAMINATION: YES
MONOCYTES ABSOLUTE: 1 K/UL (ref 0–1.3)
MONOCYTES RELATIVE PERCENT: 14.1 %
NEUTROPHILS ABSOLUTE: 5.3 K/UL (ref 1.7–7.7)
NEUTROPHILS RELATIVE PERCENT: 75.6 %
NITRITE, URINE: POSITIVE
O2 SAT, ARTERIAL: 96.7 %
O2 THERAPY: ABNORMAL
PCO2 ARTERIAL: 42.8 MMHG (ref 35–45)
PDW BLD-RTO: 15.9 % (ref 12.4–15.4)
PERFORMED ON: ABNORMAL
PERFORMED ON: NORMAL
PH ARTERIAL: 7.44 (ref 7.35–7.45)
PH UA: >=9 (ref 5–8)
PHOSPHORUS: 3.8 MG/DL (ref 2.5–4.9)
PLATELET # BLD: 147 K/UL (ref 135–450)
PMV BLD AUTO: 8.8 FL (ref 5–10.5)
PO2 ARTERIAL: 89.1 MMHG (ref 75–108)
POTASSIUM REFLEX MAGNESIUM: 4.6 MMOL/L (ref 3.5–5.1)
PROTEIN UA: 100 MG/DL
PROTHROMBIN TIME: 14.1 SEC (ref 11.7–14.5)
RBC # BLD: 3.64 M/UL (ref 4.2–5.9)
RBC UA: ABNORMAL /HPF (ref 0–4)
SODIUM BLD-SCNC: 132 MMOL/L (ref 136–145)
SPECIFIC GRAVITY UA: 1.01 (ref 1–1.03)
TCO2 ARTERIAL: 29.6 MMOL/L
TOTAL PROTEIN: 5.9 G/DL (ref 6.4–8.2)
URINE REFLEX TO CULTURE: YES
URINE TYPE: ABNORMAL
UROBILINOGEN, URINE: 0.2 E.U./DL
WBC # BLD: 7 K/UL (ref 4–11)
WBC UA: ABNORMAL /HPF (ref 0–5)

## 2022-10-17 PROCEDURE — 87186 SC STD MICRODIL/AGAR DIL: CPT

## 2022-10-17 PROCEDURE — 2580000003 HC RX 258: Performed by: INTERNAL MEDICINE

## 2022-10-17 PROCEDURE — 85025 COMPLETE CBC W/AUTO DIFF WBC: CPT

## 2022-10-17 PROCEDURE — 94761 N-INVAS EAR/PLS OXIMETRY MLT: CPT

## 2022-10-17 PROCEDURE — 36415 COLL VENOUS BLD VENIPUNCTURE: CPT

## 2022-10-17 PROCEDURE — A4217 STERILE WATER/SALINE, 500 ML: HCPCS | Performed by: INTERNAL MEDICINE

## 2022-10-17 PROCEDURE — 6360000002 HC RX W HCPCS: Performed by: INTERNAL MEDICINE

## 2022-10-17 PROCEDURE — 71045 X-RAY EXAM CHEST 1 VIEW: CPT

## 2022-10-17 PROCEDURE — 99291 CRITICAL CARE FIRST HOUR: CPT | Performed by: INTERNAL MEDICINE

## 2022-10-17 PROCEDURE — 84100 ASSAY OF PHOSPHORUS: CPT

## 2022-10-17 PROCEDURE — 0B9F8ZX DRAINAGE OF RIGHT LOWER LUNG LOBE, VIA NATURAL OR ARTIFICIAL OPENING ENDOSCOPIC, DIAGNOSTIC: ICD-10-PCS | Performed by: INTERNAL MEDICINE

## 2022-10-17 PROCEDURE — 6370000000 HC RX 637 (ALT 250 FOR IP): Performed by: INTERNAL MEDICINE

## 2022-10-17 PROCEDURE — 2709999900 HC NON-CHARGEABLE SUPPLY: Performed by: INTERNAL MEDICINE

## 2022-10-17 PROCEDURE — 87077 CULTURE AEROBIC IDENTIFY: CPT

## 2022-10-17 PROCEDURE — 6360000002 HC RX W HCPCS: Performed by: EMERGENCY MEDICINE

## 2022-10-17 PROCEDURE — 3609010800 HC BRONCHOSCOPY ALVEOLAR LAVAGE: Performed by: INTERNAL MEDICINE

## 2022-10-17 PROCEDURE — 94003 VENT MGMT INPAT SUBQ DAY: CPT

## 2022-10-17 PROCEDURE — C9113 INJ PANTOPRAZOLE SODIUM, VIA: HCPCS | Performed by: INTERNAL MEDICINE

## 2022-10-17 PROCEDURE — 2000000000 HC ICU R&B

## 2022-10-17 PROCEDURE — 87184 SC STD DISK METHOD PER PLATE: CPT

## 2022-10-17 PROCEDURE — 87205 SMEAR GRAM STAIN: CPT

## 2022-10-17 PROCEDURE — 87070 CULTURE OTHR SPECIMN AEROBIC: CPT

## 2022-10-17 PROCEDURE — 87086 URINE CULTURE/COLONY COUNT: CPT

## 2022-10-17 PROCEDURE — 3609010900 HC BRONCHOSCOPY THERAPUTIC ASPIRATION INITIAL: Performed by: INTERNAL MEDICINE

## 2022-10-17 PROCEDURE — 93010 ELECTROCARDIOGRAM REPORT: CPT | Performed by: INTERNAL MEDICINE

## 2022-10-17 PROCEDURE — 31624 DX BRONCHOSCOPE/LAVAGE: CPT | Performed by: INTERNAL MEDICINE

## 2022-10-17 PROCEDURE — 81001 URINALYSIS AUTO W/SCOPE: CPT

## 2022-10-17 PROCEDURE — C8929 TTE W OR WO FOL WCON,DOPPLER: HCPCS

## 2022-10-17 PROCEDURE — 80053 COMPREHEN METABOLIC PANEL: CPT

## 2022-10-17 PROCEDURE — 85610 PROTHROMBIN TIME: CPT

## 2022-10-17 PROCEDURE — 83735 ASSAY OF MAGNESIUM: CPT

## 2022-10-17 PROCEDURE — 2700000000 HC OXYGEN THERAPY PER DAY

## 2022-10-17 PROCEDURE — 82803 BLOOD GASES ANY COMBINATION: CPT

## 2022-10-17 RX ORDER — CHLORHEXIDINE GLUCONATE 0.12 MG/ML
15 RINSE ORAL 2 TIMES DAILY
Status: DISCONTINUED | OUTPATIENT
Start: 2022-10-17 | End: 2022-10-21

## 2022-10-17 RX ORDER — ACETYLCYSTEINE 200 MG/ML
SOLUTION ORAL; RESPIRATORY (INHALATION) PRN
Status: DISCONTINUED | OUTPATIENT
Start: 2022-10-17 | End: 2022-10-17 | Stop reason: ALTCHOICE

## 2022-10-17 RX ORDER — MAGNESIUM HYDROXIDE 1200 MG/15ML
LIQUID ORAL CONTINUOUS PRN
Status: COMPLETED | OUTPATIENT
Start: 2022-10-17 | End: 2022-10-17

## 2022-10-17 RX ORDER — NAPROXEN 500 MG/1
500 TABLET ORAL 2 TIMES DAILY WITH MEALS
Status: ON HOLD | COMMUNITY
End: 2022-10-28 | Stop reason: HOSPADM

## 2022-10-17 RX ORDER — CARBOXYMETHYLCELLULOSE SODIUM 10 MG/ML
1 GEL OPHTHALMIC
Status: DISCONTINUED | OUTPATIENT
Start: 2022-10-17 | End: 2022-10-20

## 2022-10-17 RX ORDER — LISINOPRIL 20 MG/1
10 TABLET ORAL DAILY
Status: ON HOLD | COMMUNITY
End: 2022-10-28 | Stop reason: SDUPTHER

## 2022-10-17 RX ORDER — MIRTAZAPINE 15 MG/1
7.5 TABLET, FILM COATED ORAL NIGHTLY
COMMUNITY

## 2022-10-17 RX ADMIN — ATORVASTATIN CALCIUM 20 MG: 10 TABLET, FILM COATED ORAL at 21:03

## 2022-10-17 RX ADMIN — HEPARIN SODIUM 5000 UNITS: 5000 INJECTION INTRAVENOUS; SUBCUTANEOUS at 13:54

## 2022-10-17 RX ADMIN — DOCUSATE SODIUM 100 MG: 100 CAPSULE, LIQUID FILLED ORAL at 08:29

## 2022-10-17 RX ADMIN — DEXTROSE MONOHYDRATE: 100 INJECTION, SOLUTION INTRAVENOUS at 07:37

## 2022-10-17 RX ADMIN — MUPIROCIN: 20 OINTMENT TOPICAL at 21:03

## 2022-10-17 RX ADMIN — CARBOXYMETHYLCELLULOSE SODIUM 1 DROP: 10 GEL OPHTHALMIC at 20:10

## 2022-10-17 RX ADMIN — OLANZAPINE 20 MG: 5 TABLET, FILM COATED ORAL at 21:03

## 2022-10-17 RX ADMIN — Medication 15 ML: at 21:03

## 2022-10-17 RX ADMIN — ASPIRIN 81 MG 81 MG: 81 TABLET ORAL at 08:29

## 2022-10-17 RX ADMIN — HEPARIN SODIUM 5000 UNITS: 5000 INJECTION INTRAVENOUS; SUBCUTANEOUS at 05:05

## 2022-10-17 RX ADMIN — CEFEPIME 2000 MG: 2 INJECTION, POWDER, FOR SOLUTION INTRAVENOUS at 10:11

## 2022-10-17 RX ADMIN — DOPAMINE HYDROCHLORIDE IN DEXTROSE 12.5 MCG/KG/MIN: 1.6 INJECTION, SOLUTION INTRAVENOUS at 00:46

## 2022-10-17 RX ADMIN — DOPAMINE HYDROCHLORIDE IN DEXTROSE 15 MCG/KG/MIN: 1.6 INJECTION, SOLUTION INTRAVENOUS at 13:53

## 2022-10-17 RX ADMIN — PANTOPRAZOLE SODIUM 40 MG: 40 INJECTION, POWDER, FOR SOLUTION INTRAVENOUS at 08:30

## 2022-10-17 RX ADMIN — DOCUSATE SODIUM 100 MG: 50 LIQUID ORAL at 21:03

## 2022-10-17 RX ADMIN — FENTANYL CITRATE 50 MCG/HR: 0.05 INJECTION, SOLUTION INTRAMUSCULAR; INTRAVENOUS at 07:45

## 2022-10-17 RX ADMIN — DOPAMINE HYDROCHLORIDE IN DEXTROSE 12.5 MCG/KG/MIN: 1.6 INJECTION, SOLUTION INTRAVENOUS at 07:39

## 2022-10-17 RX ADMIN — CARBOXYMETHYLCELLULOSE SODIUM 1 DROP: 10 GEL OPHTHALMIC at 08:29

## 2022-10-17 RX ADMIN — CARBOXYMETHYLCELLULOSE SODIUM 1 DROP: 10 GEL OPHTHALMIC at 12:10

## 2022-10-17 RX ADMIN — HEPARIN SODIUM 5000 UNITS: 5000 INJECTION INTRAVENOUS; SUBCUTANEOUS at 21:45

## 2022-10-17 RX ADMIN — CARBOXYMETHYLCELLULOSE SODIUM 1 DROP: 10 GEL OPHTHALMIC at 16:11

## 2022-10-17 RX ADMIN — MUPIROCIN: 20 OINTMENT TOPICAL at 08:29

## 2022-10-17 RX ADMIN — DOPAMINE HYDROCHLORIDE IN DEXTROSE 15 MCG/KG/MIN: 1.6 INJECTION, SOLUTION INTRAVENOUS at 19:38

## 2022-10-17 RX ADMIN — Medication 15 ML: at 08:30

## 2022-10-17 RX ADMIN — LEVOTHYROXINE SODIUM 125 MCG: 0.12 TABLET ORAL at 05:06

## 2022-10-17 RX ADMIN — CEFEPIME 2000 MG: 2 INJECTION, POWDER, FOR SOLUTION INTRAVENOUS at 16:56

## 2022-10-17 ASSESSMENT — PULMONARY FUNCTION TESTS
PIF_VALUE: 17
PIF_VALUE: 16
PIF_VALUE: 17
PIF_VALUE: 18
PIF_VALUE: 17
PIF_VALUE: 20
PIF_VALUE: 17
PIF_VALUE: 18
PIF_VALUE: 17
PIF_VALUE: 16
PIF_VALUE: 18
PIF_VALUE: 17
PIF_VALUE: 16
PIF_VALUE: 17
PIF_VALUE: 16
PIF_VALUE: 18
PIF_VALUE: 17
PIF_VALUE: 16
PIF_VALUE: 17
PIF_VALUE: 18
PIF_VALUE: 17
PIF_VALUE: 16
PIF_VALUE: 18
PIF_VALUE: 16
PIF_VALUE: 17
PIF_VALUE: 18
PIF_VALUE: 17

## 2022-10-17 ASSESSMENT — PAIN SCALES - GENERAL
PAINLEVEL_OUTOF10: 0

## 2022-10-17 NOTE — PROGRESS NOTES
Shift: 4222-2144 Day Shift     Admitting diagnosis: Symptomatic Bradycardia    Presentation to hospital: Flight    Surgery: Yes - Transvenous Pacer R- Jugular    Nursing assessment at handoff  stable    Emergency Contact/POA: Ayla Bridegroom - Sister  Family updated: Yes - At bedside    Most recent vitals: BP (!) 96/53   Pulse 81   Temp 98.3 °F (36.8 °C) (Bladder)   Resp 18   Ht 5' 8\" (1.727 m)   Wt 203 lb 4.2 oz (92.2 kg)   SpO2 100%   BMI 30.91 kg/m²      Rhythm: Normal Sinus Rhythm , Sinus Tachycardia , and Ectopy      NC/HFNC-  lpm  Respiratory support: - Ventilator Settings:    Vt (Set, mL): 500 mL  Resp Rate (Set): 15 bmp  FiO2 : 50 %    PEEP/CPAP (cmH2O): 5  Pressure Support: 0 cmH20    Vent days: Day 1      Increased O2 requirements: No    Admission weight Weight: 178 lb 9.2 oz (81 kg)  Today's weight   Wt Readings from Last 1 Encounters:   10/17/22 203 lb 4.2 oz (92.2 kg)         UOP >30ml/hr: Yes    Peterson need assessed each shift: Yes    Restraints: yes, bilateral wrist  Order current and documentation up to date? Yes    Lines/Drains  LDA Insertion Date Discontinued Date Dressing Changes   PIV  X3 10/15     TVP 10/15     Arterial       Peterson  10/15     Vas Cath      ETT       Surgical drains        Night Shift Hospitalist Interventions    Problem(Brief) Date Time Intervention Physician contacted                                               Drip rates at handoff:    dextrose 50 mL/hr at 10/17/22 1356    fentaNYL 50 mcg/hr (10/17/22 1356)    midazolam 1 mg/hr (10/17/22 1614)    DOPamine 15 mcg/kg/min (10/17/22 1356)       Hospital Course Daily Updates:  Admit Day# 2    10/17 Day Shift antibiotics added; bronch for aspiration. Tube feeds started at 10ml/hr.        Lab Data:   CBC:   Recent Labs     10/16/22  0403 10/17/22  0406   WBC 6.3 7.0   HGB 11.3* 10.7*   HCT 34.4* 32.5*   MCV 90.2 89.2    147     BMP:    Recent Labs     10/16/22  1200 10/17/22  0406   * 132*   K 5.6* 4.6   CO2 27 27 BUN 34* 33*   CREATININE 1.4* 1.4*     LIVR:   Recent Labs     10/16/22  0404 10/17/22  0406   AST 43* 30   ALT 50* 37     PT/INR:   Recent Labs     10/16/22  0403 10/16/22  0404 10/17/22  0406   PROT  --  6.6 5.9*   INR 1.04  --  1.10     APTT: No results for input(s): APTT in the last 72 hours.   ABG:   Recent Labs     10/17/22  0410   PHART 7.438   [id number] 42.8   PO2ART 89.1     Consults (if GI or Nephrology- which group?)-  Electrophysiology

## 2022-10-17 NOTE — PROGRESS NOTES
10/17/22 1949   Patient Observation   Heart Rate 86   Resp 15   SpO2 97 %   Vent Information   Vent Mode AC/PRVC   Ventilator Settings   FiO2  50 %   Resp Rate (Set) 15 bmp   PEEP/CPAP (cmH2O) 5   Vt (Set, mL) 500 mL   Vent Patient Data (Readings)   Vt (Measured) 516 mL   Peak Inspiratory Pressure (cmH2O) 17 cmH2O   Rate Measured 15 br/min   Minute Volume (L/min) 7.72 Liters   Mean Airway Pressure (cmH2O) 8.5 cmH20   I:E Ratio 1:2.50   I Time/ I Time % 1 s   Vent Alarm Settings   High Pressure (cmH2O) 40 cmH2O   Low Minute Volume (lpm) 2 L/min   Low Exhaled Vt (ml) 200 mL   RR High (bpm) 40 br/min   Additional Respiratoray Assessments   Humidification Source HME   Ambu Bag With Mask At Bedside Yes   Airway Clearance   Suction ET Tube   Suction Device Inline suction catheter   Sputum Method Obtained Endotracheal   Sputum Amount Moderate   Sputum Color/Odor Tan   Sputum Consistency Thick   ETT (adult)   Placement Date/Time: 10/16/22 1630   Present on Admission/Arrival: No  Placed By: Licensed provider  Placement Verified By: Colorimetric ETCO2 device; Chest X-ray; Auscultation  Preoxygenation: Yes  Mask Ventilation: Ventilated by mask (1)  Technique: D...    Secured At 22 cm   Measured From Lips   ETT Placement Right   Secured By Commercial tube rodriguez   Site Assessment Dry   Cuff Pressure 30 cm H2O

## 2022-10-17 NOTE — PROGRESS NOTES
10/16/22 7283   Patient Observation   Heart Rate 76   Resp 15   SpO2 98 %   Breath Sounds   Right Upper Lobe Clear   Right Middle Lobe Clear   Right Lower Lobe Clear   Left Upper Lobe Clear   Left Lower Lobe Clear   Vent Information   Vent Mode AC/PRVC   Ventilator Settings   FiO2  50 %   Insp Time (sec) 1.15 sec   Resp Rate (Set) 15 bmp   PEEP/CPAP (cmH2O) 5   Vt (Set, mL) 500 mL   Vent Patient Data (Readings)   Vt (Measured) 521 mL   Peak Inspiratory Pressure (cmH2O) 16 cmH2O   Rate Measured 15 br/min   Minute Volume (L/min) 7.71 Liters   Mean Airway Pressure (cmH2O) 8.2 cmH20   Inspiratory Time 1.15 sec   I:E Ratio 1:2.50   Flow Sensitivity 3 L/min   I Time/ I Time % 1.15 s   Backup Apnea On   Vent Alarm Settings   High Pressure (cmH2O) 40 cmH2O   Low Minute Volume (lpm) 2 L/min   Low Exhaled Vt (ml) 200 mL   RR High (bpm) 40 br/min   Apnea (secs) 20 secs   Additional Respiratoray Assessments   Humidification Source HME   Airway Clearance   Suction ET Tube   Suction Device Inline suction catheter   Sputum Method Obtained Endotracheal   Sputum Amount Small   Sputum Color/Odor Tan;Bloody   Sputum Consistency Thin;Thick   ETT (adult)   Placement Date/Time: 10/16/22 1630   Present on Admission/Arrival: No  Placed By: Licensed provider  Placement Verified By: Colorimetric ETCO2 device; Chest X-ray; Auscultation  Preoxygenation: Yes  Mask Ventilation: Ventilated by mask (1)  Technique: D...    Secured At 24 cm   Measured From Lips   ETT Placement Center   Secured By Commercial tube rodriguez   Site Assessment Cool;Dry   Cuff Pressure 30 cm H2O

## 2022-10-17 NOTE — PROGRESS NOTES
10/17/22 0320   Patient Observation   Heart Rate 90   Resp 15   SpO2 98 %   Breath Sounds   Right Upper Lobe Clear;Diminished   Right Middle Lobe Diminished   Right Lower Lobe Diminished   Left Upper Lobe Clear;Diminished   Left Lower Lobe Diminished   Vent Information   Vent Mode AC/PRVC   Ventilator Settings   FiO2  50 %   Insp Time (sec) 1.15 sec   Resp Rate (Set) 15 bmp   PEEP/CPAP (cmH2O) 5   Vt (Set, mL) 500 mL   Vent Patient Data (Readings)   Vt (Measured) 513 mL   Peak Inspiratory Pressure (cmH2O) 16 cmH2O   Rate Measured 15 br/min   Minute Volume (L/min) 7.69 Liters   Mean Airway Pressure (cmH2O) 8.2 cmH20   Inspiratory Time 1.15 sec   I:E Ratio 1:2.5   Flow Sensitivity 3 L/min   I Time/ I Time % 1.15 s   Backup Apnea On   Vent Alarm Settings   High Pressure (cmH2O) 40 cmH2O   Low Minute Volume (lpm) 2 L/min   Low Exhaled Vt (ml) 200 mL   RR High (bpm) 40 br/min   Apnea (secs) 20 secs   Additional Respiratoray Assessments   Humidification Source HME   Ambu Bag With Mask At Bedside Yes   Airway Clearance   Suction ET Tube   Suction Device Inline suction catheter   Sputum Method Obtained Endotracheal   Sputum Amount Moderate   Sputum Color/Odor Tan;Bloody   Sputum Consistency Thin;Thick   ETT (adult)   Placement Date/Time: 10/16/22 1630   Present on Admission/Arrival: No  Placed By: Licensed provider  Placement Verified By: Colorimetric ETCO2 device; Chest X-ray; Auscultation  Preoxygenation: Yes  Mask Ventilation: Ventilated by mask (1)  Technique: D...    Secured At 24 cm   Measured From Lips   ETT Placement Left   Secured By Commercial tube rodriguez   Site Assessment Cool;Dry   Cuff Pressure 30 cm H2O

## 2022-10-17 NOTE — PROGRESS NOTES
Hospitalist Progress Note      PCP: [facility]    Date of Admission: 10/15/2022    Chief Complaint: respiratory failure s/p intubation. HPI   78 y.o. male. Patient has a h/o schizoaffective disorder. He lives in [address] [place] [address]. He receives most of his medical care through the [place] in [address] [place] [address]. He was initially [facility] [place] [place] [address] [place], [place] for hallucinations and impulsive behavior. He was there for ten days then transferred to the [facility] inpatient psychiatry unit on 10/11. Around 9pm on 10/15 a code blue was called because the patient briefly went unresponsive. He had been bradycardic all day  He had been taking metoprolol chronically but this had been held throughout the day because of his bradycardia. His nurse was trying to check his vitals again as it appeared he was breathing abnormally. While vital signs were being checked the patient lost consciousness briefly (less than a minute). He awoke but was markedly bradycardic. He was sent to the ER where he was found to be hypothermic, hypotensive, and bradycardic to the 20s in complete heart block. He was transferred to [place] for urgent transvenous pacing lead placement. Patient had a h/o chronic systolic HF, thought to be nonischemic, and paroxysmal afib. The patient required sedation during temporary pacing lead placement and then became agitated thereafter. He required restraints and was given IM geodon, so was not meaningfully interactive or able to provide any history. According to Dr. Nevaeh Nicholson notes the patient had already been difficult to understand at baseline. Subjective: Pt remains intubated on dopamine drip.         Medications:  Reviewed    Infusion Medications    dextrose 50 mL/hr at 10/17/22 1356    fentaNYL 50 mcg/hr (10/17/22 1356)    midazolam 2 mg/hr (10/17/22 1356)    DOPamine 15 mcg/kg/min (10/17/22 1356)     Scheduled Medications mupirocin   Nasal BID    chlorhexidine  15 mL Mouth/Throat BID    carboxymethylcellulose PF  1 drop Both Eyes 6 times per day    cefepime  2,000 mg IntraVENous Q12H    levothyroxine  125 mcg Oral Daily    atorvastatin  20 mg Oral Nightly    docusate sodium  100 mg Oral BID    OLANZapine  20 mg Oral Nightly    heparin (porcine)  5,000 Units SubCUTAneous 3 times per day    pantoprazole  40 mg IntraVENous Daily    aspirin  81 mg Oral Daily     PRN Meds: potassium chloride **OR** potassium chloride, magnesium sulfate, sodium phosphate IVPB **OR** sodium phosphate IVPB **OR** sodium phosphate IVPB, ondansetron, acetaminophen **OR** acetaminophen, ziprasidone, melatonin, perflutren lipid microspheres      Intake/Output Summary (Last 24 hours) at 10/17/2022 1437  Last data filed at 10/17/2022 1357  Gross per 24 hour   Intake 2400.2 ml   Output 1100 ml   Net 1300.2 ml       Physical Exam Performed:    BP (!) 80/56   Pulse 86   Temp 98.3 °F (36.8 °C) (Bladder)   Resp 16   Ht 5' 8\" (1.727 m)   Wt 203 lb 4.2 oz (92.2 kg)   SpO2 100%   BMI 30.91 kg/m²     General appearance: intubated, sedated   HEENT: Pupils equal, round, and reactive to light. Conjunctivae/corneas clear. Neck: Supple, with full range of motion. No jugular venous distention. Trachea midline. Respiratory:  Normal respiratory effort. Clear to auscultation, bilaterally without Rales/Wheezes/Rhonchi. Cardiovascular: Regular rate and rhythm with normal S1/S2 without murmurs, rubs or gallops. Abdomen: Soft, non-tender, non-distended with normal bowel sounds. Musculoskeletal: No clubbing, cyanosis or edema bilaterally. Full range of motion without deformity. Skin: Skin color, texture, turgor normal.  No rashes or lesions. Neurologic:  Neurovascularly intact without any focal sensory/motor deficits.  Cranial nerves: II-XII intact, grossly non-focal.  Psychiatric: Alert and oriented, thought content appropriate, normal insight  Capillary Refill: Brisk, 3 seconds, normal   Peripheral Pulses: +2 palpable, equal bilaterally       Labs:   Recent Labs     10/15/22  2200 10/16/22  0403 10/17/22  0406   WBC 4.4 6.3 7.0   HGB 10.5* 11.3* 10.7*   HCT 33.2* 34.4* 32.5*   * 143 147     Recent Labs     10/15/22  2200 10/16/22  0404 10/16/22  1200 10/17/22  0406   * 131* 134* 132*   K 5.4* 6.2* 5.6* 4.6   CL 95* 95* 96* 95*   CO2 27 27 27 27   BUN 27* 30* 34* 33*   CREATININE 1.1 1.2 1.4* 1.4*   CALCIUM 8.9 8.9 8.9 8.6   PHOS 3.9 4.4  --  3.8     Recent Labs     10/15/22  2200 10/16/22  0404 10/17/22  0406   AST 43* 43* 30   ALT 46* 50* 37   BILITOT <0.2 0.3 0.3   ALKPHOS 104 115 105     Recent Labs     10/16/22  0403 10/17/22  0406   INR 1.04 1.10     Recent Labs     10/15/22  2040 10/15/22  2200   TROPONINI <0.01 <0.01       Urinalysis:      Lab Results   Component Value Date/Time    NITRU POSITIVE 10/17/2022 04:15 AM    WBCUA 10-20 10/17/2022 04:15 AM    BACTERIA 2+ 10/17/2022 04:15 AM    RBCUA 21-50 10/17/2022 04:15 AM    BLOODU LARGE 10/17/2022 04:15 AM    SPECGRAV 1.010 10/17/2022 04:15 AM    GLUCOSEU Negative 10/17/2022 04:15 AM       Radiology:  XR CHEST PORTABLE   Final Result   Supportive tubing projects in normal position. Low lung volumes. Right basilar opacity, indeterminate for atelectasis or pneumonia/aspiration   pneumonitis. XR CHEST PORTABLE   Final Result   Endotracheal tube tip is 3 cm above the ade. Enteric tube tip is within   the stomach with side port just below the gastroesophageal junction. XR ABDOMEN (KUB) (SINGLE AP VIEW)   Final Result   Endotracheal tube tip is 3 cm above the ade. Enteric tube tip is within   the stomach with side port just below the gastroesophageal junction.                  Assessment/Plan:    Active Hospital Problems    Diagnosis     Complete heart block ([address] [I44.2]      Priority: Medium    Hypotension [I95.9]      Priority: Medium    Systolic heart failure ([address] [I50.20] Priority: Medium    Symptomatic bradycardia [R00.1]      Priority: Medium    Hyperkalemia [E87.5]      Priority: Medium    AUSTIN (acute kidney injury) ([facility] [N17.9]      Priority: Medium    Hypoglycemia [E16.2]      Priority: Medium    Hypothermia [T68. XXXA]      Priority: Medium    Hyponatremia [E87.1]      Priority: Medium    Pulmonary infiltrates [R91.8]      Priority: Medium    Shock circulatory ([facility] [R57.9]      Priority: Medium    Normocytic hypochromic anemia [D50.9]      Priority: Medium    Observed seizure-like activity (HCC) [R56.9]      Priority: Medium    Acute respiratory failure with hypoxia (HCC) [J96.01]      Priority: Medium    Paroxysmal atrial fibrillation (HCC) [I48.0]      Priority: Medium    Schizoaffective disorder (HCC) [F25.9]      Priority: Medium    Cardiomyopathy ([facility] [I42.9]    Bradycardia, Hypotension  -  Complete heart block now s/p urgent placement of a temporary transvenous pacing lead late 10/15. Rate is set to 60 bpm and there is 100% electromechanical capture at this time even though patient was agitated earlier and nearly pulled it out. He has received geodon and is now calm and resting.  -  Patient's BP has improved since pacing started but he is still requiring dopamine infusion currently running at 10 mcg/kg/min. Hopefully as his temperature normalizes his BP will rise as well. -  Cardiology assistance is appreciated    AUSTIN  Monitor GFR, avoid nephrotoxic agent     Hypothermia  -  Initial mild-moderate hypothermia w/ temperature as low as 86.7. Temperature is rising in response to active external warming measures which will continue.  -  TSH = 1.82 on 10/11 and 2.04 on 10/15. Acute on chronic Systolic HF  New EF 62%  -  Continue home lisinopril 10mg daily and furosemide 40mg daily once BP can tolerate. Schizoaffective D/O  -  Continue home olanzapine 20mg nightly, trazodone 50mg nightly.          DVT Prophylaxis: heparin   Diet: ADULT TUBE FEEDING; Nasogastric; Renal Formula; Continuous; 10; Yes; 10; Q 4 hours; 35; 60; Q 4 hours; Protein; 1 bottle of proteinex daily via syringe, 30 mL before and after administration  Code Status: Full Code  PT/OT Eval Status: yes    Dispo - Critically ill        Richard Le MD

## 2022-10-17 NOTE — PROGRESS NOTES
INPATIENT PULMONARY CRITICAL CARE PROGRESS NOTE      Reason for visit    hypothermia, hypotensive and bradycardic status post urgent temporary transvenous pacemaker placement    SUBJECTIVE: Patient when seen this morning continues to be critically ill on mechanical vent support, patient was on 50% oxygen to maintain oxygen saturation, patient has moderate amount of thick secretions in the endotracheal tube, patient is afebrile, patient also was hypotensive overnight and was started on dopamine which was continued and the requirements had gone up overnight, patient also has had paced rhythm on the monitor, patient's glycemic control was acceptable after patient has started on D10 blood W which was continuing when seen this morning, patient continues to be on IV sedation to maintain patient mental synchrony, patient has had good urine output overnight with cumulative fluid balance of +582 mL, no other pertinent review of system could be obtained because of patient clinical status which was precarious critical         Physical Exam:  Blood pressure (!) 93/46, pulse 82, temperature 98.9 °F (37.2 °C), temperature source Bladder, resp. rate 12, height 5' 8\" (1.727 m), weight 203 lb 4.2 oz (92.2 kg), SpO2 97 %.'     Constitutional:  No acute distress. On mechanical vent support  HENT: Endotracheal tube present no thyromegaly. Eyes:  Conjunctivae are normal. Pupils equal, round, and reactive to light. No scleral icterus. Neck: . No tracheal deviation present. No obvious thyroid mass. Cardiovascular: Paced rhythm normal heart sounds. No right ventricular heave. No lower extremity edema. Pulmonary/Chest: No wheezes. Rales. Chest wall is not dull to percussion. No accessory muscle usage or stridor. Abdominal: Soft. Bowel sounds present. No distension or hernia. No tenderness. Musculoskeletal: No cyanosis. No clubbing. No obvious joint deformity. Lymphadenopathy: No cervical or supraclavicular adenopathy. Skin: Skin is warm and dry. No rash or nodules on the exposed extremities. No  Neurologic Intubated and sedated     Results:  CBC:   Recent Labs     10/15/22  2200 10/16/22  0403 10/17/22  0406   WBC 4.4 6.3 7.0   HGB 10.5* 11.3* 10.7*   HCT 33.2* 34.4* 32.5*   MCV 91.6 90.2 89.2   * 143 147     BMP:   Recent Labs     10/15/22  2200 10/16/22  0404 10/16/22  1200 10/17/22  0406   * 131* 134* 132*   K 5.4* 6.2* 5.6* 4.6   CL 95* 95* 96* 95*   CO2 27 27 27 27   PHOS 3.9 4.4  --  3.8   BUN 27* 30* 34* 33*   CREATININE 1.1 1.2 1.4* 1.4*     LIVER PROFILE:   Recent Labs     10/15/22  2200 10/16/22  0404 10/17/22  0406   AST 43* 43* 30   ALT 46* 50* 37   BILITOT <0.2 0.3 0.3   ALKPHOS 104 115 105     PT/INR:   Recent Labs     10/16/22  0403 10/17/22  0406   PROTIME 13.5 14.1   INR 1.04 1.10     APTT: No results for input(s): APTT in the last 72 hours. UA:  Recent Labs     10/17/22  0415   COLORU Yellow   PHUR >=9.0*   WBCUA 10-20*   RBCUA 21-50*   BACTERIA 2+*   CLARITYU SL CLOUDY*   SPECGRAV 1.010   LEUKOCYTESUR LARGE*   UROBILINOGEN 0.2   BILIRUBINUR Negative   BLOODU LARGE*   GLUCOSEU Negative       Imaging:  I have reviewed radiology images personally. XR CHEST PORTABLE   Final Result   Supportive tubing projects in normal position. Low lung volumes. Right basilar opacity, indeterminate for atelectasis or pneumonia/aspiration   pneumonitis. XR CHEST PORTABLE   Final Result   Endotracheal tube tip is 3 cm above the ade. Enteric tube tip is within   the stomach with side port just below the gastroesophageal junction. XR ABDOMEN (KUB) (SINGLE AP VIEW)   Final Result   Endotracheal tube tip is 3 cm above the ade. Enteric tube tip is within   the stomach with side port just below the gastroesophageal junction.            XR HAND RIGHT (2 VIEWS)    Result Date: 10/12/2022  EXAMINATION: 3 XRAY VIEWS OF THE RIGHT HAND 10/12/2022 3:08 pm COMPARISON: None HISTORY: ORDERING SYSTEM PROVIDED HISTORY: 2nd digit pain/edema TECHNOLOGIST PROVIDED HISTORY: Reason for exam:->2nd digit pain/edema Reason for Exam: 2nd digit pain, edema FINDINGS: There is severe joint space narrowing throughout the digits with prominent osteophytes and mild deformity of the digits. No acute fracture or dislocation is seen. There is a well corticated bony fragment along the styloid process of the ulna. The carpal bones are intact. There is moderate narrowing along the base of the thumb. The bones are osteopenic. No erosions are seen and the soft tissues are unremarkable. There is mild soft tissue swelling throughout the digits. 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 No acute bony abnormality is seen. Old unfused fracture along the styloid process of the ulna. XR ABDOMEN (KUB) (SINGLE AP VIEW)    Result Date: 10/16/2022  EXAMINATION: ONE XRAY VIEW OF THE CHEST; ONE SUPINE XRAY VIEW(S) OF THE ABDOMEN 10/16/2022 4:28 pm COMPARISON: 10/15/2022 HISTORY: Patient intubated. FINDINGS: Patient is rotated. Endotracheal tube tip is approximately 3 cm above the ade. Enteric tube is within the stomach with side port just below the gastroesophageal junction. Additional leads are seen overlying the chest. Stable cardiomediastinal contours. Stable appearance of the lungs. No pneumothorax. Endotracheal tube tip is 3 cm above the ade. Enteric tube tip is within the stomach with side port just below the gastroesophageal junction. XR CHEST PORTABLE    Result Date: 10/17/2022  EXAMINATION: ONE XRAY VIEW OF THE CHEST 10/17/2022 5:46 am COMPARISON: 10/16/2022 HISTORY: ORDERING SYSTEM PROVIDED HISTORY: RF TECHNOLOGIST PROVIDED HISTORY: Reason for exam:->RF Reason for Exam: resp failure FINDINGS: Endotracheal tube terminates 2.5 cm cephalad to the ade enteric tube has a normal course, extending below the diaphragm.   Right jugular pacer device projects to the right tricuspid region, perhaps in the ventricle. Right venous catheter terminates over the mid subclavian vein. The cardiac silhouette is within normal limits. Lung volumes are low. There remains patchy opacity in the right lung base. No pneumothorax is identified. Supportive tubing projects in normal position. Low lung volumes. Right basilar opacity, indeterminate for atelectasis or pneumonia/aspiration pneumonitis. XR CHEST PORTABLE    Result Date: 10/16/2022  EXAMINATION: ONE XRAY VIEW OF THE CHEST; ONE SUPINE XRAY VIEW(S) OF THE ABDOMEN 10/16/2022 4:28 pm COMPARISON: 10/15/2022 HISTORY: Patient intubated. FINDINGS: Patient is rotated. Endotracheal tube tip is approximately 3 cm above the ade. Enteric tube is within the stomach with side port just below the gastroesophageal junction. Additional leads are seen overlying the chest. Stable cardiomediastinal contours. Stable appearance of the lungs. No pneumothorax. Endotracheal tube tip is 3 cm above the ade. Enteric tube tip is within the stomach with side port just below the gastroesophageal junction. XR CHEST PORTABLE    Result Date: 10/15/2022  EXAMINATION: ONE XRAY VIEW OF THE CHEST 10/15/2022 7:54 pm COMPARISON: None. HISTORY: ORDERING SYSTEM PROVIDED HISTORY: bradycardia TECHNOLOGIST PROVIDED HISTORY: Reason for exam:->bradycardia Reason for Exam: bradycardia FINDINGS: A portable upright frontal view chest radiograph was obtained. The heart is enlarged. The mediastinal contour and pleural spaces are otherwise within normal limits. The pulmonary vascular pattern is increased. There is no focal consolidation or pneumothorax. No acute thoracic osseous abnormality. Moderate pulmonary vascular congestive change. Minimal superimposed right upper lobe atelectasis. Cardiomegaly. No significant pleural effusion.              Assessment:  Principal Problem:    Symptomatic bradycardia  Active Problems: Schizoaffective disorder (HCC)    Paroxysmal atrial fibrillation (HCC)    Complete heart block (HCC)    Hypotension    Systolic heart failure (HCC)    Hyperkalemia    AUSTIN (acute kidney injury) ([address]    Hypoglycemia    Hypothermia    Hyponatremia    Pulmonary infiltrates    Shock circulatory (HCC)    Normocytic hypochromic anemia    Observed seizure-like activity (HCC)    Acute respiratory failure with hypoxia (HCC)    Pyuria    Aspiration into airway    Cardiomyopathy ([address]  Resolved Problems:    * No resolved hospital problems.  *          Plan:   Ventilatory support to keep saturation between 90 and 94% only  Ventilatory settings and waveforms reviewed  Ventilatory changes made  Pulmonary toilet  Patient appears to have aspiration into the airways along with pulm infiltrates and mucous plugging- will do a bronchoscopy for diagnostic and therapeutic purposes  Patient was empirically started on cefepime  Titration of antimicrobials as per the patient's clinical status and cultures  IV sedation to maintain patient ventilator synchrony  D10W will continue for now we will reassess after patient's enteral feeding has been started  Status post temporary transvenous pacemaker  Monitor cardiac rhythm and hemodynamics closely  EP follow-up  IV dopamine to continue both for heart rate and also to maintain hemodynamics more than 65 mmHg  Patient is temperature to be trended and patient has hypothermia patient may require Miles hugger  Patient also has been given some calcium gluconate along with that patient got interventions for hyperkalemia  Patient's potassium to be trended better controlled  Enteral feeds as per metabolic support  Random cortisol level was   Patient recent echocardiogram from care everywhere was reviewed  Patient also is having AUSTIN like picture which needs to be trended  Monitor I/O  and BMP  Correct electrolytes on whenever necessary basis  Cardiology follow-up  Monitor for any worsening hypoglycemia  PUD and DVT prophylaxis      Patient's clinical status remains precarious and critical with potential for further decompensation and needs to monitor closely in the ICU    Case discussed with ICU team      Critical care time from the patient was 35 minutes exclusive of any procedures        Electronically signed by:  Pina Kowalski MD    10/17/2022    8:34 PM.

## 2022-10-17 NOTE — CARE COORDINATION
CASE MANAGEMENT INITIAL ASSESSMENT      Reviewed chart and completed assessment with patient:currently sedated on Vent. Spoke with sister/POGUILLERMO Llamas at bedside   Explained Case Management role/services. Primary contact information:as below. Muriel will bring in AD's to copy    Health Care Decision Maker :   Primary Decision Maker: Muriel Fernandez [employer] [facility]) - Brother/Sister - [phone]          Can this person be reached and be able to respond quickly, such as within a few minutes or hours? Yes       Admit date/status:10/16/22 IPA  Diagnosis:Bradycardia   Is this a Readmission?:  No    Transfer from [place] [address]. Medicare secondary   primary (100% connected)  Precert required for SNF: Yes       3 night stay required: No    Living arrangements, Adls, care needs, prior to admission:     [facility] at home:  Walker__Cane__RTS__ BSC__Shower Chair__  02__ HHN__ CPAP__  BiPap__  Hospital Bed__ W/C___ Other_____     Patient has a h/o schizoaffective disorder. He lives in [place], [place]. He receives most of his medical care through the [facility] in [place]. He was initially admitted to [facility]  in [place], [place] for hallucinations and impulsive behavior. He was there for ten days then transferred to the [facility] geriatric inpatient psychiatry unit on 10/11. Transferred to [facility] 10/15 for cardiology needs. Muriel states that Saran lives in one story condo with his brother Kimo Holguin, who is MR Eliana and several other family members also live within a few houses, and multiple famly members assist as needed. He is 100% connected in [facility]. - this is primary insurance with [facility] Medicare as secondary. [facility]aw[facility] Bonner is his primary office. Services in the home and/or outpatient, prior to admission:none    [facility]    Medications:  Prescription coverage?  Yes Will pt require financial assistance with medications  No Transportation needs: family      PT/OT recs:not yet ordered    Hospital Exemption Notification (HEN):needed if sNF    Barriers to discharge:none    Plan/comments:TBD - currently sedated on Vent     ECOC on chart for MD signature       Carrington Chávez RN

## 2022-10-17 NOTE — PROGRESS NOTES
10/17/22 1527 10/17/22 1533   Patient Observation   Heart Rate 82  --    Resp 21  --    SpO2 99 %  --    Breath Sounds   Right Upper Lobe Diminished  --    Right Middle Lobe Diminished  --    Right Lower Lobe Diminished  --    Left Upper Lobe Diminished  --    Left Lower Lobe Diminished  --    Vent Information   Vent Mode AC/PRVC (S)  CPAP/PS  (Attempted SBT 10/5 50%, Pt went apenic. Pt placed back on previous settings.)   Ventilator Settings   FiO2  50 %  --    Insp Time (sec) 1.15 sec  --    Resp Rate (Set) 15 bmp  --    PEEP/CPAP (cmH2O) 5  --    Vt (Set, mL) 500 mL  --    Vent Patient Data (Readings)   Vt (Measured) 509 mL  --    Peak Inspiratory Pressure (cmH2O) 18 cmH2O  --    Rate Measured 15 br/min  --    Minute Volume (L/min) 7.65 Liters  --    Mean Airway Pressure (cmH2O) 8.69 cmH20  --    I:E Ratio 1:2.50  --    I Time/ I Time % 1.15 s  --    Vent Alarm Settings   High Pressure (cmH2O) 40 cmH2O  --    Low Minute Volume (lpm) 2 L/min  --    High Minute Volume (lpm) 20 L/min  --    Low Exhaled Vt (ml) 200 mL  --    RR High (bpm) 40 br/min  --    Apnea (secs) 20 secs  --    Additional Respiratoray Assessments   Humidification Source HME  --    Ambu Bag With Mask At Bedside Yes  --    Airway Clearance   Suction ET Tube  --    Suction Device Inline suction catheter  --    Sputum Method Obtained Endotracheal  --    Sputum Amount Large  --    Sputum Color/Odor Tan  --    Sputum Consistency Thick  --    ETT (adult)   Placement Date/Time: 10/16/22 1630   Present on Admission/Arrival: No  Placed By: Licensed provider  Placement Verified By: Colorimetric ETCO2 device; Chest X-ray; Auscultation  Preoxygenation: Yes  Mask Ventilation: Ventilated by mask (1)  Technique: D...    Secured At (S)  22 cm  (Retracted 2cm per Dr. Augusto Biggs.)  --    Measured From Lips  --    ETT Placement (S)  [address] By Commercial tube rodriguez  --    Site Assessment Dry  --    Cuff Pressure 30 cm H2O  --

## 2022-10-17 NOTE — PROGRESS NOTES
10/17/22 1209   Patient Observation   Heart Rate 96   Resp 15   SpO2 98 %   Breath Sounds   Right Upper Lobe Clear;Diminished   Right Middle Lobe Diminished   Right Lower Lobe Diminished   Left Upper Lobe Clear;Diminished   Left Lower Lobe Diminished   Vent Information   Vent Mode AC/PRVC   Ventilator Settings   FiO2  40 %   Insp Time (sec) 1.15 sec   Resp Rate (Set) 15 bmp   PEEP/CPAP (cmH2O) 5   Vt (Set, mL) 500 mL   Vent Patient Data (Readings)   Vt (Measured) 513 mL   Peak Inspiratory Pressure (cmH2O) 17 cmH2O   Rate Measured 15 br/min   Minute Volume (L/min) 7.7 Liters   Mean Airway Pressure (cmH2O) 8.6 cmH20   I:E Ratio 1:2.50   I Time/ I Time % 1.15 s   Vent Alarm Settings   High Pressure (cmH2O) 40 cmH2O   Low Minute Volume (lpm) 2 L/min   High Minute Volume (lpm) 20 L/min   Low Exhaled Vt (ml) 200 mL   RR High (bpm) 40 br/min   Apnea (secs) 20 secs   Additional Respiratoray Assessments   Humidification Source HME   Ambu Bag With Mask At Bedside Yes   Airway Clearance   Suction ET Tube   Subglottic Suction Done No   Suction Device Inline suction catheter   Sputum Method Obtained Endotracheal   Sputum Amount Small   Sputum Color/Odor Yellow; Tan   Sputum Consistency Thick   ETT (adult)   Placement Date/Time: 10/16/22 1630   Present on Admission/Arrival: No  Placed By: Licensed provider  Placement Verified By: Colorimetric ETCO2 device; Chest X-ray; Auscultation  Preoxygenation: Yes  Mask Ventilation: Ventilated by mask (1)  Technique: D...    Secured At 24 cm   Measured From Lips   ETT Placement (S)  Left   Secured By Commercial tube rodriguez   Site Assessment Cool;Dry   Cuff Pressure 30 cm H2O

## 2022-10-17 NOTE — CONSULTS
Comprehensive Nutrition Assessment    Type and Reason for Visit:  Initial, Consult    Nutrition Recommendations/Plan:   Recommend TF initiation. Order: \"Diet: Tube feed continuous/ NPO\". Initiate Nepro (Renal formula) at 10 mL/hr and as tolerated, increase by 10 mL/hr q 4 hours until goal of 35 mL/hr is met via N/G. Recommend 60 mL H20 flush q 4 hours. Monitor IVF infusion, Na labs and need for adjustments in water flush  Recommend 1 Bottle Proteinex P2Go daily via syringe. Flush with 30 mL H20 before and after  Monitor TF tolerance (abd distention, bowel habits, N/V, cramping)  Check TG while on diprivan infusion  Monitor nutrition adequacy, pertinent labs, bowel habits, wt changes, and clinical progress. Malnutrition Assessment:  Malnutrition Status: At risk for malnutrition (Comment) (10/17/22 1051)    Context:  Acute Illness       Nutrition Assessment:    Initial/Consult: Pt admitted w/ symptomatic bradycardia. PMHx of CHF, CAD, Afib, CKD, and HTN. New vent, on AC w/ FiO2 40%, PEEP 5. +Dopamine. Sedated on fentanyl and versed. Hypoglycemic upon admission. No significant weight loss noted in EMR. Consulted for tube feeding orders and management. Likely to start TF today. Will monitor. Nutrition Related Findings:    +NG. Na 132. Mg 2.5. No BM recorded. BG 73-99 mg/dL x 24 hrs. Generalized edema.  Wound Type: None       Current Nutrition Intake & Therapies:    Average Meal Intake: NPO  Average Supplements Intake: NPO  ADULT TUBE FEEDING; Nasogastric; Standard with Fiber; Continuous; 30; No; 30; Q 8 hours  Current Tube Feeding (TF) Orders:  Feeding Route: Nasogastric  Formula: Renal Formula  Schedule: Continuous  Additives/Modulars: Protein  Goal TF & Flush Orders Provides: Nepro Renal formula at goal rate of 35 mL/hr x 20 hrs to provide 700 mL TV, 1239 kcals, 57g protein, 509 mL FV. +1 bottle proteinex daily to provide additional 104 kcals and 26g pro. +60 mL flushes q 4 hrs. +30 mL flushes before and after proteinex administration. Anthropometric Measures:  Height: 5' 8\" (172.7 cm)  Ideal Body Weight (IBW): 154 lbs (70 kg)       Current Body Weight: 203 lb 4.2 oz (92.2 kg), 132 % IBW. Weight Source: Bed Scale (10/17/22)  Current BMI (kg/m2): 30.9        Weight Adjustment For: No Adjustment                 BMI Categories: Obese Class 1 (BMI 30.0-34. 9)    Estimated Daily Nutrient Needs:  Energy Requirements Based On: Kcal/kg (12-15 kcal/kg)  Weight Used for Energy Requirements: Current  Energy (kcal/day): 1570-0735 kcals  Weight Used for Protein Requirements: Ideal  Protein (g/day):  g  Method Used for Fluid Requirements: 1 ml/kcal  Fluid (ml/day): 0719-4729 mL    Nutrition Diagnosis:   Inadequate oral intake related to impaired respiratory function, cardiac dysfunction as evidenced by NPO or clear liquid status due to medical condition, intubation, nutrition support - enteral nutrition    Nutrition Interventions:   Food and/or Nutrient Delivery: Start Tube Feeding  Nutrition Education/Counseling: No recommendation at this time  Coordination of Nutrition Care: Continue to monitor while inpatient, Interdisciplinary Rounds       Goals:     Goals: Initiate nutrition support, within 2 days       Nutrition Monitoring and Evaluation:   Behavioral-Environmental Outcomes: None Identified  Food/Nutrient Intake Outcomes: Enteral Nutrition Intake/Tolerance  Physical Signs/Symptoms Outcomes: Biochemical Data, Nutrition Focused Physical Findings, Weight, Hemodynamic Status    Discharge Planning:     Too soon to determine     [address]: Office: [phone]; [address] [place] [address] Road: [phone]

## 2022-10-17 NOTE — PROGRESS NOTES
10/17/22 0803 10/17/22 0808   Patient Observation   Heart Rate 90  --    Resp 15  --    SpO2 100 % 97 %   Breath Sounds   Right Upper Lobe Clear;Diminished  --    Right Middle Lobe Diminished  --    Right Lower Lobe Diminished  --    Left Upper Lobe Clear;Diminished  --    Left Lower Lobe Diminished  --    Vent Information   Vent Mode AC/PRVC  --    Ventilator Settings   FiO2  50 %  (Decreased to 40%) (S)  40 %   Insp Time (sec) 1.15 sec  --    Resp Rate (Set) 15 bmp  --    PEEP/CPAP (cmH2O) 5  --    Vt (Set, mL) 500 mL  --    Vent Patient Data (Readings)   Vt (Measured) 509 mL  --    Peak Inspiratory Pressure (cmH2O) 17 cmH2O  --    Rate Measured 15 br/min  --    Minute Volume (L/min) 7.62 Liters  --    Mean Airway Pressure (cmH2O) 8.6 cmH20  --    I:E Ratio 1:2.50  --    I Time/ I Time % 1.15 s  --    Vent Alarm Settings   High Pressure (cmH2O) 40 cmH2O  --    Low Minute Volume (lpm) 2 L/min  --    High Minute Volume (lpm) 20 L/min  --    Low Exhaled Vt (ml) 200 mL  --    RR High (bpm) 40 br/min  --    Apnea (secs) 20 secs  --    Additional Respiratoray Assessments   Humidification Source HME  --    Ambu Bag With Mask At Bedside Yes  --    Airway Clearance   Suction ET Tube  --    Suction Device Inline suction catheter  --    Sputum Method Obtained Endotracheal  --    Sputum Amount Small  --    Sputum Color/Odor Tan  --    Sputum Consistency Thick  --    ETT (adult)   Placement Date/Time: 10/16/22 1630   Present on Admission/Arrival: No  Placed By: Licensed provider  Placement Verified By: Colorimetric ETCO2 device; Chest X-ray; Auscultation  Preoxygenation: Yes  Mask Ventilation: Ventilated by mask (1)  Technique: D...    Secured At 24 cm  --    Measured From Lips  --    ETT Placement (S)  Right  --    Secured By Commercial tube rodriguez  --    Site Assessment Cool;Dry  --    Cuff Pressure 30 cm H2O  --

## 2022-10-17 NOTE — CONSULTS
Electrophysiology Consultation   Date: 10/17/2022  Admit Date:  10/15/2022  Reason for Consultation: Bradycardia. Consult Requesting Physician: Samson Foss MD     Chief Complaint   Patient presents with    Cardiac Arrest     Air care from [facility]     HPI:   Mr. Thea Marcos is a 78year old male  with a medical history significant for non-ischemic cardiomyopathy (declined ICD in the past), paroxysmal atrial fibrillation, hypertension, schizophrenia, and chronic LBBB, and premature ventricular contractions who presents from home with altered mental status. According to patient's sister he has declining over the last few months. He was recently transferred from a facility in [place] due to failure to thrive and the fact that his sister works at [facility] [employer]. While in the psychiatry berry, patient was improving when his olanzapine was restarted (didn't tolerate Abilify). During night time rounds, patient's vital signs were taken and he was found to be hypothermic with profound bradycardia. Patient was transferred to [facility] for further evaluation and care. Of note, goals of care unclear. Sister believes patient would not want an ICD however she's unclear if he'd be ok with pacemaker as he was tired of his chronic suffering. Patient intubated and sedated. Emergency Room/Hospital Course:  Patient was evaluated emergency room on 10/15/2022. His CMP was notable for mildly elevated ALT/AST, hyponatremia with a sodium of 127, and mild hyperglycemia. Patient CBC was notable for normocytic anemia. His chest radiograph showed moderate pulmonary vascular congestion and cardiomegaly. His EKG showed sinus bradycardia with complete heart block and slow junctional escape rhythm. Patient had a right IJ temporary pacemaker placed by Dr. Malini Napier. He was started on dopamine. He was found to be markedly hypothermic.     Current rhythm: Sinus rhythm with frequent PACs and first-degree AV block  Known history of atrial fibrillation: yes -paroxysmal  Valvular disease: No  Associated symptoms:  Unclear  Heart rate is  controlled  Previous cardioversion and/or ablation: no  History of CAD: No  History of sleep apnea: likely  History of ETOH abuse/drug abuse: No  History of thyroid disease: No  Elevated BNP: No  Left atrial size is  unknown    Past Medical History:   Diagnosis Date    Acute kidney injury ([facility]     Anemia     Hx of     Arthritis     Atrial fibrillation (HCC)     Atrial flutter ([facility]     converted to NSR, Poor candidate for anticoagulation. CAD (coronary artery disease)     Cardiomyopathy ([facility]     ? Nonishcemic, Echo 10/2014 LVEF 25-30%. Cellulitis of right upper extremity     CHF (congestive heart failure) ([facility])     systolic    CKD (chronic kidney disease)     Cr 2.3 10/2014, not on ACE/ARB due to CKD    Hypertension     Hypovolemia     PVC's (premature ventricular contractions)     secondary to hypokalemia    Schizophrenia (HCC)     hx of    Seizures ([facility])     Urinary tract infection         Past Surgical History:   Procedure Laterality Date    APPENDECTOMY      TURP         Allergies   Allergen Reactions    Penicillins        Social History:  Reviewed. reports that he has quit smoking. His smoking use included cigarettes. He has a 60.00 pack-year smoking history. He does not have any smokeless tobacco history on file. He reports that he does not drink alcohol and does not use drugs. Family History:  Reviewed. family history includes Arthritis in his mother; Heart Disease in his father. No premature CAD. Review of System:  Unable to give history due to sedation.     Physical Examination:  Vitals:    10/17/22 1300   BP: (!) 98/55   Pulse: 82   Resp: 18   Temp: 98.3 °F (36.8 °C)   SpO2: 98%        Intake/Output Summary (Last 24 hours) at 10/17/2022 1328  Last data filed at 10/17/2022 1300  Gross per 24 hour   Intake 1500 ml   Output 1315 ml   Net 185 ml     In: 1500 [I.V.:1450]  Out: 1715    Wt Readings from Last 3 Encounters:   10/17/22 203 lb 4.2 oz (92.2 kg)   10/15/22 180 lb (81.6 kg)   14 177 lb (80.3 kg)     Temp  Av.2 °F (36.8 °C)  Min: 98 °F (36.7 °C)  Max: 98.4 °F (36.9 °C)  Pulse  Av.9  Min: 73  Max: 100  BP  Min: 74/48  Max: 109/60  SpO2  Av.1 %  Min: 94 %  Max: 100 %  FiO2   Av.5 %  Min: 40 %  Max: 50 %    Telemetry: Sinus rhythm with first-degree heart block, and intermittent PACs and rare PVCs with rare ventricular pacing  Constitutional: Patient is sedated and intubated. Head: Normocephalic and atraumatic. Mouth/Throat: Lips appear moist. Oropharynx is clear and moist.  Eyes: Conjunctivae normal. EOM are normal.   Cardiovascular: Normal rate, regular rhythm. Normal S1&S2. Pulmonary/Chest: Bilateral respiratory sounds present. No respiratory accessory muscle use. No wheezes, No rhonchi. Abdominal: Soft. Normal bowel sounds present. No distension, No tenderness. No splenomegaly. No hernia. Musculoskeletal: No tenderness. Right upper extremity edema. Lymphadenopathy: Has no cervical adenopathy. Neurological: Patient intubated and sedated. Skin: Skin is warm and dry. No rash, lesions, ulcerations noted. Psychiatric: No anxiety nor agitation. Labs:  Reviewed.    Recent Labs     10/15/22  2200 10/16/22  0404 10/16/22  1200 10/17/22  0406   * 131* 134* 132*   K 5.4* 6.2* 5.6* 4.6   CL 95* 95* 96* 95*   CO2 27 27 27 27   PHOS 3.9 4.4  --  3.8   BUN 27* 30* 34* 33*   CREATININE 1.1 1.2 1.4* 1.4*     Recent Labs     10/15/22  2200 10/16/22  0403 10/17/22  0406   WBC 4.4 6.3 7.0   HGB 10.5* 11.3* 10.7*   HCT 33.2* 34.4* 32.5*   MCV 91.6 90.2 89.2   * 143 147     Lab Results   Component Value Date/Time    TROPONINI <0.01 10/15/2022 10:00 PM     No results found for: BNP  Lab Results   Component Value Date/Time    PROTIME 14.1 10/17/2022 04:06 AM    PROTIME 13.5 10/16/2022 04:03 AM    INR 1.10 10/17/2022 04:06 AM    INR 1.04 10/16/2022 04:03 AM     Lab Results   Component Value Date/Time    CHOL 109 10/11/2022 03:56 PM    HDL 61 10/11/2022 03:56 PM    TRIG 56 10/11/2022 03:56 PM       Diagnostic and imaging results reviewed. ECG: Sinus bradycardia with slow junctional escape. Echo: 07/05/2022  Francois Miranda MD - 07/05/2022   Formatting of this note might be different from the original.   IMPRESSION   Conclusions     * Left ventricular chamber dimension is normal.     * Left ventricular function is mildly reduced with an estimated ejection   fraction of 40-45%. * Left ventricular segmental wall motion is abnormal.     * Right ventricular systolic function is normal.     * Unable to estimate pulmonary arterial systolic pressure due to lack of   tricuspid regurgitation jet. * There is mild aortic valve regurgitation. * The aortic arch is mildly dilated. * The basal inferior wall, mid inferior wall, basal anterolateral wall, mid   anterolateral wall, basal inferolateral wall, and mid inferolateral wall are   hypokinetic. * All other walls appear normal.    Echo: 10/17/2022   Summary   Technically difficult study due to poor acoustic window and patient on vent. Difficult to assess left ventricular systolic function due to arrhythmia but   appears moderately reduced with an ejection fraction of 35% (better assessed   on definity images, however ectopy makes assessment of lv function   difficult)   Normal left ventricular size with mild concentric left ventricular   hypertrophy. Left ventricular diastolic filling pressure is normal    Nuclear Stress Test: 07/07/2022  Conclusions     * Mild heterogeneity of isotope uptake noted but not in a pattern consistent   with ischemia. * Ejection fraction is normal.     * No significant reversible defects. I independently reviewed the ECG and telemetry.     Scheduled Meds:   mupirocin   Nasal BID    chlorhexidine  15 mL Mouth/Throat BID carboxymethylcellulose PF  1 drop Both Eyes 6 times per day    cefepime  2,000 mg IntraVENous Q12H    levothyroxine  125 mcg Oral Daily    atorvastatin  20 mg Oral Nightly    docusate sodium  100 mg Oral BID    OLANZapine  20 mg Oral Nightly    heparin (porcine)  5,000 Units SubCUTAneous 3 times per day    pantoprazole  40 mg IntraVENous Daily    aspirin  81 mg Oral Daily     Continuous Infusions:   dextrose 50 mL/hr at 10/17/22 0737    fentaNYL 50 mcg/hr (10/17/22 0745)    midazolam 2 mg/hr (10/17/22 0455)    DOPamine 15 mcg/kg/min (10/17/22 1203)     PRN Meds:.potassium chloride **OR** potassium chloride, magnesium sulfate, sodium phosphate IVPB **OR** sodium phosphate IVPB **OR** sodium phosphate IVPB, ondansetron, acetaminophen **OR** acetaminophen, ziprasidone, melatonin, perflutren lipid microspheres     Assessment:   Patient Active Problem List    Diagnosis Date Noted    Complete heart block ([address] 10/16/2022    Hypotension 45/39/9740    Systolic heart failure ([address] 10/16/2022    LBBB (left bundle branch block) 10/16/2022    Non-ischemic cardiomyopathy ([address] 10/16/2022    Symptomatic bradycardia 10/16/2022    Hyperkalemia 10/16/2022    AUSTIN (acute kidney injury) ([address] 10/16/2022    Hypoglycemia 10/16/2022    Hypothermia 10/16/2022    Hyponatremia 10/16/2022    Pulmonary infiltrates 10/16/2022    Shock circulatory ([address] 10/16/2022    Normocytic hypochromic anemia 10/16/2022    Observed seizure-like activity ([address] 10/16/2022    Acute respiratory failure with hypoxia ([address] 10/16/2022    Dementia ([address] 10/15/2022    Hypercholesteremia 10/12/2022    New onset seizure ([address] 10/12/2022    Other specified hypothyroidism 10/12/2022    Paroxysmal atrial fibrillation ([address] 10/12/2022    Benign prostatic hyperplasia, presence of lower urinary tract symptoms unspecified 10/12/2022    Schizoaffective disorder ([facility] 10/11/2022    Long term current use of amiodarone 12/16/2014    Atrial flutter ([facility]     Cardiomyopathy ([facility] Schizophrenia ([address]     CKD (chronic kidney disease)     CHF (congestive heart failure) ([address]     PVC's (premature ventricular contractions)       Active Hospital Problems    Diagnosis Date Noted    Complete heart block ([address] [I44.2] 10/16/2022     Priority: Medium    Hypotension [I95.9] 10/16/2022     Priority: Medium    Systolic heart failure ([address] [I50.20] 10/16/2022     Priority: Medium    Symptomatic bradycardia [R00.1] 10/16/2022     Priority: Medium    Hyperkalemia [E87.5] 10/16/2022     Priority: Medium    AUSTIN (acute kidney injury) ([address] [N17.9] 10/16/2022     Priority: Medium    Hypoglycemia [E16.2] 10/16/2022     Priority: Medium    Hypothermia [T68. XXXA] 10/16/2022     Priority: Medium    Hyponatremia [E87.1] 10/16/2022     Priority: Medium    Pulmonary infiltrates [R91.8] 10/16/2022     Priority: Medium    Shock circulatory ([address] [R57.9] 10/16/2022     Priority: Medium    Normocytic hypochromic anemia [D50.9] 10/16/2022     Priority: Medium    Observed seizure-like activity ([address] [R56.9] 10/16/2022     Priority: Medium    Acute respiratory failure with hypoxia ([address] [J96.01] 10/16/2022     Priority: Medium    Paroxysmal atrial fibrillation ([address] [I48.0] 10/12/2022     Priority: Medium    Schizoaffective disorder ([address] [F25.9] 10/11/2022     Priority: Medium    Cardiomyopathy ([address] [I42.9]      Mr. Ashley Dunbar is a 78year old male  with a medical history significant for non-ischemic cardiomyopathy (declined ICD in the past), paroxysmal atrial fibrillation, hypertension, schizophrenia, and chronic LBBB, and premature ventricular contractions who presents from home with altered mental status. Problem List:  1. Complete heart block. 2. Paroxysmal atrial fibrillation. 3. Non-ischemic cardiomyopathy. 4. Hypotension. 5. Acute respiratory failure. Assessment and Plan:  1. Complete heart block.   Patient is a 72-year-old male  with a medical history significant for nonischemic cardiomyopathy with mild to moderate left ventricular dysfunction, bradycardia, hypertension, schizophrenia, paroxysmal atrial fibrillation, hypertension, and chronic left bundle branch block who presents from the [facility] with hypothermia, and complete heart block. Etiology is unclear but doubt ischemic. Patient currently is not using his temporary pace maker that was implanted by Dr. Linsey Pina very much however he continues to be on triple reviewed. His hypertension etiology is unclear. Infectious work-up negative thus far. Patient is on antipsychotics which could cause bradycardia but unlikely to cause complete heart block. He is on metoprolol. His heart block has resolved on dopamine. From EP standpoint patient most likely will need a beta-blocker for his cardiomyopathy however a lower dose may be more reasonable. Once his hypotension resolves we can try to put him back on low-dose metoprolol and see if he tolerates this. If he does then no pacemaker indicated if he does not that he will require a biventricular pacemaker if family agrees that this would be what he would want. - Wean dopamine for blood pressure as tolerated. - Hypotension and hypothermia work up per primary team.  - Once off dopamine then restart low dose metoprolol.  - We will continue to follow along with you. 2. Paroxysmal atrial fibrillation. Patient with a history of paroxysmal atrial fibrillation. His [id number] score is 4. He is not on oral anticoagulant therapy. Unclear if he has tachybradycardia syndrome at this point as he only has sinus rhythm with PACs now. Plan as per above. [place] [facility] [place]. - Plan as per above. 3. Non-ischemic cardiomyopathy.  - Plan as per above. - Restart GDMT once clinically appropriate. 4. Hypotension.  - Per primary team.    5. Acute respiratory failure. - Per pulmonology. - I spent more than 45 minutes on patient care.     may me to participate in the care of [facility] . If you have any questions/comments, please do not hesitate to contact us.     Madelyn Mcconnell MD  Cardiac Electrophysiology  [address] [facility]  [phone] [facility]

## 2022-10-17 NOTE — PROGRESS NOTES
[facility]   PROGRESS NOTE  [phone]      Attending Physician: Yoan Reyes MD  Reason for Consultation/Chief Complaint: Complete heart block    Subjective     Patient is currently in sinus rhythm with heart rate in 70-80s. Right IJ temporary transvenous pacemaker remains in place with backup sensing set to 60 bpm.    He remains on dopamine at 10 mcg/kg/min, but this can likely be weaned further based on current BP. The patient is currently encephalopathic and unable to provide any significant history. CURRENT Medications:  potassium chloride 20 mEq/50 mL IVPB (Central Line), PRN   Or  potassium chloride 10 mEq/100 mL IVPB (Peripheral Line), PRN  magnesium sulfate 1000 mg in dextrose 5% 100 mL IVPB, PRN  sodium phosphate 10 mmol in sodium chloride 0.9 % 250 mL IVPB, PRN   Or  sodium phosphate 15 mmol in sodium chloride 0.9 % 250 mL IVPB, PRN   Or  sodium phosphate 20 mmol in sodium chloride 0.9 % 500 mL IVPB, PRN  ondansetron (ZOFRAN) injection 4 mg, Q6H PRN  acetaminophen (TYLENOL) tablet 650 mg, Q4H PRN   Or  acetaminophen (TYLENOL) suppository 650 mg, Q4H PRN  ziprasidone (GEODON) injection 10 mg, Q12H PRN  levothyroxine (SYNTHROID) tablet 125 mcg, Daily  melatonin tablet 3 mg, Nightly PRN  atorvastatin (LIPITOR) tablet 20 mg, Nightly  docusate sodium (COLACE) capsule 100 mg, BID  OLANZapine (ZYPREXA) tablet 20 mg, Nightly  dextrose 10 % infusion, Continuous  fentaNYL (SUBLIMAZE) 1,000 mcg in sodium chloride 0.9% 100 mL infusion, Continuous  midazolam (VERSED) 100 mg in dextrose 5 % 100 mL infusion, Continuous  heparin (porcine) injection 5,000 Units, 3 times per day  pantoprazole (PROTONIX) injection 40 mg, Daily  DOPamine (INTROPIN) 400 mg in dextrose 5 % 250 mL infusion, Continuous  perflutren lipid microspheres (DEFINITY) injection 1.65 mg, ONCE PRN        Allergies:  Penicillins     Review of Systems:   Unobtainable as patient is currently encephalopathic.       Objective PHYSICAL EXAM:    Vitals:    10/16/22 1953   BP:    Pulse: 80   Resp: 20   Temp:    SpO2: 99%    Weight: 202 lb 13.2 oz (92 kg)      General: Encephalopathic adult male. HEENT: Normocephalic, atraumatic, non-icteric. RIJ TVP in place. Neck: No JVD. Heart: Bradycardic with irregular rhythm. Normal S1 and S2. No murmurs, gallops or rubs. Lungs: Normal respiratory effort. Breath sounds mildly diminished bilaterally with mild crackles present. Abdomen: Soft, non-tender. Normoactive bowel sounds. No masses or organomegaly. Skin: No rashes, wounds, or lesions. Pulses: 2+ and symmetric. Extremities: No clubbing, cyanosis, or edema. Neuro: Alert, but encephalopathic. Labs   CBC:   Lab Results   Component Value Date/Time    WBC 6.3 10/16/2022 04:03 AM    RBC 3.81 10/16/2022 04:03 AM    HGB 11.3 10/16/2022 04:03 AM    HCT 34.4 10/16/2022 04:03 AM    MCV 90.2 10/16/2022 04:03 AM    RDW 16.1 10/16/2022 04:03 AM     10/16/2022 04:03 AM     CMP:  Lab Results   Component Value Date/Time     10/16/2022 12:00 PM    K 5.6 10/16/2022 12:00 PM    K 6.2 10/16/2022 04:04 AM    CL 96 10/16/2022 12:00 PM    CO2 27 10/16/2022 12:00 PM    BUN 34 10/16/2022 12:00 PM    CREATININE 1.4 10/16/2022 12:00 PM    GFRAA 60 10/16/2022 12:00 PM    AGRATIO 1.4 10/16/2022 04:04 AM    LABGLOM 49 10/16/2022 12:00 PM    GLUCOSE 87 10/16/2022 12:00 PM    PROT 6.6 10/16/2022 04:04 AM    CALCIUM 8.9 10/16/2022 12:00 PM    BILITOT 0.3 10/16/2022 04:04 AM    ALKPHOS 115 10/16/2022 04:04 AM    AST 43 10/16/2022 04:04 AM    ALT 50 10/16/2022 04:04 AM     PT/INR:  No results found for: PTINR  HgBA1c:  Lab Results   Component Value Date    LABA1C 5.9 10/11/2022     Lab Results   Component Value Date    TROPONINI <0.01 10/15/2022         Cardiac Data     Echo:  TTE 7/5/22:  Conclusions     * Left ventricular chamber dimension is normal.     * Left ventricular function is mildly reduced with an estimated ejection   fraction of 40-45%. * Left ventricular segmental wall motion is abnormal.     * Right ventricular systolic function is normal.     * Unable to estimate pulmonary arterial systolic pressure due to lack of   tricuspid regurgitation jet. * There is mild aortic valve regurgitation. * The aortic arch is mildly dilated. * The basal inferior wall, mid inferior wall, basal anterolateral wall, mid   anterolateral wall, basal inferolateral wall, and mid inferolateral wall are   hypokinetic. * All other walls appear normal.     Stress Test:  Pharmacologic Nuclear SPECT Stress     Cath: N/A     Other Studies:   Chest x-ray 10/15/22: Moderate pulmonary vascular congestive change. Minimal superimposed right   upper lobe atelectasis. Cardiomegaly. No significant pleural effusion. Assessment:      1. Complete heart block  2. Hypotension  3. Chronic LV systolic heart failure (LVEF 40-45% on most recent TTE)  4. Cardiomyopathy previously attributed to non-ischemic etiology (No ischemia seen on nuclear SPECT stress test from 7/2022)  5. LBBB  6. Paroxysmal atrial fibrillation (not previously anticoagulated)  7. Schizophrenia  8. Hypothermia, improved  9. Acute kidney injury  10. Hyperkalemia      Plan:     1. Patient was transferred from [facility] yesterday for urgent transvenous pacemaker placement. His TVP remains in place with backup pacing set at 60 bpm but he is currently in normal sinus rhythm with a heart rate in the 70s-80s. It is possible that his initial bradycardia was exacerbated by a combination of hypothermia and possibly excessive beta-blockade while on metoprolol. He had mild hyperkalemia on admission and potassium level lester to 6.2 this morning, but is now 5.6. He received IV lasix, kayexalate, and calcium gluconate. 2. Will leave temporary transvenous pacemaker in place with backup pacing set to 60 bpm and ask EP to formally evaluate tomorrow.   If his bradycardia resolves with resolution of his hypothermia and withholding of his beta-blocker, he may not ultimately require a permanent pacemaker. He had reportedly previously refused to have placed, and his sister, who is his POA, had previously indicated that she is unsure if he would want to proceed with permanent pacemaker placement if ultimately indicated. 3. Will obtain TTE for complete assessment of cardiac structure and function. Pending results, he may require additional ischemic evaluation, but it is again unclear whether he would ultimately want to pursue additional work-up at this time. He has known schizophrenia and is currently encephalopathic and unable to participate in decision-making. 4. Can wean dopamine for goal MAP >65.  5.  He has a history of paroxysmal atrial fibrillation and anticoagulation was apparently previously deferred. CHADSVASc is at least 3. Would recommend starting aspirin 81 mg daily and pending clinical course, can arrange for additional evaluation to help determine a-fib burden and if found to be significant can re-evaluate benefits/risks of long-term anticoagulation. 6.  He has some mild crackles on exam and received 40 mg IV Lasix earlier this morning as above. We will need to continue to re-assess and can re-dose diuretic is needed  7. UDS has been ordered and would also recommend sending off infectious work-up. 8.  Continue to monitor on telemetry and repeat EKG for any rhythm changes. 9.  Avoid hyperkalemia and other electrolyte derangements as possible. Thank you for allowing us to participate in the care of [facility]. Please call me with any questions [phone]. Ester Mckenzie,   [place]   [phone] [facility]  [phone] [address] [place] [address]  10/16/2022 9:18 PM    I will address the patient's cardiac risk factors and adjusted pharmacologic treatment as needed. In addition, I have reinforced the need for patient directed risk factor modification.   All questions and concerns were addressed to the patient/family. Alternatives to my treatment were discussed. The note was completed using EMR. Every effort was made to ensure accuracy; however, inadvertent computerized transcription errors may be present.

## 2022-10-17 NOTE — PROGRESS NOTES
Physician Progress Note      Denise Burnett  CSN #:                  [id number]  :                       1945  ADMIT DATE:       10/15/2022 9:58 PM  [address] [place] DATE:  RESPONDING  PROVIDER #:        Amrita Boogie MD          QUERY TEXT:    Pt admitted with complete heart block and has encephalopathy documented. If   possible, please document in progress notes and discharge summary further   specificity regarding the type of encephalopathy:    The medical record reflects the following:  Risk Factors: Complete heart block, acute respiratory failure, schizoaffective   disorder  Clinical Indicators: AUSTIN, hyponatremia, per cardiology note 10/16 \"He has   known schizophrenia and is currently encephalopathic and unable to participate   in decision-making\". Per ED consult note \"Mental Status: He is lethargic\". Per nursing noes 10/16 \"Patient increasingly more lethargic- MD at bedside and   aware\". Treatment: IV fluids, temp pacemaker, intubation, serial labs, supportive care    Thank you,  [email or web address]  Options provided:  -- Metabolic encephalopathy  -- Anoxic encephalopathy  -- Toxic encephalopathy  -- Other - I will add my own diagnosis  -- Disagree - Not applicable / Not valid  -- Disagree - Clinically unable to determine / Unknown  -- Refer to Clinical Documentation Reviewer    PROVIDER RESPONSE TEXT:    This patient has metabolic encephalopathy. Query created by: Lory Castillo on 10/17/2022 11:57 AM      Electronically signed by:   Amrita Boogie MD 10/17/2022 2:47 PM

## 2022-10-18 PROBLEM — J15.20 STAPHYLOCOCCAL PNEUMONIA (HCC): Status: ACTIVE | Noted: 2022-10-18

## 2022-10-18 PROBLEM — A49.8 PROTEUS MIRABILIS INFECTION: Status: ACTIVE | Noted: 2022-10-18

## 2022-10-18 LAB
A/G RATIO: 1.2 (ref 1.1–2.2)
ALBUMIN SERPL-MCNC: 3 G/DL (ref 3.4–5)
ALP BLD-CCNC: 125 U/L (ref 40–129)
ALT SERPL-CCNC: 49 U/L (ref 10–40)
ANION GAP SERPL CALCULATED.3IONS-SCNC: 13 MMOL/L (ref 3–16)
AST SERPL-CCNC: 51 U/L (ref 15–37)
BASE EXCESS ARTERIAL: 2.6 MMOL/L (ref -3–3)
BASOPHILS ABSOLUTE: 0 K/UL (ref 0–0.2)
BASOPHILS RELATIVE PERCENT: 0.2 %
BILIRUB SERPL-MCNC: 0.4 MG/DL (ref 0–1)
BUN BLDV-MCNC: 33 MG/DL (ref 7–20)
CALCIUM SERPL-MCNC: 8.2 MG/DL (ref 8.3–10.6)
CARBOXYHEMOGLOBIN ARTERIAL: 0.3 % (ref 0–1.5)
CHLORIDE BLD-SCNC: 92 MMOL/L (ref 99–110)
CO2: 23 MMOL/L (ref 21–32)
CREAT SERPL-MCNC: 1.3 MG/DL (ref 0.8–1.3)
EOSINOPHILS ABSOLUTE: 0 K/UL (ref 0–0.6)
EOSINOPHILS RELATIVE PERCENT: 0.5 %
GFR SERPL CREATININE-BSD FRML MDRD: 57 ML/MIN/{1.73_M2}
GLUCOSE BLD-MCNC: 107 MG/DL (ref 70–99)
GLUCOSE BLD-MCNC: 115 MG/DL (ref 70–99)
GLUCOSE BLD-MCNC: 127 MG/DL (ref 70–99)
GLUCOSE BLD-MCNC: 91 MG/DL (ref 70–99)
GLUCOSE BLD-MCNC: 97 MG/DL (ref 70–99)
HCO3 ARTERIAL: 28.5 MMOL/L (ref 21–29)
HCT VFR BLD CALC: 33.3 % (ref 40.5–52.5)
HEMOGLOBIN, ART, EXTENDED: 11.9 G/DL (ref 13.5–17.5)
HEMOGLOBIN: 10.9 G/DL (ref 13.5–17.5)
INR BLD: 1.15 (ref 0.87–1.14)
LYMPHOCYTES ABSOLUTE: 0.5 K/UL (ref 1–5.1)
LYMPHOCYTES RELATIVE PERCENT: 6.5 %
MAGNESIUM: 2.2 MG/DL (ref 1.8–2.4)
MCH RBC QN AUTO: 29.4 PG (ref 26–34)
MCHC RBC AUTO-ENTMCNC: 32.8 G/DL (ref 31–36)
MCV RBC AUTO: 89.7 FL (ref 80–100)
METHEMOGLOBIN ARTERIAL: 0.8 %
MONOCYTES ABSOLUTE: 1 K/UL (ref 0–1.3)
MONOCYTES RELATIVE PERCENT: 14 %
NEUTROPHILS ABSOLUTE: 5.7 K/UL (ref 1.7–7.7)
NEUTROPHILS RELATIVE PERCENT: 78.8 %
O2 SAT, ARTERIAL: 98.2 %
O2 THERAPY: ABNORMAL
PCO2 ARTERIAL: 49.2 MMHG (ref 35–45)
PDW BLD-RTO: 15.9 % (ref 12.4–15.4)
PERFORMED ON: ABNORMAL
PERFORMED ON: ABNORMAL
PERFORMED ON: NORMAL
PERFORMED ON: NORMAL
PH ARTERIAL: 7.38 (ref 7.35–7.45)
PHOSPHORUS: 4.5 MG/DL (ref 2.5–4.9)
PLATELET # BLD: 125 K/UL (ref 135–450)
PMV BLD AUTO: 8.2 FL (ref 5–10.5)
PO2 ARTERIAL: 139.1 MMHG (ref 75–108)
POTASSIUM REFLEX MAGNESIUM: 4.4 MMOL/L (ref 3.5–5.1)
PROTHROMBIN TIME: 14.7 SEC (ref 11.7–14.5)
RBC # BLD: 3.71 M/UL (ref 4.2–5.9)
SODIUM BLD-SCNC: 128 MMOL/L (ref 136–145)
TCO2 ARTERIAL: 30 MMOL/L
TOTAL PROTEIN: 5.6 G/DL (ref 6.4–8.2)
WBC # BLD: 7.2 K/UL (ref 4–11)

## 2022-10-18 PROCEDURE — 36415 COLL VENOUS BLD VENIPUNCTURE: CPT

## 2022-10-18 PROCEDURE — 99291 CRITICAL CARE FIRST HOUR: CPT | Performed by: INTERNAL MEDICINE

## 2022-10-18 PROCEDURE — 99291 CRITICAL CARE FIRST HOUR: CPT | Performed by: NURSE PRACTITIONER

## 2022-10-18 PROCEDURE — 6370000000 HC RX 637 (ALT 250 FOR IP): Performed by: INTERNAL MEDICINE

## 2022-10-18 PROCEDURE — 6360000002 HC RX W HCPCS: Performed by: INTERNAL MEDICINE

## 2022-10-18 PROCEDURE — 82803 BLOOD GASES ANY COMBINATION: CPT

## 2022-10-18 PROCEDURE — 94761 N-INVAS EAR/PLS OXIMETRY MLT: CPT

## 2022-10-18 PROCEDURE — C9113 INJ PANTOPRAZOLE SODIUM, VIA: HCPCS | Performed by: INTERNAL MEDICINE

## 2022-10-18 PROCEDURE — 2580000003 HC RX 258: Performed by: INTERNAL MEDICINE

## 2022-10-18 PROCEDURE — 94003 VENT MGMT INPAT SUBQ DAY: CPT

## 2022-10-18 PROCEDURE — 84100 ASSAY OF PHOSPHORUS: CPT

## 2022-10-18 PROCEDURE — 2000000000 HC ICU R&B

## 2022-10-18 PROCEDURE — 93005 ELECTROCARDIOGRAM TRACING: CPT | Performed by: NURSE PRACTITIONER

## 2022-10-18 PROCEDURE — 2700000000 HC OXYGEN THERAPY PER DAY

## 2022-10-18 PROCEDURE — 6360000002 HC RX W HCPCS: Performed by: EMERGENCY MEDICINE

## 2022-10-18 PROCEDURE — 80053 COMPREHEN METABOLIC PANEL: CPT

## 2022-10-18 PROCEDURE — 85025 COMPLETE CBC W/AUTO DIFF WBC: CPT

## 2022-10-18 PROCEDURE — 83735 ASSAY OF MAGNESIUM: CPT

## 2022-10-18 PROCEDURE — 85610 PROTHROMBIN TIME: CPT

## 2022-10-18 RX ORDER — SENNA PLUS 8.6 MG/1
1 TABLET ORAL 2 TIMES DAILY
Status: DISCONTINUED | OUTPATIENT
Start: 2022-10-18 | End: 2022-10-21

## 2022-10-18 RX ADMIN — CARBOXYMETHYLCELLULOSE SODIUM 1 DROP: 10 GEL OPHTHALMIC at 12:10

## 2022-10-18 RX ADMIN — MUPIROCIN: 20 OINTMENT TOPICAL at 08:19

## 2022-10-18 RX ADMIN — CARBOXYMETHYLCELLULOSE SODIUM 1 DROP: 10 GEL OPHTHALMIC at 00:30

## 2022-10-18 RX ADMIN — PANTOPRAZOLE SODIUM 40 MG: 40 INJECTION, POWDER, FOR SOLUTION INTRAVENOUS at 08:19

## 2022-10-18 RX ADMIN — HEPARIN SODIUM 5000 UNITS: 5000 INJECTION INTRAVENOUS; SUBCUTANEOUS at 14:42

## 2022-10-18 RX ADMIN — CARBOXYMETHYLCELLULOSE SODIUM 1 DROP: 10 GEL OPHTHALMIC at 04:30

## 2022-10-18 RX ADMIN — CARBOXYMETHYLCELLULOSE SODIUM 1 DROP: 10 GEL OPHTHALMIC at 20:19

## 2022-10-18 RX ADMIN — SENNOSIDES 8.6 MG: 8.6 TABLET, COATED ORAL at 20:18

## 2022-10-18 RX ADMIN — DOPAMINE HYDROCHLORIDE IN DEXTROSE 15 MCG/KG/MIN: 1.6 INJECTION, SOLUTION INTRAVENOUS at 08:19

## 2022-10-18 RX ADMIN — HEPARIN SODIUM 5000 UNITS: 5000 INJECTION INTRAVENOUS; SUBCUTANEOUS at 06:04

## 2022-10-18 RX ADMIN — DOCUSATE SODIUM 100 MG: 50 LIQUID ORAL at 20:18

## 2022-10-18 RX ADMIN — Medication 15 ML: at 08:22

## 2022-10-18 RX ADMIN — SENNOSIDES 8.6 MG: 8.6 TABLET, COATED ORAL at 10:48

## 2022-10-18 RX ADMIN — LEVOTHYROXINE SODIUM 125 MCG: 0.12 TABLET ORAL at 05:26

## 2022-10-18 RX ADMIN — FENTANYL CITRATE 50 MCG/HR: 0.05 INJECTION, SOLUTION INTRAMUSCULAR; INTRAVENOUS at 06:13

## 2022-10-18 RX ADMIN — HEPARIN SODIUM 5000 UNITS: 5000 INJECTION INTRAVENOUS; SUBCUTANEOUS at 22:15

## 2022-10-18 RX ADMIN — ATORVASTATIN CALCIUM 20 MG: 10 TABLET, FILM COATED ORAL at 20:19

## 2022-10-18 RX ADMIN — ASPIRIN 81 MG 81 MG: 81 TABLET ORAL at 08:19

## 2022-10-18 RX ADMIN — DOPAMINE HYDROCHLORIDE IN DEXTROSE 15 MCG/KG/MIN: 1.6 INJECTION, SOLUTION INTRAVENOUS at 01:38

## 2022-10-18 RX ADMIN — CARBOXYMETHYLCELLULOSE SODIUM 1 DROP: 10 GEL OPHTHALMIC at 08:22

## 2022-10-18 RX ADMIN — CEFEPIME 2000 MG: 2 INJECTION, POWDER, FOR SOLUTION INTRAVENOUS at 17:46

## 2022-10-18 RX ADMIN — CEFEPIME 2000 MG: 2 INJECTION, POWDER, FOR SOLUTION INTRAVENOUS at 05:25

## 2022-10-18 RX ADMIN — DOCUSATE SODIUM 100 MG: 50 LIQUID ORAL at 08:19

## 2022-10-18 RX ADMIN — Medication 15 ML: at 20:18

## 2022-10-18 RX ADMIN — CARBOXYMETHYLCELLULOSE SODIUM 1 DROP: 10 GEL OPHTHALMIC at 17:06

## 2022-10-18 RX ADMIN — VANCOMYCIN HYDROCHLORIDE 1000 MG: 1 INJECTION, POWDER, LYOPHILIZED, FOR SOLUTION INTRAVENOUS at 20:56

## 2022-10-18 RX ADMIN — OLANZAPINE 20 MG: 5 TABLET, FILM COATED ORAL at 20:18

## 2022-10-18 RX ADMIN — DOPAMINE HYDROCHLORIDE IN DEXTROSE 15 MCG/KG/MIN: 1.6 INJECTION, SOLUTION INTRAVENOUS at 13:27

## 2022-10-18 RX ADMIN — MUPIROCIN: 20 OINTMENT TOPICAL at 20:18

## 2022-10-18 ASSESSMENT — PULMONARY FUNCTION TESTS
PIF_VALUE: 17
PIF_VALUE: 18
PIF_VALUE: 17
PIF_VALUE: 16
PIF_VALUE: 17
PIF_VALUE: 16
PIF_VALUE: 18
PIF_VALUE: 17
PIF_VALUE: 16
PIF_VALUE: 17
PIF_VALUE: 16
PIF_VALUE: 18
PIF_VALUE: 16
PIF_VALUE: 18
PIF_VALUE: 19
PIF_VALUE: 17
PIF_VALUE: 18
PIF_VALUE: 17
PIF_VALUE: 16
PIF_VALUE: 17
PIF_VALUE: 17
PIF_VALUE: 18
PIF_VALUE: 18
PIF_VALUE: 17
PIF_VALUE: 19
PIF_VALUE: 17
PIF_VALUE: 16
PIF_VALUE: 16
PIF_VALUE: 17
PIF_VALUE: 18
PIF_VALUE: 17

## 2022-10-18 ASSESSMENT — PAIN SCALES - GENERAL
PAINLEVEL_OUTOF10: 3
PAINLEVEL_OUTOF10: 0

## 2022-10-18 NOTE — PROGRESS NOTES
10/18/22 0734   Patient Observation   Heart Rate 88   Resp 15   SpO2 100 %   Vent Information   Vent Mode AC/PRVC   Ventilator Settings   FiO2  50 %   Resp Rate (Set) 15 bmp   PEEP/CPAP (cmH2O) 5   Vt (Set, mL) 500 mL   Vent Patient Data (Readings)   Vt (Measured) 500 mL   Peak Inspiratory Pressure (cmH2O) 17 cmH2O   Rate Measured 15 br/min   Minute Volume (L/min) 7.7 Liters   Mean Airway Pressure (cmH2O) 8.6 cmH20   I:E Ratio 1:2.50   Flow Sensitivity 3 L/min   I Time/ I Time % 1.15 s   Vent Alarm Settings   High Pressure (cmH2O) 40 cmH2O   Low Minute Volume (lpm) 2 L/min   High Minute Volume (lpm) 20 L/min   Low Exhaled Vt (ml) 200 mL   High Exhaled Vt (ml) 1000 mL   RR High (bpm) 40 br/min   Apnea (secs) 20 secs   Additional Respiratoray Assessments   Humidification Source HME   Circuit Condensation Not drained   Ambu Bag With Mask At Bedside Yes   ETT (adult)   Placement Date/Time: 10/16/22 1630   Present on Admission/Arrival: No  Placed By: Licensed provider  Placement Verified By: Colorimetric ETCO2 device; Chest X-ray; Auscultation  Preoxygenation: Yes  Mask Ventilation: Ventilated by mask (1)  Technique: D...    Secured At 24 cm   Measured From Lips   ETT Placement Center   Secured By Commercial tube rodriguez   Site Assessment Dry   Cuff Pressure 30 cm H2O

## 2022-10-18 NOTE — PROGRESS NOTES
Hospitalist Progress Note      PCP: [facility]    Date of Admission: 10/15/2022    Chief Complaint: respiratory failure s/p intubation. HPI   78 y.o. male. Patient has a h/o schizoaffective disorder. He lives in [place], [place]. He receives most of his medical care through the [place] in [place]. He was initially admitted to [facility] [address] in [place], [place] for hallucinations and impulsive behavior. He was there for ten days then transferred to the [facility] geriatric inpatient psychiatry unit on 10/11. Around 9pm on 10/15 a code blue was called because the patient briefly went unresponsive. He had been bradycardic all day  He had been taking metoprolol chronically but this had been held throughout the day because of his bradycardia. His nurse was trying to check his vitals again as it appeared he was breathing abnormally. While vital signs were being checked the patient lost consciousness briefly (less than a minute). He awoke but was markedly bradycardic. He was sent to the ER where he was found to be hypothermic, hypotensive, and bradycardic to the 20s in complete heart block. He was transferred to [place] for urgent transvenous pacing lead placement. Patient had a h/o chronic systolic HF, thought to be nonischemic, and paroxysmal afib. The patient required sedation during temporary pacing lead placement and then became agitated thereafter. He required restraints and was given IM geodon, so was not meaningfully interactive or able to provide any history. According to Dr. Layla Orr notes the patient had already been difficult to understand at baseline. Subjective: Pt remains intubated. on dopamine drip. On fentanyl drip.        Medications:  Reviewed    Infusion Medications    fentaNYL 25 mcg/hr (10/18/22 1319)    midazolam Stopped (10/18/22 1319)    DOPamine 15 mcg/kg/min (10/18/22 1327)     Scheduled Medications    senna  1 tablet Per NG tube BID    docusate  100 mg Per NG tube BID    mupirocin   Nasal BID    chlorhexidine  15 mL Mouth/Throat BID    carboxymethylcellulose PF  1 drop Both Eyes 6 times per day    cefepime  2,000 mg IntraVENous Q12H    levothyroxine  125 mcg Oral Daily    atorvastatin  20 mg Oral Nightly    OLANZapine  20 mg Oral Nightly    heparin (porcine)  5,000 Units SubCUTAneous 3 times per day    pantoprazole  40 mg IntraVENous Daily    aspirin  81 mg Oral Daily     PRN Meds: potassium chloride **OR** potassium chloride, magnesium sulfate, sodium phosphate IVPB **OR** sodium phosphate IVPB **OR** sodium phosphate IVPB, ondansetron, acetaminophen **OR** acetaminophen, ziprasidone, melatonin, perflutren lipid microspheres      Intake/Output Summary (Last 24 hours) at 10/18/2022 1347  Last data filed at 10/18/2022 0600  Gross per 24 hour   Intake 2623.11 ml   Output 1425 ml   Net 1198.11 ml       Physical Exam Performed:    BP (!) 108/59   Pulse 92   Temp (!) 96.2 °F (35.7 °C) (Bladder)   Resp 15   Ht 5' 8\" (1.727 m)   Wt 197 lb 15.6 oz (89.8 kg)   SpO2 100%   BMI 30.10 kg/m²     General appearance: intubated, sedated   HEENT: Pupils equal, round, and reactive to light. Conjunctivae/corneas clear. Neck: Supple, with full range of motion. No jugular venous distention. Trachea midline. Respiratory:  Normal respiratory effort. Clear to auscultation, bilaterally without Rales/Wheezes/Rhonchi. Cardiovascular: Regular rate and rhythm with normal S1/S2 without murmurs, rubs or gallops. Abdomen: Soft, non-tender, non-distended with normal bowel sounds. Musculoskeletal: No clubbing, cyanosis or edema bilaterally. Full range of motion without deformity. Skin: Skin color, texture, turgor normal.  No rashes or lesions. Neurologic:  Neurovascularly intact without any focal sensory/motor deficits.  Cranial nerves: II-XII intact, grossly non-focal.  Psychiatric: Alert and oriented, thought content appropriate, normal insight  Capillary Refill: Brisk, 3 seconds, normal   Peripheral Pulses: +2 palpable, equal bilaterally       Labs:   Recent Labs     10/16/22  0403 10/17/22  0406 10/18/22  0343   WBC 6.3 7.0 7.2   HGB 11.3* 10.7* 10.9*   HCT 34.4* 32.5* 33.3*    147 125*     Recent Labs     10/16/22  0404 10/16/22  1200 10/17/22  0406 10/18/22  0343   * 134* 132* 128*   K 6.2* 5.6* 4.6 4.4   CL 95* 96* 95* 92*   CO2 27 27 27 23   BUN 30* 34* 33* 33*   CREATININE 1.2 1.4* 1.4* 1.3   CALCIUM 8.9 8.9 8.6 8.2*   PHOS 4.4  --  3.8 4.5     Recent Labs     10/16/22  0404 10/17/22  0406 10/18/22  0343   AST 43* 30 51*   ALT 50* 37 49*   BILITOT 0.3 0.3 0.4   ALKPHOS 115 105 125     Recent Labs     10/16/22  0403 10/17/22  0406 10/18/22  0343   INR 1.04 1.10 1.15*     Recent Labs     10/15/22  2040 10/15/22  2200   TROPONINI <0.01 <0.01       Urinalysis:      Lab Results   Component Value Date/Time    NITRU POSITIVE 10/17/2022 04:15 AM    WBCUA 10-20 10/17/2022 04:15 AM    BACTERIA 2+ 10/17/2022 04:15 AM    RBCUA 21-50 10/17/2022 04:15 AM    BLOODU LARGE 10/17/2022 04:15 AM    SPECGRAV 1.010 10/17/2022 04:15 AM    GLUCOSEU Negative 10/17/2022 04:15 AM       Radiology:  XR CHEST PORTABLE   Final Result   Supportive tubing projects in normal position. Low lung volumes. Right basilar opacity, indeterminate for atelectasis or pneumonia/aspiration   pneumonitis. XR CHEST PORTABLE   Final Result   Endotracheal tube tip is 3 cm above the ade. Enteric tube tip is within   the stomach with side port just below the gastroesophageal junction. XR ABDOMEN (KUB) (SINGLE AP VIEW)   Final Result   Endotracheal tube tip is 3 cm above the ade. Enteric tube tip is within   the stomach with side port just below the gastroesophageal junction.                  Assessment/Plan:    Active Hospital Problems    Diagnosis     Pyuria [R82.81]      Priority: Medium    Aspiration into airway [T17.908A]      Priority: Medium Complete heart block (HCC) [I44.2]      Priority: Medium    Hypotension [I95.9]      Priority: Medium    Systolic heart failure (HCC) [I50.20]      Priority: Medium    Symptomatic bradycardia [R00.1]      Priority: Medium    Hyperkalemia [E87.5]      Priority: Medium    AUSTIN (acute kidney injury) ([facility] [N17.9]      Priority: Medium    Hypoglycemia [E16.2]      Priority: Medium    Hypothermia [T68. XXXA]      Priority: Medium    Hyponatremia [E87.1]      Priority: Medium    Pulmonary infiltrates [R91.8]      Priority: Medium    Shock circulatory ([facility] [R57.9]      Priority: Medium    Normocytic hypochromic anemia [D50.9]      Priority: Medium    Observed seizure-like activity (HCC) [R56.9]      Priority: Medium    Acute respiratory failure with hypoxia (HCC) [J96.01]      Priority: Medium    Paroxysmal atrial fibrillation (HCC) [I48.0]      Priority: Medium    Schizoaffective disorder (HCC) [F25.9]      Priority: Medium    Cardiomyopathy ([facility] [I42.9]    Bradycardia, Hypotension  -  Complete heart block now s/p urgent placement of a temporary transvenous pacing lead late 10/15. Rate is set to 60 bpm and there is 100% electromechanical capture at this time even though patient was agitated earlier and nearly pulled it out. He has received geodon and is now calm and resting.  -  Patient's BP has improved since pacing started but he is still requiring dopamine infusion currently running at 10 mcg/kg/min. Hopefully as his temperature normalizes his BP will rise as well. -  Cardiology assistance is appreciated    AUSTIN  Monitor GFR, avoid nephrotoxic agent     Hypothermia  -  Initial mild-moderate hypothermia w/ temperature as low as 86.7. Temperature is rising in response to active external warming measures which will continue.  -  TSH = 1.82 on 10/11 and 2.04 on 10/15. Acute on chronic Systolic HF  New EF 31%  -  Continue home lisinopril 10mg daily and furosemide 40mg daily once BP can tolerate. Schizoaffective D/O  -  Continue home olanzapine 20mg nightly, trazodone 50mg nightly.          DVT Prophylaxis: heparin   Diet: ADULT TUBE FEEDING; Nasogastric; Renal Formula; Continuous; 10; Yes; 10; Q 4 hours; 35; 30; Q 4 hours; Protein; 1 bottle of proteinex daily via syringe, 30 mL before and after administration  Code Status: Full Code  PT/OT Eval Status: yes    Dispo - Critically ill        Alberto Roth MD

## 2022-10-18 NOTE — PLAN OF CARE
Problem: Chronic Conditions and Co-morbidities  Goal: Patient's chronic conditions and co-morbidity symptoms are monitored and maintained or improved  Outcome: Progressing   Monitor and assess patient's chronic conditions and comorbid symptoms for stability, deterioration, or improvement   Collaborate with multidisciplinary team to address chronic and comorbid conditions and prevent exacerbation or deterioration   Update acute care plan with appropriate goals if chronic or comorbid symptoms are exacerbated and prevent overall improvement and discharge     Problem: Safety - Medical Restraint  Goal: Remains free of injury from restraints (Restraint for Interference with Medical Device)  Outcome: Progressing   Every 2 hours: Monitor safety, psychosocial status, comfort, nutrition and hydration   Inform patient/family regarding the reason for restraint   Evaluate the patient's condition at the time of restraint application   Determine that other, less restrictive measures have been tried or would not be effective before applying the restraint     Problem: Pain  Goal: Verbalizes/displays adequate comfort level or baseline comfort level  Outcome: Progressing     Problem: Neurosensory - Adult  Goal: Achieves stable or improved neurological status  Outcome: Progressing  Goal: Absence of seizures  Outcome: Progressing  Goal: Remains free of injury related to seizures activity  Outcome: Progressing  Goal: Achieves maximal functionality and self care  Outcome: Progressing     Problem: Respiratory - Adult  Goal: Achieves optimal ventilation and oxygenation  Outcome: Progressing     Problem: Cardiovascular - Adult  Goal: Maintains optimal cardiac output and hemodynamic stability  Outcome: Progressing  Goal: Absence of cardiac dysrhythmias or at baseline  Outcome: Progressing     Problem: Skin/Tissue Integrity - Adult  Goal: Skin integrity remains intact  Outcome: Progressing  Goal: Incisions, wounds, or drain sites healing without S/S of infection  Outcome: Progressing  Goal: Oral mucous membranes remain intact  Outcome: Progressing     Problem: Musculoskeletal - Adult  Goal: Return mobility to safest level of function  Outcome: Progressing  Return Mobility to Safest Level of Function: Assist with transfers and ambulation using safe patient handling equipment as needed  Goal: Maintain proper alignment of affected body part  Outcome: Progressing  Maintain proper alignment of affected body part: Support and protect limb and body alignment per provider's orders  Goal: Return ADL status to a safe level of function  Outcome: Progressing  Return ADL Status to a Safe Level of Function: Administer medication as ordered     Problem: Genitourinary - Adult  Goal: Absence of urinary retention  Outcome: Progressing  Goal: Urinary catheter remains patent  Outcome: Progressing     Problem: Hematologic - Adult  Goal: Maintains hematologic stability  Outcome: Progressing  Maintains hematologic stability: Assess for signs and symptoms of bleeding or hemorrhage     Problem: Nutrition Deficit:  Goal: Optimize nutritional status  Outcome: Progressing     Problem: Discharge Planning  Goal: Discharge to home or other facility with appropriate resources  Outcome: Progressing   Identify barriers to discharge with patient and caregiver   Identify discharge learning needs (meds, wound care, etc)

## 2022-10-18 NOTE — PROGRESS NOTES
10/18/22 0406   Patient Observation   Heart Rate 92   Resp 16   SpO2 99 %   Vent Information   Vent Mode AC/PRVC   Ventilator Settings   FiO2  50 %   Insp Time (sec) 1.15 sec   Resp Rate (Set) 15 bmp   PEEP/CPAP (cmH2O) 5   Vt (Set, mL) 500 mL   Vent Patient Data (Readings)   Vt (Measured) 501 mL   Peak Inspiratory Pressure (cmH2O) 17 cmH2O   Rate Measured 15 br/min   Minute Volume (L/min) 7.53 Liters   Mean Airway Pressure (cmH2O) 8.5 cmH20   I:E Ratio 1:2.50   I Time/ I Time % 1 s   Vent Alarm Settings   High Pressure (cmH2O) 40 cmH2O   Low Minute Volume (lpm) 2 L/min   Low Exhaled Vt (ml) 200 mL   RR High (bpm) 40 br/min   Additional Respiratoray Assessments   Humidification Source HME   ETT (adult)   Placement Date/Time: 10/16/22 1630   Present on Admission/Arrival: No  Placed By: Licensed provider  Placement Verified By: Colorimetric ETCO2 device; Chest X-ray; Auscultation  Preoxygenation: Yes  Mask Ventilation: Ventilated by mask (1)  Technique: D...    Secured At 24 cm   Measured From Lips   ETT Placement Right   Secured By Commercial tube rodriguez   Site Assessment Dry

## 2022-10-18 NOTE — DISCHARGE SUMMARY
Discharge Summary   Admit Date: 10/11/2022   Discharge Date:  10/15/2022    Condition at DC medically unstable  DC to the ED and later transferred to [facility] for treatment  Spent over 40 minutes with patient and staff on [address] with more than 50 % of time spent with patient discussing care  Final Dx: axis I:   Schizoaffective disorder ([address]     Axis 2: No diagnosis  Gail 3: See Medical History    And Present on Admission:   Schizoaffective disorder ([address]   Cardiomyopathy ([address]   CKD (chronic kidney disease)   Hypercholesteremia   Other specified hypothyroidism   Paroxysmal atrial fibrillation ([address]   New onset seizure ([address]   Benign prostatic hyperplasia, presence of lower urinary tract symptoms unspecified   Dementia (HCC)   Bradycardia  Axis 4: Other psychosocial and environmental problems  Axis 5:  On Admission: 31-40 impairment in reality testing At Discharge: 41-50 serious symptoms   All conditions on Axis 1 and Axis 2 and active problems on Axis 3 were treated while patient was hospitalized.  [facility] Problems    Diagnosis Date Noted    Dementia [facility]) [F03.90] 10/15/2022     Priority: Medium    Hypercholesteremia [E78.00] 10/12/2022     Priority: Medium    Other specified hypothyroidism [E03.8] 10/12/2022     Priority: Medium    Paroxysmal atrial fibrillation ([address] [I48.0] 10/12/2022     Priority: Medium    New onset seizure ([address] [R56.9] 10/12/2022     Priority: Medium    Benign prostatic hyperplasia, presence of lower urinary tract symptoms unspecified [N40.0] 10/12/2022     Priority: Medium    Schizoaffective disorder ([address] [F25.9] 10/11/2022     Priority: Medium    Cardiomyopathy ([address] [I42.9]     CKD (chronic kidney disease) [N18.9]    )   Condition on DC  Mood and affect are stable and pt is not suicidal   VITALS:  /69   Pulse 56   Temp 98 °F (36.7 °C) (Temporal)   Resp 18   Ht 5' 8\" (1.727 m)   Wt 180 lb 3.2 oz (81.7 kg)   SpO2 96%   BMI 27.40 kg/m²   Brief Summary Present Illness  CHIEF COMPLAINT:  Psychosis. HISTORY OF PRESENT ILLNESS:  The patient is a 79-year-old male who was  in a wheelchair. He currently lives in [place], [place], with his  brother in a condominium. His sister, Marco Antonio Huddleston, was present today when I  met with the patient and is very involved in his overall care and  history. Apparently, he was thought to have suicidal ideation after he  had taken too many of his Remeron to try to sleep. Sister Marco Antonio Huddleston stated  that he has never had a suicide attempt in his life. [facility] [address] [facility] [place], [place], for psychiatric admission. Report is that he was having  hallucinations and impulsive behaviors. He has also been using a Peterson  catheter, although he does not have a history of using Peterson catheter  all the time. He recently did have a UTI, which resolved according to  report. He was admitted on a voluntary basis with a history of  schizoaffective disorder, bipolar type. When he was hospitalized there,  he was inpatient for 10 days, and during that time, he was observed to  be talking to someone in the room who was not present. Apparently, he  has been having visual hallucinations with running water when he turns  of the TV. Apparently, he also was seeing a woman and people running  around outside in the woods. His sister stated that he has not had these delusions and hallucinations  from a long period of time while he was on Zyprexa, but they stopped  that in June, and he deteriorated. He does live in a condo with his  brother who has MRDD, and he apparently helps take care of him. When I met with the patient today, he was difficult to understand as his  speech was difficult to understand. His sister was able to interpret  some of his thoughts.   Apparently, he has been talking that when he is  in his massage chair and the massage is turned on that this will  influence the newscaster on television. He also states that he can make  players in a football game win the game. Hospital Course  Patient stabilized on meds and milieu treatment. Add Seroquel 25 mg at night for sleep purpo  See Dr. Ian Watson note below 10/15/2022  - Pt has been lashawn throughout the day on GS per his RN. +His cardiac meds including metop were held today. +Per the [address] RN patient does have dementia but was able to answer basic questions and interact with her prior to this event. He does tend to mumble a lot in an unintelligible voice at baseline.  - They were taking VS on pt. nurse noted the patient was breathing abnormally. She was trying to obtain vitals at the time of this described event. Dynamap read rate of 150's (may have been artifact bc saw baseline artifact vs ectopy when initially hooked up to EKG. However, actual vent rate was clearly in mid to low 20's w/ grossly regular rate. Appeared sinus.  - Patient's eyes then rolled back in his head and he became unresponsive briefly (less than 1 min). - Since the event he is awake and alert but does not follow commands. Only interaction I can get him to perform was when I placed my face in front of his he would track me with his eyes as I would move my head. He does retract all 4 to noxious stimuli and appears to be moving all 4. No visibly obvious cranial nerve deficit was appreciated. -EKG from today x2 (first on GS unit and 2nd at arrival to ED) as well as last previous below all reviewed by me. - BP was was nml. RR mild tachypnea at times. He was noted to be hypothermic w/ temp of 86.7 in ED.    - Decision made to emergently transfer pt down to ER for probable symptomatic severe bradycardia. - I accompanied pt to ER and personally gave verbal handoff to Dr. Rosalia Collado. I remained at bedside for initial evaluation with Dr. Rosalia Collado. -      Patient was discharged to ED   Labs:    No results displayed because visit has over 200 results. Admission on 10/15/2022, Discharged on 10/15/2022   Component Date Value Ref Range Status    Ventricular Rate 10/15/2022 26  BPM Final    Atrial Rate 10/15/2022 18  BPM Final    QRS Duration 10/15/2022 154  ms Final    Q-T Interval 10/15/2022 662  ms Final    QTc Calculation (Bazett) 10/15/2022 434  ms Final    R Axis 10/15/2022 185  degrees Final    T Axis 10/15/2022 159  degrees Final    Diagnosis 10/15/2022    Final                    Value:Undetermined rhythm ; likely high grade AV block possibly complete with ventricular escapeLeft bundle branch blockAbnormal ECGWhen compared with ECG of 15-OCT-2022 20:14, (unconfirmed)No significant change was foundConfirmed by ROSALIO Strauss MD ([id number]) on 10/16/2022 7:37:16 AM      Sodium 10/15/2022 127 (A)  136 - 145 mmol/L Final    Chloride 10/15/2022 95 (A)  99 - 110 mmol/L Final    CO2 10/15/2022 27  21 - 32 mmol/L Final    Anion Gap 10/15/2022 5  3 - 16 Final    Glucose 10/15/2022 130 (A)  70 - 99 mg/dL Final    BUN 10/15/2022 28 (A)  7 - 20 mg/dL Final    Creatinine 10/15/2022 1.2  0.8 - 1.3 mg/dL Final    GFR Non- 10/15/2022 59 (A)  >60 Final    Comment: >60 mL/min/1.73m2 EGFR, calc. for ages 25 and older using the  MDRD formula (not corrected for weight), is valid for stable  renal function. GFR  10/15/2022 >60  >60 Final    Comment: Chronic Kidney Disease: less than 60 ml/min/1.73 sq.m. Kidney Failure: less than 15 ml/min/1.73 sq.m. Results valid for patients 18 years and older. Calcium 10/15/2022 8.6  8.3 - 10.6 mg/dL Final    Total Protein 10/15/2022 7.1  6.4 - 8.2 g/dL Final    Albumin 10/15/2022 3.6  3.4 - 5.0 g/dL Final    Albumin/Globulin Ratio 10/15/2022 1.0 (A)  1.1 - 2.2 Final    Total Bilirubin 10/15/2022 <0.2  0.0 - 1.0 mg/dL Final    Comment: Specimen hemolysis has exceeded the interference as defined by Roche. Value may be falsely increased.  Suggest reorder and recollection if  clinically indicated. Alkaline Phosphatase 10/15/2022 109  40 - 129 U/L Final    Comment: Specimen hemolysis has exceeded the interference as defined by Roche. Value may be falsely decreased. Suggest reorder and recollection if  clinically indicated. ALT 10/15/2022 50 (A)  10 - 40 U/L Final    Comment: Specimen hemolysis has exceeded the interference as defined by Roche. Result may be affected. Suggest reorder and recollection if clinically  indicated. AST 10/15/2022 95 (A)  15 - 37 U/L Final    Comment: Specimen hemolysis has exceeded the interference as defined by Roche. Value may be falsely increased. Suggest reorder and recollection if  clinically indicated. Magnesium 10/15/2022 2.70 (A)  1.80 - 2.40 mg/dL Final    Troponin 10/15/2022 <0.01  <0.01 ng/mL Final    Comment: Specimen hemolysis has exceeded the interference as defined by Roche. Value may be falsely decreased. Suggest reorder and recollection if  clinically indicated. Methodology by Troponin T      Pro-BNP 10/15/2022 304  0 - 449 pg/mL Final    Comment: Methodology by NT-proBNP    An age-independent cutoff point of 300 pg/ml has a 98%  negative predictive value excluding acute heart failure. Values exceeding the age-related cutoff values (450 pg/mL if  age<50, 900 if 50-75 and 1800 if >75) has 90% sensitivity and  84% specificity for diagnosing acute HF. In patients with  renal compromise (eGFR<60) values greater than 1200pg/ml have  a diagnostic sensitivity and specificity of 89% and 72% for  acute HF. Digoxin Lvl 10/15/2022 <0.3 (A)  0.8 - 2.0 ng/mL Final    TSH 10/15/2022 2.04  0.27 - 4.20 uIU/mL Final    Rejected Test 10/15/2022 K   Final    Reason for Rejection 10/15/2022 see below   Final    Comment: Unable to perform testing; specimen grossly hemolyzed. To perform testing the specimen will need to be recollected.  Hemolyz     Admission on 10/11/2022, Discharged on 10/15/2022   Component Date Value Ref Range Status SARS-CoV-2 RNA, RT PCR 10/11/2022 NOT DETECTED  NOT DETECTED Final    Comment: Not Detected results do not preclude SARS-CoV-2 infection and  should not be used as the sole basis for patient management  decisions. Results must be combined with clinical observations,  patient history, and epidemiological information. Testing was performed using ELIZABETH AGUSTIN SARS-CoV-2 and Influenza A/B  nucleic acid assay. This test is a multiplex Real-Time Reverse  Transcriptase Polymerase Chain Reaction (RT-PCR)-based in vitro  diagnostic test intended for the qualitative detection of nucleic  acids from SARS-CoV-2, influenza A, and influenza B in nasopharyngeal  and nasal swab specimens for use under the FDAs Emergency Use  Authorization (EUA) only. Patient Fact Sheet:  FindDrives.pl  Provider Fact Sheet: FindDrives.pl  EUA: FindDrives.pl  IFU: FindDrives.pl    Methodology:  RT-PCR      INFLUENZA A 10/11/2022 NOT DETECTED  NOT DETECTED Final    INFLUENZA B 10/11/2022 NOT DETECTED  NOT DETECTED Final    TSH 10/11/2022 1.82  0.27 - 4.20 uIU/mL Final    Ventricular Rate 10/11/2022 53  BPM Final    Atrial Rate 10/11/2022 53  BPM Final    QRS Duration 10/11/2022 148  ms Final    Q-T Interval 10/11/2022 524  ms Final    QTc Calculation (Bazett) 10/11/2022 491  ms Final    R Axis 10/11/2022 3  degrees Final    T Axis 10/11/2022 83  degrees Final    Diagnosis 10/11/2022 Normal sinus rhythm with 1st degree A-V blockLow voltageLeft bundle branch blockAbnormal ECGNo previous ECGs availableConfirmed by ROSALIO Cameron MD ([id number]) on 10/12/2022 7:49:37 AM   Final    Cholesterol, Total 10/11/2022 109  0 - 199 mg/dL Final    Triglycerides 10/11/2022 56  0 - 150 mg/dL Final    HDL 10/11/2022 61 (A)  40 - 60 mg/dL Final    Comment: An HDL cholesterol less than 40 mg/dL is low and  constitutes a coronary heart disease risk factor.   An HDL cholesterol greater than 60 mg/dL is a  negative risk factor for coronary heart disease. LDL Calculated 10/11/2022 37  <100 mg/dL Final    VLDL Cholesterol Calculated 10/11/2022 11  Not Established mg/dL Final    Hemoglobin A1C 10/11/2022 5.9  See comment % Final    Comment: Comment:  Diagnosis of Diabetes: > or = 6.5%  Increased risk of diabetes (Prediabetes): 5.7-6.4%  Glycemic Control: Nonpregnant Adults: <7.0%                    Pregnant: <6.0%        eAG 10/11/2022 122.6  mg/dL Final    Sodium 10/12/2022 135 (A)  136 - 145 mmol/L Final    Potassium reflex Magnesium 10/12/2022 5.2 (A)  3.5 - 5.1 mmol/L Final    Chloride 10/12/2022 96 (A)  99 - 110 mmol/L Final    CO2 10/12/2022 28  21 - 32 mmol/L Final    Anion Gap 10/12/2022 11  3 - 16 Final    Glucose 10/12/2022 71  70 - 99 mg/dL Final    BUN 10/12/2022 30 (A)  7 - 20 mg/dL Final    Creatinine 10/12/2022 1.5 (A)  0.8 - 1.3 mg/dL Final    GFR Non- 10/12/2022 46 (A)  >60 Final    Comment: >60 mL/min/1.73m2 EGFR, calc. for ages 25 and older using the  MDRD formula (not corrected for weight), is valid for stable  renal function. GFR  10/12/2022 55 (A)  >60 Final    Comment: Chronic Kidney Disease: less than 60 ml/min/1.73 sq.m. Kidney Failure: less than 15 ml/min/1.73 sq.m. Results valid for patients 18 years and older.       Calcium 10/12/2022 8.7  8.3 - 10.6 mg/dL Final    Sodium 10/13/2022 138  136 - 145 mmol/L Final    Potassium reflex Magnesium 10/13/2022 4.3  3.5 - 5.1 mmol/L Final    Chloride 10/13/2022 100  99 - 110 mmol/L Final    CO2 10/13/2022 29  21 - 32 mmol/L Final    Anion Gap 10/13/2022 9  3 - 16 Final    Glucose 10/13/2022 78  70 - 99 mg/dL Final    BUN 10/13/2022 23 (A)  7 - 20 mg/dL Final    Creatinine 10/13/2022 0.9  0.8 - 1.3 mg/dL Final    GFR Non- 10/13/2022 >60  >60 Final    Comment: >60 mL/min/1.73m2 EGFR, calc. for ages 25 and older using the  MDRD formula (not corrected for weight), is valid for stable  renal function. GFR  10/13/2022 >60  >60 Final    Comment: Chronic Kidney Disease: less than 60 ml/min/1.73 sq.m. Kidney Failure: less than 15 ml/min/1.73 sq.m. Results valid for patients 18 years and older. Calcium 10/13/2022 8.8  8.3 - 10.6 mg/dL Final    Ventricular Rate 10/15/2022 28  BPM Final    Atrial Rate 10/15/2022 30  BPM Final    QRS Duration 10/15/2022 158  ms Final    Q-T Interval 10/15/2022 678  ms Final    QTc Calculation (Bazett) 10/15/2022 462  ms Final    R Axis 10/15/2022 -8  degrees Final    T Axis 10/15/2022 -37  degrees Final    Diagnosis 10/15/2022    Final                    Value:Undetermined rhythm ; high grade AV block possibly complete  with ventricular escape complexesLeft bundle branch blockAbnormal ECGWhen compared with ECG of 11-OCT-2022 19:29,HR decreaesed and AV block now presentVent.  rate has decreased BY  25 BPMConfirmed by PARESH MARAVILLA, Gaudencio Score ([id number]) on 10/16/2022 7:36:26 AM          Mental Status Exam at Discharge:  Level of consciousness:  awake  Appearance ill-appearing, in wheelchair, fair grooming and hygiene frailBehavior/Motor:  no abnormalities noted normal gait and station AIMS: 0  Attitude toward examiner:  cooperative, inattentive and good eye contact  Speech:  spontaneous, normal rate, normal volume and poorly articulated  Mood:  dysthymic  Affect:  mood congruent Anxiety: mild  Hallucinations: Absent  Thought processes: logical   Attention span, Concentration & Attention:  attention span and concentration were poor Thought content:   no evidence of delusions OCD: none    Insight: impaired insight and judgment Cognition:  oriented to person, place, and time  Fund of Knowledge:low  average  IQ:average Memory: intact  Suicide:  No specific plan to harm self  Sleep: sleeps through the night  Appetite: poor  Reassess Ella Risk:  no specific plan to harm self Pt has phone numbers to contact if suicidal thoughts recur and states pt will return to the hospital if suicidal feelings return. Hospital Routine Meds:     Hospital PRN Meds:    Discharge Meds:    Discharge Medication List as of 10/15/2022  8:18 PM             Details   amiodarone (CORDARONE) 200 MG tablet Take 1 tablet by mouth daily. , Disp-30 tablet, R-0Normal      docusate sodium (COLACE) 100 MG capsule Take 100 mg by mouth 2 times daily. aspirin 325 MG EC tablet Take 325 mg by mouth daily. ferrous sulfate 325 (65 FE) MG tablet Take 325 mg by mouth daily (with breakfast). tamsulosin (FLOMAX) 0.4 MG capsule Take 0.4 mg by mouth daily. simvastatin (ZOCOR) 40 MG tablet Take 20 mg by mouth nightly. levothyroxine (SYNTHROID) 125 MCG tablet Take 25 mcg by mouth Daily. ascorbic acid (VITAMIN C) 500 MG tablet Take 500 mg by mouth daily. Nutritional Supplements (ARGINAID EXTRA) LIQD by Does not apply route. potassium chloride (KLOR-CON M) 10 MEQ extended release tablet Take 20 mEq by mouth 2 times daily. furosemide (LASIX) 40 MG tablet Take 20 mg by mouth 2 times daily. metoprolol succinate (TOPROL XL) 200 MG extended release tablet Take 50 mg by mouth 2 times daily. Multiple Vitamins-Minerals (THERAPEUTIC MULTIVITAMIN-MINERALS) tablet Take 1 tablet by mouth 2 times daily. pantoprazole (PROTONIX) 40 MG tablet Take 20 mg by mouth 2 times daily. OLANZapine zydis (ZYPREXA) 20 MG disintegrating tablet Take 10 mg by mouth 2 times daily. melatonin 3 MG TABS tablet Take 3 mg by mouth as needed. diphenhydrAMINE-APAP, sleep, (TYLENOL PM EXTRA STRENGTH)  MG tablet Take 2 tablets by mouth nightly as needed for Sleep.      acetaminophen (TYLENOL) 325 MG tablet Take 650 mg by mouth every 6 hours as needed for Pain. benzonatate (TESSALON) 100 MG capsule Take 100 mg by mouth 3 times daily as needed for Cough.       divalproex (DEPAKOTE SPRINKLE) 125 MG capsule Take 125 mg by mouth 3 times daily. LORazepam (ATIVAN) 1 MG tablet Take 1 mg by mouth every 6 hours as needed for Anxiety.                  Disposition - Residence Another [address]      Follow Up:  See Discharge Instructions

## 2022-10-18 NOTE — PROGRESS NOTES
10/18/22 1526   Patient Observation   Heart Rate 89   Resp 15   SpO2 100 %   Breath Sounds   Right Upper Lobe Clear   Right Middle Lobe Clear   Right Lower Lobe Clear   Left Upper Lobe Clear   Left Lower Lobe Clear   Vent Information   Vent Mode AC/PRVC   Ventilator Settings   FiO2  40 %   Insp Time (sec) 1.15 sec   Resp Rate (Set) 15 bmp   PEEP/CPAP (cmH2O) 5   Vt (Set, mL) 500 mL   Vent Patient Data (Readings)   Vt Spont (mL) 489 mL   Vt (Measured) 507 mL   Peak Inspiratory Pressure (cmH2O) 17 cmH2O   Rate Measured 15 br/min   Minute Volume (L/min) 7.6 Liters   Mean Airway Pressure (cmH2O) 8.6 cmH20   I:E Ratio 1:2.50   Flow Sensitivity 3 L/min   I Time/ I Time % 1 s   Vent Alarm Settings   High Pressure (cmH2O) 40 cmH2O   Low Minute Volume (lpm) 2 L/min   RR High (bpm) 40 br/min   Additional Respiratoray Assessments   Humidification Source HME   Ambu Bag With Mask At Bedside Yes   Airway Clearance   Suction Oral   Suction Device Linnette   Sputum Method Obtained Oral   Sputum Amount Small   Sputum Color/Odor Clear   ETT (adult)   Placement Date/Time: 10/16/22 1630   Present on Admission/Arrival: No  Placed By: Licensed provider  Placement Verified By: Colorimetric ETCO2 device; Chest X-ray; Auscultation  Preoxygenation: Yes  Mask Ventilation: Ventilated by mask (1)  Technique: D...    Secured At 24 cm   Measured From Lips   ETT Placement Center   Secured By Commercial tube [email or web address]   Site Assessment Dry   Cuff Pressure 30 cm H2O

## 2022-10-18 NOTE — PROGRESS NOTES
INPATIENT PULMONARY CRITICAL CARE PROGRESS NOTE      Reason for visit    hypothermia, hypotensive and bradycardic status post urgent temporary transvenous pacemaker placement    SUBJECTIVE: Patient when seen this morning continues to be critically ill on mechanical vent support, patient underwent bronchoscopy and large amounts of thick mucous plugs and food particles were lavaged out, patient was on 50% oxygen on the ventilator when seen, patient is essentially afebrile, patient has sinus rhythm on the monitor and seen this morning, patient continues to be on IV dopamine infusion to maintain hemodynamics, be on IV sedation to maintain patient ventilator synchrony, patient has had good urine output overnight with cumulative fluid balance of +809 mL, patient's glycemic control was acceptable, no other pertinent review of system could be obtained because of patient clinical status         Physical Exam:  Blood pressure 108/60, pulse 95, temperature (!) 96.1 °F (35.6 °C), temperature source Bladder, resp. rate 15, height 5' 8\" (1.727 m), weight 197 lb 15.6 oz (89.8 kg), SpO2 100 %.'     Constitutional:  No acute distress. On mechanical vent support  HENT: Endotracheal tube present no thyromegaly. Eyes:  Conjunctivae are normal. Pupils equal, round, and reactive to light. No scleral icterus. Neck: . No tracheal deviation present. No obvious thyroid mass. Cardiovascular: Paced rhythm normal heart sounds. No right ventricular heave. No lower extremity edema. Pulmonary/Chest: No wheezes. Decreased rales. Chest wall is not dull to percussion. No accessory muscle usage or stridor. Abdominal: Soft. Bowel sounds present. No distension or hernia. No tenderness. Musculoskeletal: No cyanosis. No clubbing. No obvious joint deformity. Lymphadenopathy: No cervical or supraclavicular adenopathy. Skin: Skin is warm and dry. No rash or nodules on the exposed extremities.   No  Neurologic Intubated and sedated Results:  CBC:   Recent Labs     10/16/22  0403 10/17/22  0406 10/18/22  0343   WBC 6.3 7.0 7.2   HGB 11.3* 10.7* 10.9*   HCT 34.4* 32.5* 33.3*   MCV 90.2 89.2 89.7    147 125*       BMP:   Recent Labs     10/16/22  0404 10/16/22  1200 10/17/22  0406 10/18/22  0343   * 134* 132* 128*   K 6.2* 5.6* 4.6 4.4   CL 95* 96* 95* 92*   CO2 27 27 27 23   PHOS 4.4  --  3.8 4.5   BUN 30* 34* 33* 33*   CREATININE 1.2 1.4* 1.4* 1.3       LIVER PROFILE:   Recent Labs     10/16/22  0404 10/17/22  0406 10/18/22  0343   AST 43* 30 51*   ALT 50* 37 49*   BILITOT 0.3 0.3 0.4   ALKPHOS 115 105 125       PT/INR:   Recent Labs     10/16/22  0403 10/17/22  0406 10/18/22  0343   PROTIME 13.5 14.1 14.7*   INR 1.04 1.10 1.15*       APTT: No results for input(s): APTT in the last 72 hours. UA:  Recent Labs     10/17/22  0415   COLORU Yellow   PHUR >=9.0*   WBCUA 10-20*   RBCUA 21-50*   BACTERIA 2+*   CLARITYU SL CLOUDY*   SPECGRAV 1.010   LEUKOCYTESUR LARGE*   UROBILINOGEN 0.2   BILIRUBINUR Negative   BLOODU LARGE*   GLUCOSEU Negative         Imaging:  I have reviewed radiology images personally. XR CHEST PORTABLE   Final Result   Supportive tubing projects in normal position. Low lung volumes. Right basilar opacity, indeterminate for atelectasis or pneumonia/aspiration   pneumonitis. XR CHEST PORTABLE   Final Result   Endotracheal tube tip is 3 cm above the ade. Enteric tube tip is within   the stomach with side port just below the gastroesophageal junction. XR ABDOMEN (KUB) (SINGLE AP VIEW)   Final Result   Endotracheal tube tip is 3 cm above the ade. Enteric tube tip is within   the stomach with side port just below the gastroesophageal junction.            Organism Proteus mirabilis Abnormal  P    Urine Culture, Routine >100,000 CFU/ml   Sensitivity to follow          Gram Stain Result 4+ WBC's (Polymorphonuclear)   1+ WBC's (Mononuclear)   2+ Gram positive cocci   2+ Gram positive rods  [facility]   Organism Staphylococcus aureus Abnormal  P  [facility]   CULTURE, RESPIRATORY >100,000 CFU/mL   Sensitivity to follow  P    [address] [facility]           Narrative          Assessment:  Principal Problem:    Symptomatic bradycardia  Active Problems:    Schizoaffective disorder (HCC)    Paroxysmal atrial fibrillation (HCC)    Complete heart block (HCC)    Hypotension    Systolic heart failure (HCC)    Hyperkalemia    AUSTIN (acute kidney injury) (formerly [facility])    Hypoglycemia    Hypothermia    Hyponatremia    Pulmonary infiltrates    Shock circulatory (formerly [facility])    Normocytic hypochromic anemia    Observed seizure-like activity (formerly [facility])    Acute respiratory failure with hypoxia (formerly [facility])    Pyuria    Aspiration into airway    Cardiomyopathy ([facility]  Resolved Problems:    * No resolved hospital problems.  *          Plan:   Ventilatory support to keep saturation between 90 and 94% only  Ventilatory settings and waveforms reviewed  Ventilatory changes made  Pulmonary toilet  Status post bronchoscopy and the initial result shows patient to have staph pneumonia  Patient to be given 1 dose of vancomycin and reassessed depending on patient's final cultures whether it is MRSA or not  Patient was empirically started on cefepime  Titration of antimicrobials as per the patient's clinical status and cultures  IV sedation to maintain patient ventilator synchrony  Patient is off dextrose infusion  Status post temporary transvenous pacemaker  Monitor cardiac rhythm and hemodynamics closely  EP [id number] not want to place any permanent pacemaker at this time as told to nursing  IV dopamine being changed to IV Levophed maintain hemodynamics more than 65 mmHg  No need for any Miles hugger  Enteral feeds as per metabolic support  Random cortisol level was normal  Patient recent echocardiogram from care everywhere was reviewed  Monitor I/O  and BMP  Correct electrolytes on whenever necessary basis  Cardiology follow-up  Monitor for any worsening hypoglycemia  PUD and DVT prophylaxis      Patient's clinical status remains precarious and critical with potential for further decompensation and needs to monitor closely in the ICU    Case discussed with ICU team and family      Critical care time from the patient was 35 minutes exclusive of any procedures        Electronically signed by:  Dhruv Blanchard MD    10/18/2022    9:44 AM.

## 2022-10-18 NOTE — PLAN OF CARE
Patient's EF (Ejection Fraction) is less than 40%    Heart Failure Medications:  Diuretics[de-identified] None    (One of the following REQUIRED for EF </= 40%/SYSTOLIC FAILURE but MAY be used in EF% >40%/DIASTOLIC FAILURE)        ACE[de-identified] None        ARB[de-identified] None         ARNI[de-identified] None    (Beta Blockers)  NON- Evidenced Based Beta Blocker (for EF% >40%/DIASTOLIC FAILURE): None    Evidenced Based Beta Blocker::(REQUIRED for EF% <40%/SYSTOLIC FAILURE) None  . .................................................................................................................................................. Patient's weights and intake/output reviewed: Yes    Patient's Last Weight: 197 lbs obtained by bed scale. Difference of 6 lbs less than last documented weight. Intake/Output Summary (Last 24 hours) at 10/18/2022 1545  Last data filed at 10/18/2022 1445  Gross per 24 hour   Intake 2146.91 ml   Output 1340 ml   Net 806.91 ml       Education Booklet Provided: yes    Comorbidities Reviewed Yes    Patient has a past medical history of Acute kidney injury ([address] Anemia, Arthritis, Atrial fibrillation ([address] Atrial flutter ([address] CAD (coronary artery disease), Cardiomyopathy ([address] Cellulitis of right upper extremity, CHF (congestive heart failure) ([address] CKD (chronic kidney disease), Hypertension, Hypovolemia, PVC's (premature ventricular contractions), Schizophrenia ([address] Seizures ([address] and Urinary tract infection. >>For CHF and Comorbidity documentation on Education Time and Topics, please see Education Tab    Progressive Mobility Assessment:  What is this patient's Current Level of Mobility?: Requires Bed Rest  How was this patient Mobilized today?: Unable to Mobilize, ambulated 0 ft                 With Whom? N/A                 Level of Difficulty/Assistance: 2x Assist     Pt resting in bed at this time on  vent . Pt denies shortness of breath. Pt with pitting lower extremity edema.      Patient and/or Family's stated Goal of Care this Admission: reduce shortness of breath, increase activity tolerance, better understand heart failure and disease management, be more comfortable, and reduce lower extremity edema prior to discharge        :   Problem: Discharge Planning  Goal: Discharge to home or other facility with appropriate resources  Outcome: Not Progressing  Flowsheets (Taken 10/18/2022 0800)  Discharge to home or other facility with appropriate resources:   Identify barriers to discharge with patient and caregiver   Identify discharge learning needs (meds, wound care, etc)   Arrange for needed discharge resources and transportation as appropriate     Problem: Cardiovascular - Adult  Goal: Absence of cardiac dysrhythmias or at baseline  Outcome: Not Progressing  Flowsheets (Taken 10/18/2022 0800)  Absence of cardiac dysrhythmias or at baseline:   Monitor cardiac rate and rhythm   Assess for signs of decreased cardiac output   Administer antiarrhythmia medication and electrolyte replacement as ordered     Problem: Musculoskeletal - Adult  Goal: Return mobility to safest level of function  Outcome: Not Progressing     Problem: Chronic Conditions and Co-morbidities  Goal: Patient's chronic conditions and co-morbidity symptoms are monitored and maintained or improved  Outcome: Progressing  Flowsheets (Taken 10/18/2022 0800)  Care Plan - Patient's Chronic Conditions and Co-Morbidity Symptoms are Monitored and Maintained or Improved:   Collaborate with multidisciplinary team to address chronic and comorbid conditions and prevent exacerbation or deterioration   Monitor and assess patient's chronic conditions and comorbid symptoms for stability, deterioration, or improvement   Update acute care plan with appropriate goals if chronic or comorbid symptoms are exacerbated and prevent overall improvement and discharge     Problem: Safety - Medical Restraint  Goal: Remains free of injury from restraints (Restraint for Interference with Medical Device)  Description: INTERVENTIONS:  1. Determine that other, less restrictive measures have been tried or would not be effective before applying the restraint  2. Evaluate the patient's condition at the time of restraint application  3. Inform patient/family regarding the reason for restraint  4. Q2H: Monitor safety, psychosocial status, comfort, nutrition and hydration  Outcome: Progressing     Problem: Pain  Goal: Verbalizes/displays adequate comfort level or baseline comfort level  Outcome: Progressing     Problem: Neurosensory - Adult  Goal: Achieves stable or improved neurological status  Outcome: Progressing  Flowsheets (Taken 10/18/2022 0800)  Achieves stable or improved neurological status:   Assess for and report changes in neurological status   Initiate measures to prevent increased intracranial pressure   Monitor temperature, glucose, and sodium.  Initiate appropriate interventions as ordered   Maintain blood pressure and fluid volume within ordered parameters to optimize cerebral perfusion and minimize risk of hemorrhage  Goal: Absence of seizures  Outcome: Progressing  Goal: Remains free of injury related to seizures activity  Outcome: Progressing  Goal: Achieves maximal functionality and self care  Outcome: Progressing     Problem: Respiratory - Adult  Goal: Achieves optimal ventilation and oxygenation  Outcome: Progressing     Problem: Cardiovascular - Adult  Goal: Maintains optimal cardiac output and hemodynamic stability  Outcome: Progressing  Flowsheets (Taken 10/18/2022 0800)  Maintains optimal cardiac output and hemodynamic stability:   Monitor blood pressure and heart rate   Monitor urine output and notify Licensed Independent Practitioner for values outside of normal range   Assess for signs of decreased cardiac output   Administer fluid and/or volume expanders as ordered     Problem: Skin/Tissue Integrity - Adult  Goal: Skin integrity remains intact  Outcome: Progressing  Flowsheets (Taken 10/18/2022 0800)  Skin Integrity Remains Intact:   Monitor for areas of redness and/or skin breakdown   Assess vascular access sites hourly   Every 4-6 hours minimum: Change oxygen saturation probe site  Goal: Incisions, wounds, or drain sites healing without S/S of infection  Outcome: Progressing  Flowsheets (Taken 10/18/2022 0800)  Incisions, Wounds, or Drain Sites Healing Without Sign and Symptoms of Infection:   TWICE DAILY: Assess and document skin integrity   ADMISSION and DAILY: Assess and document risk factors for pressure ulcer development   Initiate pressure ulcer prevention bundle as indicated  Goal: Oral mucous membranes remain intact  Outcome: Progressing  Flowsheets (Taken 10/18/2022 0800)  Oral Mucous Membranes Remain Intact:   Assess oral mucosa and hygiene practices   Implement preventative oral hygiene regimen   Implement oral medicated treatments as ordered     Problem: Musculoskeletal - Adult  Goal: Maintain proper alignment of affected body part  Outcome: Progressing  Goal: Return ADL status to a safe level of function  Outcome: Progressing     Problem: Genitourinary - Adult  Goal: Absence of urinary retention  Outcome: Progressing  Goal: Urinary catheter remains patent  Outcome: Progressing     Problem: Hematologic - Adult  Goal: Maintains hematologic stability  Outcome: Progressing  Flowsheets (Taken 10/18/2022 0800)  Maintains hematologic stability:   Assess for signs and symptoms of bleeding or hemorrhage   Monitor labs for bleeding or clotting disorders   Administer blood products/factors as ordered     Problem: Nutrition Deficit:  Goal: Optimize nutritional status  Outcome: Progressing     Problem: Discharge Planning  Goal: Discharge to home or other facility with appropriate resources  Outcome: Not Progressing  Flowsheets (Taken 10/18/2022 0800)  Discharge to home or other facility with appropriate resources:   Identify barriers to discharge with patient and caregiver   Identify discharge learning needs (meds, wound care, etc)   Arrange for needed discharge resources and transportation as appropriate     Problem: Cardiovascular - Adult  Goal: Absence of cardiac dysrhythmias or at baseline  Outcome: Not Progressing  Flowsheets (Taken 10/18/2022 0800)  Absence of cardiac dysrhythmias or at baseline:   Monitor cardiac rate and rhythm   Assess for signs of decreased cardiac output   Administer antiarrhythmia medication and electrolyte replacement as ordered     Problem: Musculoskeletal - Adult  Goal: Return mobility to safest level of function  Outcome: Not Progressing

## 2022-10-18 NOTE — PROGRESS NOTES
10/18/22 0004   Patient Observation   Heart Rate 85   Resp 11   SpO2 97 %   Vent Information   Vent Mode AC/PRVC   $Ventilation $Subsequent Day   Ventilator Settings   FiO2  50 %   Insp Time (sec) 1.15 sec   Resp Rate (Set) 15 bmp   PEEP/CPAP (cmH2O) 5   Vt (Set, mL) 500 mL   Vent Patient Data (Readings)   Vt (Measured) 519 mL   Peak Inspiratory Pressure (cmH2O) 17 cmH2O   Rate Measured 15 br/min   Minute Volume (L/min) 7.77 Liters   Mean Airway Pressure (cmH2O) 8.4 cmH20   I:E Ratio 1:2.50   I Time/ I Time % 1 s   Vent Alarm Settings   High Pressure (cmH2O) 40 cmH2O   Low Minute Volume (lpm) 2 L/min   Low Exhaled Vt (ml) 200 mL   RR High (bpm) 40 br/min   Additional Respiratoray Assessments   Humidification Source HME   ETT (adult)   Placement Date/Time: 10/16/22 1630   Present on Admission/Arrival: No  Placed By: Licensed provider  Placement Verified By: Colorimetric ETCO2 device; Chest X-ray; Auscultation  Preoxygenation: Yes  Mask Ventilation: Ventilated by mask (1)  Technique: D...    Secured At 24 cm   Measured From Lips   ETT Placement Left   Secured By Commercial tube [email or web address]   Site Assessment Dry   Cuff Pressure 30 cm H2O

## 2022-10-18 NOTE — PROCEDURES
Bronchoscopy note      ASA 4, intubated patient      Patient with acute respiratory failure with hypoxemia and seizure-like activity requiring intubation and mechanical vent to support for airway protection was found to have increased mucous plugs and probably aspiration of the airways and given the patient clinical status and data available resulted to a bronchoscopy and for that reason after informed consent from patient's family, the endoscopy team was requested to come to the bedside with an endoscope, after timeout, the bronchoscope was infused through the endotracheal tube using a T-piece adapter, patient's endotracheal tube was nearly in the right mainstem bronchus and which was pulled out by 3 cm patient was found to have large amounts of thick mucous plugs and some food particles in the right lower lobe bronchus along with some secretions in left lower lobe bronchus, the bronchoscope was wedged into the right lower lobe bronchus and BAL was done from the area, patient did not have any endobronchial lesion, patient tolerated the procedure well and did not have any apparent complications  Patient's bronchial lavage was sent for routine culture  Estimated blood loss was 0  Further management depending on patient clinical status and the test results    Elian Spicer MD

## 2022-10-18 NOTE — PROGRESS NOTES
[address]     Electrophysiology                                     Progress Note    Admission date:  10/15/2022    Reason for follow up visit: CHB, AF     HPI/CC: Sudhakar Herrera was admitted on 10/15/2022 from [facility] ED. He was a patient on the inpatient psychiatry unit at [facility]. He was found to be hypothermic and bradycardic. EKG showed CHB with junctional escape rhythm. He as transferred to [facility] and had a temporary venous pacemaker placed. Dopamine was started. He has remained intubated an sedated. Has also been treated for seizure and aspiration. Rhythm has been AF. Subjective: Unable to assess     Vitals:  Blood pressure 108/60, pulse 95, temperature (!) 96.1 °F (35.6 °C), temperature source Bladder, resp. rate 15, height 5' 8\" (1.727 m), weight 197 lb 15.6 oz (89.8 kg), SpO2 100 %.   Temp  Av.3 °F (36.8 °C)  Min: 96.1 °F (35.6 °C)  Max: 99.1 °F (37.3 °C)  Pulse  Av  Min: 81  Max: 104  BP  Min: 74/48  Max: 108/60  SpO2  Av.8 %  Min: 94 %  Max: 100 %  FiO2   Av.7 %  Min: 40 %  Max: 50 %    24 hour I/O    Intake/Output Summary (Last 24 hours) at 10/18/2022 3606  Last data filed at 10/18/2022 0600  Gross per 24 hour   Intake 2623.11 ml   Output 1795 ml   Net 828.11 ml     Current Facility-Administered Medications   Medication Dose Route Frequency Provider Last Rate Last Admin    senna (SENOKOT) tablet 8.6 mg  1 tablet Per NG tube BID Wolfgang Crowley MD        docusate (COLACE) 50 MG/5ML liquid 100 mg  100 mg Per NG tube BID Wolfgang Crowley MD        mupirocin (BACTROBAN) 2 % ointment   Nasal BID Wolfgang Crowley MD   Given at 10/18/22 0819    chlorhexidine (PERIDEX) 0.12 % solution 15 mL  15 mL Mouth/Throat BID Wolfgang Crowley MD   15 mL at 10/18/22 0822    carboxymethylcellulose PF (THERATEARS) 1 % ophthalmic gel 1 drop  1 drop Both Eyes 6 times per day Wolfgang Crowley MD   1 drop at 10/18/22 0822    cefepime (MAXIPIME) 2000 mg IVPB minibag  2,000 mg IntraVENous Q12H Wolfgang Crowley MD Stopped at 10/18/22 0925    potassium chloride 20 mEq/50 mL IVPB (Central Line)  20 mEq IntraVENous PRJESSICA Lozano MD        Or    potassium chloride 10 mEq/100 mL IVPB (Peripheral Line)  10 mEq IntraVENous PRJESSICA Lozano MD        magnesium sulfate 1000 mg in dextrose 5% 100 mL IVPB  1,000 mg IntraVENous PRN Devon Lozano MD        sodium phosphate 10 mmol in sodium chloride 0.9 % 250 mL IVPB  10 mmol IntraVENous PRN Devon Lozano MD        Or    sodium phosphate 15 mmol in sodium chloride 0.9 % 250 mL IVPB  15 mmol IntraVENous PRJESSICA Lozano MD        Or    sodium phosphate 20 mmol in sodium chloride 0.9 % 500 mL IVPB  20 mmol IntraVENous PRJESSICA Lozano MD        ondansetron [facility]) injection 4 mg  4 mg IntraVENous Q6H PRN Devon Lozano MD        acetaminophen (TYLENOL) tablet 650 mg  650 mg Oral Q4H PRN Devon Lozano MD        Or    acetaminophen (TYLENOL) suppository 650 mg  650 mg Rectal Q4H PRN Devon Lozano MD        ziprasidone (GEODON) injection 10 mg  10 mg IntraMUSCular Q12H PRN Devon Lozano MD        levothyroxine (SYNTHROID) tablet 125 mcg  125 mcg Oral Daily Devon Lozano MD   125 mcg at 10/18/22 0526    melatonin tablet 3 mg  3 mg Oral Nightly PRN Devon Lozano MD        atorvastatin (LIPITOR) tablet 20 mg  20 mg Oral Nightly Devon Lozano MD   20 mg at 10/17/22 2103    OLANZapine (ZYPREXA) tablet 20 mg  20 mg Oral Nightly Devon Lozano MD   20 mg at 10/17/22 2103    fentaNYL (SUBLIMAZE) 1,000 mcg in sodium chloride 0.9% 100 mL infusion   mcg/hr IntraVENous Continuous Eric Reyna MD 5 mL/hr at 10/18/22 0613 50 mcg/hr at 10/18/22 0613    midazolam (VERSED) 100 mg in dextrose 5 % 100 mL infusion  1-10 mg/hr IntraVENous Continuous Eric Reyna MD 1 mL/hr at 10/17/22 1614 1 mg/hr at 10/17/22 1614    heparin (porcine) injection 5,000 Units  5,000 Units SubCUTAneous 3 times per day Eric Reyna MD   5,000 Units at 10/18/22 0604    pantoprazole (PROTONIX) injection 40 mg  40 mg IntraVENous Daily Lizzy Lerma MD   40 mg at 10/18/22 2680    aspirin chewable tablet 81 mg  81 mg Oral Daily Tariq Haddox, DO   81 mg at 10/18/22 0819    DOPamine (INTROPIN) 400 mg in dextrose 5 % 250 mL infusion  1-20 mcg/kg/min IntraVENous Continuous Radha Whittington MD 45.6 mL/hr at 10/18/22 0819 15 mcg/kg/min at 10/18/22 9371    perflutren lipid microspheres (DEFINITY) injection 1.65 mg  1.5 mL IntraVENous ONCE PRN Tariq Haddox, DO           Objective:     Telemetry monitor: AF    Physical Exam:  Constitutional and general appearance: cooperative, no distress, and appears stated age  HEENT: PERRL, no cervical lymphadenopathy. No masses palpable. Normal oral mucosa  Respiratory:  Normal excursion and expansion without use of accessory muscles  Resp auscultation: Normal breath sounds without wheezing, rhonchi, and rales  Cardiovascular: The apical impulse is not displaced  Heart tones are crisp and normal. irregular S1 and S2.  Jugular venous pulsation  unable to assess   The carotid upstroke is normal in amplitude and contour without delay or bruit  Peripheral pulses are symmetrical and full   Abdomen:  No masses or tenderness  Bowel sounds present  Extremities:   No cyanosis or clubbing   No lower extremity edema   Skin: warm and dry  Neurological:  Alert and oriented  Moves all extremities well  No abnormalities of mood, affect, memory, mentation, or behavior are noted    Data    Echo 10/17/2022:   Summary   Technically difficult study due to poor acoustic window and patient on vent. Difficult to assess left ventricular systolic function due to arrhythmia but   appears moderately reduced with an ejection fraction of 35% (better assessed   on definity images, however ectopy makes assessment of lv function   difficult)   Normal left ventricular size with mild concentric left ventricular   hypertrophy.    Left ventricular diastolic filling pressure is normal All labs and testing reviewed. Lab Review     Renal Profile:   Lab Results   Component Value Date/Time    CREATININE 1.3 10/18/2022 03:43 AM    BUN 33 10/18/2022 03:43 AM     10/18/2022 03:43 AM    K 4.4 10/18/2022 03:43 AM    CL 92 10/18/2022 03:43 AM    CO2 23 10/18/2022 03:43 AM     CBC:    Lab Results   Component Value Date/Time    WBC 7.2 10/18/2022 03:43 AM    RBC 3.71 10/18/2022 03:43 AM    HGB 10.9 10/18/2022 03:43 AM    HCT 33.3 10/18/2022 03:43 AM    MCV 89.7 10/18/2022 03:43 AM    RDW 15.9 10/18/2022 03:43 AM     10/18/2022 03:43 AM     BNP:  No results found for: BNP  Fasting Lipid Panel:    Lab Results   Component Value Date/Time    CHOL 109 10/11/2022 03:56 PM    HDL 61 10/11/2022 03:56 PM    TRIG 56 10/11/2022 03:56 PM     Cardiac Enzymes:  CK/MbTroponin  Lab Results   Component Value Date/Time    TROPONINI <0.01 10/15/2022 10:00 PM     PT/ INR   Lab Results   Component Value Date/Time    INR 1.15 10/18/2022 03:43 AM    INR 1.10 10/17/2022 04:06 AM    INR 1.04 10/16/2022 04:03 AM    PROTIME 14.7 10/18/2022 03:43 AM    PROTIME 14.1 10/17/2022 04:06 AM    PROTIME 13.5 10/16/2022 04:03 AM     PTT No results found for: PTT   Lab Results   Component Value Date/Time    MG 2.20 10/18/2022 03:43 AM      Lab Results   Component Value Date/Time    TSH 1.82 10/11/2022 03:56 PM       Assessment:  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 When normothermic and normotensive off of vasopressors, can trial low dose metoprolol given NICM. If bradycardia is recurrent, can further discuss pacemaker implant if that is what family decides upon. 3. Given atrial fibrillation and stroke risk, would like to start anticoagulation for stroke prophylaxis. Discussed at length sister who is at the bedside. She is also his POA. Reports that in the past he was not on anticoagulation as episodes of atrial fibrillation were brief and he had a significant history of falling. More recently, he has been confined to a wheelchair and has been nonambulatory. She will consider and let us know. Plan thoroughly reviewed with Dr. Yudelka Mariano, [address] [place] [address] and primary RN. Critical care time spent reviewing labs/films, examining patient, collaborating with other physicians but excluding procedures for life threatening organ failure is 35 minutes, thus far today.         Maciej Nicolas, CHRISTIAN-CNP  [address]  [phone]

## 2022-10-18 NOTE — PROGRESS NOTES
10/18/22 1117   Patient Observation   Heart Rate 93   Resp 15   SpO2 100 %   Vent Information   Vent Mode AC/PRVC   Ventilator Settings   FiO2  50 %   Insp Time (sec) 1.15 sec   Resp Rate (Set) 15 bmp   PEEP/CPAP (cmH2O) 5   Vt (Set, mL) 500 mL   Vent Patient Data (Readings)   Vt (Measured) 511 mL   Peak Inspiratory Pressure (cmH2O) 17 cmH2O   Rate Measured 15 br/min   Minute Volume (L/min) 7.65 Liters   Mean Airway Pressure (cmH2O) 8.69 cmH20   I:E Ratio 1:2.50   Flow Sensitivity 3 L/min   I Time/ I Time % 1.15 s   Vent Alarm Settings   High Pressure (cmH2O) 40 cmH2O   Low Minute Volume (lpm) 2 L/min   RR High (bpm) 40 br/min   Additional Respiratoray Assessments   Humidification Source HME   Circuit Condensation Drained   Ambu Bag With Mask At Bedside Yes   ETT (adult)   Placement Date/Time: 10/16/22 1630   Present on Admission/Arrival: No  Placed By: Licensed provider  Placement Verified By: Colorimetric ETCO2 device; Chest X-ray; Auscultation  Preoxygenation: Yes  Mask Ventilation: Ventilated by mask (1)  Technique: D...    Secured At 24 cm   Measured From Lips   ETT Placement Left   Secured By Commercial tube rodriguez   Site Assessment Dry   Cuff Pressure 30 cm H2O

## 2022-10-19 ENCOUNTER — APPOINTMENT (OUTPATIENT)
Dept: CT IMAGING | Age: 77
DRG: 308 | End: 2022-10-19
Payer: MEDICARE

## 2022-10-19 LAB
A/G RATIO: 1 (ref 1.1–2.2)
ALBUMIN SERPL-MCNC: 2.7 G/DL (ref 3.4–5)
ALP BLD-CCNC: 157 U/L (ref 40–129)
ALT SERPL-CCNC: 82 U/L (ref 10–40)
ANION GAP SERPL CALCULATED.3IONS-SCNC: 14 MMOL/L (ref 3–16)
AST SERPL-CCNC: 96 U/L (ref 15–37)
BANDED NEUTROPHILS RELATIVE PERCENT: 4 % (ref 0–7)
BASOPHILS ABSOLUTE: 0 K/UL (ref 0–0.2)
BASOPHILS RELATIVE PERCENT: 0 %
BILIRUB SERPL-MCNC: 0.5 MG/DL (ref 0–1)
BUN BLDV-MCNC: 30 MG/DL (ref 7–20)
CALCIUM SERPL-MCNC: 8.6 MG/DL (ref 8.3–10.6)
CHLORIDE BLD-SCNC: 93 MMOL/L (ref 99–110)
CO2: 22 MMOL/L (ref 21–32)
CREAT SERPL-MCNC: 1.1 MG/DL (ref 0.8–1.3)
CULTURE, RESPIRATORY: ABNORMAL
CULTURE, RESPIRATORY: ABNORMAL
EKG ATRIAL RATE: 91 BPM
EKG DIAGNOSIS: NORMAL
EKG Q-T INTERVAL: 392 MS
EKG QRS DURATION: 156 MS
EKG QTC CALCULATION (BAZETT): 479 MS
EKG R AXIS: -9 DEGREES
EKG T AXIS: 131 DEGREES
EKG VENTRICULAR RATE: 90 BPM
EOSINOPHILS ABSOLUTE: 0.1 K/UL (ref 0–0.6)
EOSINOPHILS RELATIVE PERCENT: 2 %
GFR SERPL CREATININE-BSD FRML MDRD: >60 ML/MIN/{1.73_M2}
GLUCOSE BLD-MCNC: 93 MG/DL (ref 70–99)
GLUCOSE BLD-MCNC: 96 MG/DL (ref 70–99)
GRAM STAIN RESULT: ABNORMAL
HCT VFR BLD CALC: 30.9 % (ref 40.5–52.5)
HEMOGLOBIN: 10 G/DL (ref 13.5–17.5)
INR BLD: 1.18 (ref 0.87–1.14)
LYMPHOCYTES ABSOLUTE: 0.4 K/UL (ref 1–5.1)
LYMPHOCYTES RELATIVE PERCENT: 6 %
MAGNESIUM: 2.2 MG/DL (ref 1.8–2.4)
MCH RBC QN AUTO: 29 PG (ref 26–34)
MCHC RBC AUTO-ENTMCNC: 32.3 G/DL (ref 31–36)
MCV RBC AUTO: 89.8 FL (ref 80–100)
MONOCYTES ABSOLUTE: 0.9 K/UL (ref 0–1.3)
MONOCYTES RELATIVE PERCENT: 14 %
MYELOCYTE PERCENT: 2 %
NEUTROPHILS ABSOLUTE: 5 K/UL (ref 1.7–7.7)
NEUTROPHILS RELATIVE PERCENT: 72 %
ORGANISM: ABNORMAL
PDW BLD-RTO: 16.2 % (ref 12.4–15.4)
PERFORMED ON: NORMAL
PHOSPHORUS: 4.8 MG/DL (ref 2.5–4.9)
PLATELET # BLD: 110 K/UL (ref 135–450)
PMV BLD AUTO: 8.4 FL (ref 5–10.5)
POTASSIUM REFLEX MAGNESIUM: 4.7 MMOL/L (ref 3.5–5.1)
PROTHROMBIN TIME: 14.9 SEC (ref 11.7–14.5)
RBC # BLD: 3.44 M/UL (ref 4.2–5.9)
SLIDE REVIEW: ABNORMAL
SODIUM BLD-SCNC: 129 MMOL/L (ref 136–145)
TOTAL PROTEIN: 5.4 G/DL (ref 6.4–8.2)
WBC # BLD: 6.4 K/UL (ref 4–11)

## 2022-10-19 PROCEDURE — 2000000000 HC ICU R&B

## 2022-10-19 PROCEDURE — 36415 COLL VENOUS BLD VENIPUNCTURE: CPT

## 2022-10-19 PROCEDURE — 99291 CRITICAL CARE FIRST HOUR: CPT | Performed by: INTERNAL MEDICINE

## 2022-10-19 PROCEDURE — 2580000003 HC RX 258: Performed by: INTERNAL MEDICINE

## 2022-10-19 PROCEDURE — 99233 SBSQ HOSP IP/OBS HIGH 50: CPT | Performed by: NURSE PRACTITIONER

## 2022-10-19 PROCEDURE — 84100 ASSAY OF PHOSPHORUS: CPT

## 2022-10-19 PROCEDURE — 3609027000 HC BRONCHOSCOPY: Performed by: INTERNAL MEDICINE

## 2022-10-19 PROCEDURE — 2700000000 HC OXYGEN THERAPY PER DAY

## 2022-10-19 PROCEDURE — 74176 CT ABD & PELVIS W/O CONTRAST: CPT

## 2022-10-19 PROCEDURE — 6360000002 HC RX W HCPCS

## 2022-10-19 PROCEDURE — 6370000000 HC RX 637 (ALT 250 FOR IP): Performed by: INTERNAL MEDICINE

## 2022-10-19 PROCEDURE — 0BJ08ZZ INSPECTION OF TRACHEOBRONCHIAL TREE, VIA NATURAL OR ARTIFICIAL OPENING ENDOSCOPIC: ICD-10-PCS | Performed by: INTERNAL MEDICINE

## 2022-10-19 PROCEDURE — 31645 BRNCHSC W/THER ASPIR 1ST: CPT | Performed by: INTERNAL MEDICINE

## 2022-10-19 PROCEDURE — 85025 COMPLETE CBC W/AUTO DIFF WBC: CPT

## 2022-10-19 PROCEDURE — 6360000002 HC RX W HCPCS: Performed by: INTERNAL MEDICINE

## 2022-10-19 PROCEDURE — C9113 INJ PANTOPRAZOLE SODIUM, VIA: HCPCS | Performed by: INTERNAL MEDICINE

## 2022-10-19 PROCEDURE — 94761 N-INVAS EAR/PLS OXIMETRY MLT: CPT

## 2022-10-19 PROCEDURE — A4217 STERILE WATER/SALINE, 500 ML: HCPCS | Performed by: INTERNAL MEDICINE

## 2022-10-19 PROCEDURE — 93010 ELECTROCARDIOGRAM REPORT: CPT | Performed by: INTERNAL MEDICINE

## 2022-10-19 PROCEDURE — 2709999900 HC NON-CHARGEABLE SUPPLY: Performed by: INTERNAL MEDICINE

## 2022-10-19 PROCEDURE — 80053 COMPREHEN METABOLIC PANEL: CPT

## 2022-10-19 PROCEDURE — 94003 VENT MGMT INPAT SUBQ DAY: CPT

## 2022-10-19 PROCEDURE — 83735 ASSAY OF MAGNESIUM: CPT

## 2022-10-19 PROCEDURE — 85610 PROTHROMBIN TIME: CPT

## 2022-10-19 RX ORDER — MIDAZOLAM HYDROCHLORIDE 1 MG/ML
INJECTION INTRAMUSCULAR; INTRAVENOUS
Status: COMPLETED
Start: 2022-10-19 | End: 2022-10-19

## 2022-10-19 RX ORDER — ACETYLCYSTEINE 200 MG/ML
SOLUTION ORAL; RESPIRATORY (INHALATION) PRN
Status: DISCONTINUED | OUTPATIENT
Start: 2022-10-19 | End: 2022-10-19 | Stop reason: ALTCHOICE

## 2022-10-19 RX ORDER — MIDAZOLAM HYDROCHLORIDE 1 MG/ML
2 INJECTION INTRAMUSCULAR; INTRAVENOUS ONCE
Status: COMPLETED | OUTPATIENT
Start: 2022-10-19 | End: 2022-10-19

## 2022-10-19 RX ORDER — MAGNESIUM HYDROXIDE 1200 MG/15ML
LIQUID ORAL CONTINUOUS PRN
Status: COMPLETED | OUTPATIENT
Start: 2022-10-19 | End: 2022-10-19

## 2022-10-19 RX ADMIN — HEPARIN SODIUM 5000 UNITS: 5000 INJECTION INTRAVENOUS; SUBCUTANEOUS at 21:06

## 2022-10-19 RX ADMIN — OLANZAPINE 20 MG: 5 TABLET, FILM COATED ORAL at 21:52

## 2022-10-19 RX ADMIN — MIDAZOLAM HYDROCHLORIDE 2 MG: 1 INJECTION INTRAMUSCULAR; INTRAVENOUS at 15:31

## 2022-10-19 RX ADMIN — DOCUSATE SODIUM 100 MG: 50 LIQUID ORAL at 21:06

## 2022-10-19 RX ADMIN — CARBOXYMETHYLCELLULOSE SODIUM 1 DROP: 10 GEL OPHTHALMIC at 04:30

## 2022-10-19 RX ADMIN — CARBOXYMETHYLCELLULOSE SODIUM 1 DROP: 10 GEL OPHTHALMIC at 21:02

## 2022-10-19 RX ADMIN — MUPIROCIN: 20 OINTMENT TOPICAL at 10:17

## 2022-10-19 RX ADMIN — ASPIRIN 81 MG 81 MG: 81 TABLET ORAL at 10:09

## 2022-10-19 RX ADMIN — SENNOSIDES 8.6 MG: 8.6 TABLET, COATED ORAL at 21:06

## 2022-10-19 RX ADMIN — PANTOPRAZOLE SODIUM 40 MG: 40 INJECTION, POWDER, FOR SOLUTION INTRAVENOUS at 10:09

## 2022-10-19 RX ADMIN — HEPARIN SODIUM 5000 UNITS: 5000 INJECTION INTRAVENOUS; SUBCUTANEOUS at 05:50

## 2022-10-19 RX ADMIN — MUPIROCIN: 20 OINTMENT TOPICAL at 21:52

## 2022-10-19 RX ADMIN — CARBOXYMETHYLCELLULOSE SODIUM 1 DROP: 10 GEL OPHTHALMIC at 10:09

## 2022-10-19 RX ADMIN — CARBOXYMETHYLCELLULOSE SODIUM 1 DROP: 10 GEL OPHTHALMIC at 11:48

## 2022-10-19 RX ADMIN — HEPARIN SODIUM 5000 UNITS: 5000 INJECTION INTRAVENOUS; SUBCUTANEOUS at 15:29

## 2022-10-19 RX ADMIN — SENNOSIDES 8.6 MG: 8.6 TABLET, COATED ORAL at 10:09

## 2022-10-19 RX ADMIN — CEFEPIME 2000 MG: 2 INJECTION, POWDER, FOR SOLUTION INTRAVENOUS at 15:46

## 2022-10-19 RX ADMIN — LEVOTHYROXINE SODIUM 125 MCG: 0.12 TABLET ORAL at 05:52

## 2022-10-19 RX ADMIN — Medication 15 ML: at 21:03

## 2022-10-19 RX ADMIN — ATORVASTATIN CALCIUM 20 MG: 10 TABLET, FILM COATED ORAL at 21:06

## 2022-10-19 RX ADMIN — CEFEPIME 2000 MG: 2 INJECTION, POWDER, FOR SOLUTION INTRAVENOUS at 04:43

## 2022-10-19 RX ADMIN — DOCUSATE SODIUM 100 MG: 50 LIQUID ORAL at 10:09

## 2022-10-19 RX ADMIN — MIDAZOLAM 2 MG: 1 INJECTION, SOLUTION INTRAMUSCULAR; INTRAVENOUS at 15:31

## 2022-10-19 RX ADMIN — Medication 15 ML: at 10:10

## 2022-10-19 RX ADMIN — CARBOXYMETHYLCELLULOSE SODIUM 1 DROP: 10 GEL OPHTHALMIC at 15:48

## 2022-10-19 ASSESSMENT — PAIN SCALES - GENERAL
PAINLEVEL_OUTOF10: 0

## 2022-10-19 ASSESSMENT — PULMONARY FUNCTION TESTS
PIF_VALUE: 16
PIF_VALUE: 13
PIF_VALUE: 16
PIF_VALUE: 16
PIF_VALUE: 14
PIF_VALUE: 17
PIF_VALUE: 16
PIF_VALUE: 15
PIF_VALUE: 16
PIF_VALUE: 14
PIF_VALUE: 19
PIF_VALUE: 17
PIF_VALUE: 16
PIF_VALUE: 16
PIF_VALUE: 17
PIF_VALUE: 13
PIF_VALUE: 17
PIF_VALUE: 14
PIF_VALUE: 14
PIF_VALUE: 17
PIF_VALUE: 16
PIF_VALUE: 12
PIF_VALUE: 21
PIF_VALUE: 17
PIF_VALUE: 16
PIF_VALUE: 26
PIF_VALUE: 28
PIF_VALUE: 13
PIF_VALUE: 16
PIF_VALUE: 18
PIF_VALUE: 14

## 2022-10-19 NOTE — PROGRESS NOTES
10/19/22 1135   Patient Observation   Heart Rate (!) 101   Resp 16   SpO2 92 %   Vent Information   Vent Mode AC/PRVC   Ventilator Settings   FiO2  40 %   Insp Time (sec) 1.15 sec   Resp Rate (Set) 15 bmp   PEEP/CPAP (cmH2O) 5   Vt (Set, mL) 500 mL   Vent Patient Data (Readings)   Vt (Measured) 533 mL   Peak Inspiratory Pressure (cmH2O) 14 cmH2O   Rate Measured 17 br/min   Minute Volume (L/min) 8.82 Liters   Mean Airway Pressure (cmH2O) 8 cmH20   Inspiratory Time 1.15 sec   I:E Ratio 1:2.10   Flow Sensitivity 3 L/min   I Time/ I Time % 1 s   Vent Alarm Settings   High Pressure (cmH2O) 40 cmH2O   Low Minute Volume (lpm) 2 L/min   RR High (bpm) 40 br/min   ETT (adult)   Placement Date/Time: 10/16/22 1630   Present on Admission/Arrival: No  Placed By: Licensed provider  Placement Verified By: Colorimetric ETCO2 device; Chest X-ray; Auscultation  Preoxygenation: Yes  Mask Ventilation: Ventilated by mask (1)  Technique: D...    Secured At 23 cm   Measured From Lips   ETT Placement Center   Secured By Commercial tube rodriguez   Site Assessment Dry

## 2022-10-19 NOTE — PROGRESS NOTES
10/19/22 0020   Patient Observation   Heart Rate 90   Resp 15   SpO2 98 %   Breath Sounds   Right Upper Lobe Diminished   Right Middle Lobe Diminished   Right Lower Lobe Diminished   Left Upper Lobe Diminished   Left Lower Lobe Diminished   Vent Information   Vent Mode AC/PRVC   Ventilator Settings   FiO2  40 %   Insp Time (sec) 1.15 sec   Resp Rate (Set) 15 bmp   PEEP/CPAP (cmH2O) 5   Vt (Set, mL) 500 mL   Vent Patient Data (Readings)   Vt (Measured) 511 mL   Peak Inspiratory Pressure (cmH2O) 17 cmH2O   Rate Measured 15 br/min   Minute Volume (L/min) 7.64 Liters   Mean Airway Pressure (cmH2O) 8.4 cmH20   I:E Ratio 1:2.50   I Time/ I Time % 1.15 s   Vent Alarm Settings   High Pressure (cmH2O) 40 cmH2O   Low Minute Volume (lpm) 2 L/min   High Minute Volume (lpm) 20 L/min   Low Exhaled Vt (ml) 200 mL   RR High (bpm) 40 br/min   Apnea (secs) 20 secs   Additional Respiratoray Assessments   Humidification Source HME   Ambu Bag With Mask At Bedside Yes   Airway Clearance   Suction ET Tube   Subglottic Suction Done Yes   Suction Device Inline suction catheter   Sputum Method Obtained Endotracheal   Sputum Amount Moderate   Sputum Color/Odor Tan   Sputum Consistency Thin;Thick   ETT (adult)   Placement Date/Time: 10/16/22 1630   Present on Admission/Arrival: No  Placed By: Licensed provider  Placement Verified By: Colorimetric ETCO2 device; Chest X-ray; Auscultation  Preoxygenation: Yes  Mask Ventilation: Ventilated by mask (1)  Technique: D...    Secured At 23 cm   Measured From Lips   ETT Placement Center   Secured By Commercial tube rodriguez   Site Assessment Dry   Cuff Pressure 30 cm H2O

## 2022-10-19 NOTE — PROGRESS NOTES
Wasted 45 mL of Fentanyl  and 115 mL of versed with Arie Melendez RN. Med disposed of into the Rx Destroyer per protocol.     Primary Nurse eSignature: Electronically signed by Anderson House RN on 10/19/22 at 3:36 PM EDT    **SHARE this note so that the co-signing nurse is able to place an eSignature**    Co-signer eSignature: Electronically signed by Xiang Kirby RN on 10/19/22 at 3:37 PM EDT

## 2022-10-19 NOTE — PROGRESS NOTES
10/19/22 1517   Patient Observation   Heart Rate 100   Resp 14   SpO2 97 %   Vent Information   Vent Mode CPAP/PS   Ventilator Settings   FiO2  40 %   PEEP/CPAP (cmH2O) 5   Pressure Support (cm H2O) 10 cm H2O   Vent Patient Data (Readings)   Vt Spont (mL) 650 mL   Vt (Measured) 548 mL   Peak Inspiratory Pressure (cmH2O) 16 cmH2O   Rate Measured 14 br/min   Minute Volume (L/min) 7.71 Liters   Mean Airway Pressure (cmH2O) 7.8 cmH20   I:E Ratio 1:2.30   Backup Apnea On   Backup Rate 15 Breaths Per Minute   Vent Alarm Settings   High Pressure (cmH2O) 40 cmH2O   Low Minute Volume (lpm) 2 L/min   RR High (bpm) 40 br/min   Additional Respiratoray Assessments   Humidification Source HME   Circuit Condensation Not drained   Ambu Bag With Mask At Bedside Yes   ETT (adult)   Placement Date/Time: 10/16/22 1630   Present on Admission/Arrival: No  Placed By: Licensed provider  Placement Verified By: Colorimetric ETCO2 device; Chest X-ray; Auscultation  Preoxygenation: Yes  Mask Ventilation: Ventilated by mask (1)  Technique: D...    Secured At 23 cm   Measured From Lips   ETT Placement Left   Secured By Commercial tube rodriguez   Site Assessment Dry   Cuff Pressure 30 cm H2O

## 2022-10-19 NOTE — PROGRESS NOTES
10/19/22 0752   Patient Observation   Heart Rate 88   Resp 13   SpO2 95 %   Vent Information   Vent Mode CPAP/PS   Ventilator Settings   FiO2  35 %   PEEP/CPAP (cmH2O) 5   Pressure Support (cm H2O) 8 cm H2O   Vent Patient Data (Readings)   Vt Spont (mL) 492 mL   Vt (Measured) 557 mL   Peak Inspiratory Pressure (cmH2O) 13 cmH2O   Rate Measured 12 br/min   Minute Volume (L/min) 6.32 Liters   Mean Airway Pressure (cmH2O) 7.6 cmH20   I:E Ratio 1:2.00   Backup Apnea On   Backup Rate 15 Breaths Per Minute   Vent Alarm Settings   High Pressure (cmH2O) 40 cmH2O   Low Minute Volume (lpm) 2 L/min   RR High (bpm) 40 br/min

## 2022-10-19 NOTE — PROGRESS NOTES
Hospitalist Progress Note      PCP: [facility]    Date of Admission: 10/15/2022    Chief Complaint: respiratory failure s/p intubation. HPI   78 y.o. male. Patient has a h/o schizoaffective disorder. He lives in [place], [place]. He receives most of his medical care through the [place] in [place]. He was initially admitted to [facility] [address] [place] [address] in [place], [place] for hallucinations and impulsive behavior. He was there for ten days then transferred to the [place] [facility] inpatient psychiatry unit on 10/11. Around 9pm on 10/15 a code blue was called because the patient briefly went unresponsive. He had been bradycardic all day  He had been taking metoprolol chronically but this had been held throughout the day because of his bradycardia. His nurse was trying to check his vitals again as it appeared he was breathing abnormally. While vital signs were being checked the patient lost consciousness briefly (less than a minute). He awoke but was markedly bradycardic. He was sent to the ER where he was found to be hypothermic, hypotensive, and bradycardic to the 20s in complete heart block. He was transferred to [facility] for urgent transvenous pacing lead placement. Patient had a h/o chronic systolic HF, thought to be nonischemic, and paroxysmal afib. The patient required sedation during temporary pacing lead placement and then became agitated thereafter. He required restraints and was given IM geodon, so was not meaningfully interactive or able to provide any history. According to Dr. Guillermina Major notes the patient had already been difficult to understand at baseline. Subjective: Pt remains intubated. Off dopamine drip. NSR on tele monitor. Weaning trial today. Patient had bronchoscopy.      Medications:  Reviewed    Infusion Medications    norepinephrine Stopped (10/18/22 2200)    fentaNYL Stopped (10/19/22 0222)    midazolam Stopped (10/18/22 6627)     Scheduled Medications    midazolam  2 mg IntraVENous Once    midazolam        senna  1 tablet Per NG tube BID    docusate  100 mg Per NG tube BID    mupirocin   Nasal BID    chlorhexidine  15 mL Mouth/Throat BID    carboxymethylcellulose PF  1 drop Both Eyes 6 times per day    cefepime  2,000 mg IntraVENous Q12H    levothyroxine  125 mcg Oral Daily    atorvastatin  20 mg Oral Nightly    OLANZapine  20 mg Oral Nightly    heparin (porcine)  5,000 Units SubCUTAneous 3 times per day    pantoprazole  40 mg IntraVENous Daily    aspirin  81 mg Oral Daily     PRN Meds: potassium chloride **OR** potassium chloride, magnesium sulfate, sodium phosphate IVPB **OR** sodium phosphate IVPB **OR** sodium phosphate IVPB, ondansetron, acetaminophen **OR** acetaminophen, ziprasidone, melatonin, perflutren lipid microspheres      Intake/Output Summary (Last 24 hours) at 10/19/2022 1340  Last data filed at 10/19/2022 1300  Gross per 24 hour   Intake 1747.27 ml   Output 1102 ml   Net 645.27 ml       Physical Exam Performed:    /62   Pulse 99   Temp 97.8 °F (36.6 °C) (Bladder)   Resp 12   Ht 5' 8\" (1.727 m)   Wt 205 lb 7.5 oz (93.2 kg)   SpO2 95%   BMI 31.24 kg/m²     General appearance: intubated, sedated   HEENT: Pupils equal, round, and reactive to light. Conjunctivae/corneas clear. Neck: Supple, with full range of motion. No jugular venous distention. Trachea midline. Respiratory:  Normal respiratory effort. Clear to auscultation, bilaterally without Rales/Wheezes/Rhonchi. Cardiovascular: Regular rate and rhythm with normal S1/S2 without murmurs, rubs or gallops. Abdomen: Soft, non-tender, non-distended with normal bowel sounds. Musculoskeletal: No clubbing, cyanosis or edema bilaterally. Full range of motion without deformity. Skin: Skin color, texture, turgor normal.  No rashes or lesions. Neurologic:  Neurovascularly intact without any focal sensory/motor deficits.  Cranial nerves: II-XII intact, grossly non-focal.  Psychiatric: Alert and oriented, thought content appropriate, normal insight  Capillary Refill: Brisk, 3 seconds, normal   Peripheral Pulses: +2 palpable, equal bilaterally       Labs:   Recent Labs     10/17/22  0406 10/18/22  0343 10/19/22  0403   WBC 7.0 7.2 6.4   HGB 10.7* 10.9* 10.0*   HCT 32.5* 33.3* 30.9*    125* 110*     Recent Labs     10/17/22  0406 10/18/22  0343 10/19/22  0403   * 128* 129*   K 4.6 4.4 4.7   CL 95* 92* 93*   CO2 27 23 22   BUN 33* 33* 30*   CREATININE 1.4* 1.3 1.1   CALCIUM 8.6 8.2* 8.6   PHOS 3.8 4.5 4.8     Recent Labs     10/17/22  0406 10/18/22  0343 10/19/22  0403   AST 30 51* 96*   ALT 37 49* 82*   BILITOT 0.3 0.4 0.5   ALKPHOS 105 125 157*     Recent Labs     10/17/22  0406 10/18/22  0343 10/19/22  0403   INR 1.10 1.15* 1.18*     No results for input(s): CKTOTAL, TROPONINI in the last 72 hours. Urinalysis:      Lab Results   Component Value Date/Time    NITRU POSITIVE 10/17/2022 04:15 AM    WBCUA 10-20 10/17/2022 04:15 AM    BACTERIA 2+ 10/17/2022 04:15 AM    RBCUA 21-50 10/17/2022 04:15 AM    BLOODU LARGE 10/17/2022 04:15 AM    SPECGRAV 1.010 10/17/2022 04:15 AM    GLUCOSEU Negative 10/17/2022 04:15 AM       Radiology:  XR CHEST PORTABLE   Final Result   Supportive tubing projects in normal position. Low lung volumes. Right basilar opacity, indeterminate for atelectasis or pneumonia/aspiration   pneumonitis. XR CHEST PORTABLE   Final Result   Endotracheal tube tip is 3 cm above the ade. Enteric tube tip is within   the stomach with side port just below the gastroesophageal junction. XR ABDOMEN (KUB) (SINGLE AP VIEW)   Final Result   Endotracheal tube tip is 3 cm above the ade. Enteric tube tip is within   the stomach with side port just below the gastroesophageal junction.          CT ABDOMEN PELVIS WO CONTRAST Additional Contrast? None    (Results Pending)           Assessment/Plan:    Active Hospital Problems    Diagnosis     Proteus mirabilis infection [A49.8]      Priority: Medium    Staphylococcal pneumonia ([facility] [J15.20]      Priority: Medium    Pyuria [R82.81]      Priority: Medium    Aspiration into airway [T17.908A]      Priority: Medium    Complete heart block ([address] [I44.2]      Priority: Medium    Hypotension [I95.9]      Priority: Medium    Systolic heart failure ([facility] [I50.20]      Priority: Medium    Symptomatic bradycardia [R00.1]      Priority: Medium    Hyperkalemia [E87.5]      Priority: Medium    AUSTIN (acute kidney injury) ([facility] [N17.9]      Priority: Medium    Hypoglycemia [E16.2]      Priority: Medium    Hypothermia [T68. XXXA]      Priority: Medium    Hyponatremia [E87.1]      Priority: Medium    Pulmonary infiltrates [R91.8]      Priority: Medium    Shock circulatory ([facility] [R57.9]      Priority: Medium    Normocytic hypochromic anemia [D50.9]      Priority: Medium    Observed seizure-like activity (HCC) [R56.9]      Priority: Medium    Acute respiratory failure with hypoxia (HCC) [J96.01]      Priority: Medium    Paroxysmal atrial fibrillation (HCC) [I48.0]      Priority: Medium    Schizoaffective disorder (HCC) [F25.9]      Priority: Medium    Cardiomyopathy ([facility] [I42.9]      Acute hypoxemic respiratory failure   On mechanical ventilator   Daily weaning trial   Management as per ICU team    Pneumonia  IV cefepime    Bradycardia  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 Cardiology on board. Abnormal LFT    AUSTIN  Monitor GFR, avoid nephrotoxic agent     Hypothermia  resolved     Acute on chronic Systolic HF  New EF 81%  C/w current meds      Schizoaffective D/O  -  Continue home olanzapine 20mg nightly, trazodone 50mg nightly.          DVT Prophylaxis: heparin   Diet: ADULT TUBE FEEDING; Nasogastric; Renal Formula; Continuous; 10; Yes; 10; Q 4 hours; 35; 30; Q 4 hours; Protein; 1 bottle of proteinex daily via syringe, 30 mL before and after administration  Code Status: Full Code  PT/OT Eval Status: yes    Dispo - Critically ill        Marilee Saenz MD

## 2022-10-19 NOTE — CARE COORDINATION
Case Management Follow up:      Chart reviewed and case discussed during huddle and rounds. Pt is admitted day # 4. Unit C-2. Diagnosis and currently status as per MD progress:Bradycardia- CHB, s/p temp. Pacer. Still on Vent- attempting SBT today. Services following:IM, Pulm, EP    Anticipated Discharge date: TBD. Per notes, \"critically ill. \"    Expected Plan for Discharge: TBD, SNF vs. [facility]ACH pending progress. Pt from [address] PTA. This RN CM made referral to [facility] for screening. [place] states that they will follow to see how Pt progresses once extubated and for any behaviors. Potential Barriers: mental health issues may be a barrier to [facility], [facility] or SNF placement. Pt came from [address], hx of Schizophrenia and Schizoaffective disorder. Will follow. RN CM will continue to follow Pt clinical course for DCP needs.      Fernando Dent RN, BSN    [facility]  [phone]

## 2022-10-19 NOTE — PROGRESS NOTES
10/19/22 0746   Patient Observation   Heart Rate 90   Resp 15   SpO2 95 %   Vent Information   Vent Mode AC/PRVC   Ventilator Settings   FiO2  35 %   Insp Time (sec) 1.15 sec   Resp Rate (Set) 15 bmp   PEEP/CPAP (cmH2O) 5   Vt (Set, mL) 500 mL   Vent Patient Data (Readings)   Vt (Measured) 470 mL   Peak Inspiratory Pressure (cmH2O) 28 cmH2O   Rate Measured 19 br/min   Minute Volume (L/min) 9.52 Liters   Mean Airway Pressure (cmH2O) 12 cmH20   Inspiratory Time 1.15 sec   I:E Ratio 1:1.90   Flow Sensitivity 3 L/min   I Time/ I Time % 1 s   Vent Alarm Settings   High Pressure (cmH2O) 40 cmH2O   Low Minute Volume (lpm) 2 L/min   High Minute Volume (lpm) 20 L/min   Low Exhaled Vt (ml) 200 mL   High Exhaled Vt (ml) 1000 mL   RR High (bpm) 40 br/min   Apnea (secs) 20 secs   Additional Respiratoray Assessments   Humidification Source HME   Circuit Condensation Not drained   Ambu Bag With Mask At Bedside Yes   ETT (adult)   Placement Date/Time: 10/16/22 1630   Present on Admission/Arrival: No  Placed By: Licensed provider  Placement Verified By: Colorimetric ETCO2 device; Chest X-ray; Auscultation  Preoxygenation: Yes  Mask Ventilation: Ventilated by mask (1)  Technique: D...    Secured At 23 cm   Measured From Lips   ETT Placement Left   Secured By Commercial tube rodriguez   Site Assessment Dry   Cuff Pressure 30 cm H2O

## 2022-10-19 NOTE — PROGRESS NOTES
10/18/22 2035   Patient Observation   Heart Rate (!) 106   Resp 15   SpO2 97 %   Breath Sounds   Right Upper Lobe Diminished   Right Middle Lobe Diminished   Right Lower Lobe Diminished   Left Upper Lobe Diminished   Left Lower Lobe Diminished   Vent Information   Vent Mode AC/PRVC   Ventilator Settings   FiO2  40 %   Insp Time (sec) 1.15 sec   Resp Rate (Set) 15 bmp   PEEP/CPAP (cmH2O) 5   Vt (Set, mL) 500 mL   Vent Patient Data (Readings)   Vt (Measured) 501 mL   Peak Inspiratory Pressure (cmH2O) 16 cmH2O   Rate Measured 15 br/min   Minute Volume (L/min) 7.48 Liters   Mean Airway Pressure (cmH2O) 8.3 cmH20   I:E Ratio 1:2.50   I Time/ I Time % 1.15 s   Vent Alarm Settings   High Pressure (cmH2O) 40 cmH2O   Low Minute Volume (lpm) 2 L/min   High Minute Volume (lpm) 20 L/min   Low Exhaled Vt (ml) 200 mL   RR High (bpm) 40 br/min   Apnea (secs) 20 secs   Additional Respiratoray Assessments   Humidification Source HME   Ambu Bag With Mask At Bedside Yes   ETT (adult)   Placement Date/Time: 10/16/22 1630   Present on Admission/Arrival: No  Placed By: Licensed provider  Placement Verified By: Colorimetric ETCO2 device; Chest X-ray; Auscultation  Preoxygenation: Yes  Mask Ventilation: Ventilated by mask (1)  Technique: D...    Secured At 23 cm   Measured From Lips   ETT Placement Left   Secured By Commercial tube rodriguez   Site Assessment Dry

## 2022-10-19 NOTE — PROGRESS NOTES
INPATIENT PULMONARY CRITICAL CARE PROGRESS NOTE      Reason for visit    hypothermia, hypotensive and bradycardic status post urgent temporary transvenous pacemaker placement    SUBJECTIVE: Patient when seen this morning continues to be critically ill on mechanical vent support, patient again has thick mucous plugs which were blocking the endotracheal tube as per nursing, patient tolerated the CPAP with pressure support for few hours yesterday, patient also had transient hypothermia, patient has borderline blood pressure without the pressors, patient continues to be on IV sedation to maintain patient mental synchrony, patient was on 40% oxygen to maintain, patient's glycemic control was acceptable, patient has had good urine output overnight with cumulative fluid balance of +1.1L, patient has some abdominal distention, patient has not had any bowel movement as per nursing, pertinent review of system could be obtained because of patient clinical status         Physical Exam:  Blood pressure (!) 90/51, pulse 88, temperature (!) 96.2 °F (35.7 °C), temperature source Bladder, resp. rate 13, height 5' 8\" (1.727 m), weight 205 lb 7.5 oz (93.2 kg), SpO2 95 %.'     Constitutional:  No acute distress. On mechanical vent support  HENT: Endotracheal tube present no thyromegaly. Eyes:  Conjunctivae are normal. Pupils equal, round, and reactive to light. No scleral icterus. Neck: . No tracheal deviation present. No obvious thyroid mass. Cardiovascular: Paced rhythm normal heart sounds. No right ventricular heave. No lower extremity edema. Pulmonary/Chest: No wheezes. Persistent increased rales. Chest wall is not dull to percussion. No accessory muscle usage or stridor. Abdominal: Soft. Bowel sounds present. Distended with distinctive right lumbar and right lower lower quadrant tenderness. Musculoskeletal: No cyanosis. No clubbing. No obvious joint deformity.    Lymphadenopathy: No cervical or supraclavicular adenopathy. Skin: Skin is warm and dry. No rash or nodules on the exposed extremities. No  Neurologic communicative on the vent    Results:  CBC:   Recent Labs     10/17/22  0406 10/18/22  0343 10/19/22  0403   WBC 7.0 7.2 6.4   HGB 10.7* 10.9* 10.0*   HCT 32.5* 33.3* 30.9*   MCV 89.2 89.7 89.8    125* 110*       BMP:   Recent Labs     10/17/22  0406 10/18/22  0343 10/19/22  0403   * 128* 129*   K 4.6 4.4 4.7   CL 95* 92* 93*   CO2 27 23 22   PHOS 3.8 4.5 4.8   BUN 33* 33* 30*   CREATININE 1.4* 1.3 1.1       LIVER PROFILE:   Recent Labs     10/17/22  0406 10/18/22  0343 10/19/22  0403   AST 30 51* 96*   ALT 37 49* 82*   BILITOT 0.3 0.4 0.5   ALKPHOS 105 125 157*       PT/INR:   Recent Labs     10/17/22  0406 10/18/22  0343 10/19/22  0403   PROTIME 14.1 14.7* 14.9*   INR 1.10 1.15* 1.18*       APTT: No results for input(s): APTT in the last 72 hours. UA:  Recent Labs     10/17/22  0415   COLORU Yellow   PHUR >=9.0*   WBCUA 10-20*   RBCUA 21-50*   BACTERIA 2+*   CLARITYU SL CLOUDY*   SPECGRAV 1.010   LEUKOCYTESUR LARGE*   UROBILINOGEN 0.2   BILIRUBINUR Negative   BLOODU LARGE*   GLUCOSEU Negative         Imaging:  I have reviewed radiology images personally. XR CHEST PORTABLE   Final Result   Supportive tubing projects in normal position. Low lung volumes. Right basilar opacity, indeterminate for atelectasis or pneumonia/aspiration   pneumonitis. XR CHEST PORTABLE   Final Result   Endotracheal tube tip is 3 cm above the ade. Enteric tube tip is within   the stomach with side port just below the gastroesophageal junction. XR ABDOMEN (KUB) (SINGLE AP VIEW)   Final Result   Endotracheal tube tip is 3 cm above the ade. Enteric tube tip is within   the stomach with side port just below the gastroesophageal junction.          CT ABDOMEN PELVIS WO CONTRAST Additional Contrast? None    (Results Pending)       CULTURE, RESPIRATORY >100,000 CFU/mL Normal respiratory emmanuel Abnormal   [address] [facility]   Gram Stain Result 4+ WBC's (Polymorphonuclear)   1+ WBC's (Mononuclear)   2+ Gram positive cocci   2+ Gram positive rods  [facility]   Organism Staphylococcus aureus Abnormal   [facility]   CULTURE, RESPIRATORY >100,000 CFU/mL  [address] [facility]        Susceptibility    Staphylococcus aureus (1)    Antibiotic Interpretation Microscan  Method Status    ceFAZolin Sensitive <=4 mcg/mL BACTERIAL SUSCEPTIBILITY PANEL BY MAGALIE     clindamycin Sensitive <=0.5 mcg/mL BACTERIAL SUSCEPTIBILITY PANEL BY MAGALIE     erythromycin Sensitive <=0.5 mcg/mL BACTERIAL SUSCEPTIBILITY PANEL BY MAGALIE     oxacillin Sensitive 0.5 mcg/mL BACTERIAL SUSCEPTIBILITY PANEL BY MAGALIE     tetracycline Sensitive <=4 mcg/mL BACTERIAL SUSCEPTIBILITY PANEL BY MAGALIE     trimethoprim-sulfamethoxazole Sensitive                   Assessment:  Principal Problem:    Symptomatic bradycardia  Active Problems:    Schizoaffective disorder (HCC)    Paroxysmal atrial fibrillation (HCC)    Complete heart block (HCC)    Hypotension    Systolic heart failure (HCC)    Hyperkalemia    AUSTIN (acute kidney injury) ([address]    Hypoglycemia    Hypothermia    Hyponatremia    Pulmonary infiltrates    Shock circulatory (HCC)    Normocytic hypochromic anemia    Observed seizure-like activity (HCC)    Acute respiratory failure with hypoxia (HCC)    Pyuria    Aspiration into airway    Proteus mirabilis infection    Staphylococcal pneumonia ([address]    Cardiomyopathy ([address]  Resolved Problems:    * No resolved hospital problems.  *          Plan:   Ventilatory support to keep saturation between 90 and 94% only  Ventilatory settings and waveforms reviewed  Ventilatory changes made  Pulmonary toilet  Patient continues to have thick mucous plugs blocking the endotracheal tube which is precluding the patient from getting liberated from the ventilator and patient will benefit from another therapeutic bronchoscopy- will arrange that today  Got 1 dose of vancomycin empirically yesterday   Patient's lung culture shows patient to have MSSA  Patient was empirically started on cefepime which can be continued  Titration of antimicrobials as per the patient's clinical status and cultures  IV sedation to maintain patient ventilator synchrony  Titration of sedation as per RASS scores  We will get a CT abdomen pelvis to make sure the patient does not have any pathology causing him to have abdominal distention and localized tenderness  Status post temporary transvenous pacemaker  Monitor cardiac rhythm and hemodynamics closely  EP [id number] not want to place any permanent pacemaker at this time as told to nursing  IV pressors, if required, to keep mean artery pressure more than 65 L and likely  No need for any Miles hugger on a regular basis  Enteral feeds as per metabolic support  Random cortisol level was normal  Patient recent echocardiogram from care everywhere was reviewed  Monitor I/O  and BMP  Correct electrolytes on whenever necessary basis  Monitor for any worsening hypoglycemia  PUD and DVT prophylaxis      Patient's clinical status remains precarious and critical with potential for further decompensation and needs to monitor closely in the ICU    Case discussed with ICU team and family      Critical care time from the patient was 35 minutes exclusive of any procedures        Electronically signed by:  Israel Cody MD    10/19/2022    9:38 AM.

## 2022-10-19 NOTE — PROGRESS NOTES
[address]     Electrophysiology                                     Progress Note    Admission date:  10/15/2022    Reason for follow up visit: CHB, AF     HPI/CC: Rachele Miller was admitted on 10/15/2022 from [facility] ED. He was a patient on the inpatient psychiatry unit at [facility]. He was found to be hypothermic and bradycardic. EKG showed CHB with junctional escape rhythm. He as transferred to [facility] and had a temporary venous pacemaker placed. Dopamine was started. Sedation and dopamine are off. He is still intubated with SBT ongoing. Rhythm is now sinus with 1st degree AVB, frequent PVC's and rates in the 80-90's. Subjective: Unable to assess. Vitals:  Blood pressure (!) 90/51, pulse 88, temperature (!) 96.2 °F (35.7 °C), temperature source Bladder, resp. rate 13, height 5' 8\" (1.727 m), weight 205 lb 7.5 oz (93.2 kg), SpO2 95 %.   Temp  Av.8 °F (36 °C)  Min: 96.2 °F (35.7 °C)  Max: 97.4 °F (36.3 °C)  Pulse  Av.6  Min: 81  Max: 107  BP  Min: 76/52  Max: 116/66  SpO2  Av.3 %  Min: 95 %  Max: 100 %  FiO2   Av.1 %  Min: 35 %  Max: 50 %    24 hour I/O    Intake/Output Summary (Last 24 hours) at 10/19/2022 1041  Last data filed at 10/19/2022 0222  Gross per 24 hour   Intake 1747.27 ml   Output 1055 ml   Net 692.27 ml       Current Facility-Administered Medications   Medication Dose Route Frequency Provider Last Rate Last Admin    senna (SENOKOT) tablet 8.6 mg  1 tablet Per NG tube BID Lizzy Lerma MD   8.6 mg at 10/19/22 1009    docusate (COLACE) 50 MG/5ML liquid 100 mg  100 mg Per NG tube BID Lizzy Lerma MD   100 mg at 10/19/22 1009    norepinephrine (LEVOPHED) 16 mg in dextrose 5 % 250 mL infusion  1-100 mcg/min IntraVENous Continuous Lizzy Lerma MD   Held at 10/18/22 2200    mupirocin (BACTROBAN) 2 % ointment   Nasal BID Lizzy Lerma MD   Given at 10/19/22 1017    chlorhexidine (PERIDEX) 0.12 % solution 15 mL  15 mL Mouth/Throat BID Lizzy Lerma MD   15 mL at mg/hr IntraVENous Continuous Luisa Jackson MD   Stopped at 10/18/22 1319    heparin (porcine) injection 5,000 Units  5,000 Units SubCUTAneous 3 times per day Luisa Jackson MD   5,000 Units at 10/19/22 0550    pantoprazole (PROTONIX) injection 40 mg  40 mg IntraVENous Daily Luisa Jackson MD   40 mg at 10/19/22 1009    aspirin chewable tablet 81 mg  81 mg Oral Daily Tariq Haddox, DO   81 mg at 10/19/22 1009    perflutren lipid microspheres (DEFINITY) injection 1.65 mg  1.5 mL IntraVENous ONCE PRN Tariq Haddox, DO           Objective:     Telemetry monitor: AF    Physical Exam:  Constitutional and general appearance: cooperative, no distress, and appears stated age  HEENT: PERRL, no cervical lymphadenopathy. No masses palpable. Normal oral mucosa  Respiratory:  Normal excursion and expansion without use of accessory muscles  Resp auscultation: Normal breath sounds without wheezing, rhonchi, and rales  Cardiovascular: The apical impulse is not displaced  Heart tones are crisp and normal. irregular S1 and S2.  Jugular venous pulsation  unable to assess   The carotid upstroke is normal in amplitude and contour without delay or bruit  Peripheral pulses are symmetrical and full   Abdomen:  No masses or tenderness  Bowel sounds present  Extremities:   No cyanosis or clubbing   No lower extremity edema   Skin: warm and dry  Neurological:  Alert and oriented  Moves all extremities well  No abnormalities of mood, affect, memory, mentation, or behavior are noted    Data    Echo 10/17/2022:   Summary   Technically difficult study due to poor acoustic window and patient on vent. Difficult to assess left ventricular systolic function due to arrhythmia but   appears moderately reduced with an ejection fraction of 35% (better assessed   on definity images, however ectopy makes assessment of lv function   difficult)   Normal left ventricular size with mild concentric left ventricular   hypertrophy.    Left ventricular diastolic filling pressure is normal     All labs and testing reviewed.   Lab Review     Renal Profile:   Lab Results   Component Value Date/Time    CREATININE 1.1 10/19/2022 04:03 AM    BUN 30 10/19/2022 04:03 AM     10/19/2022 04:03 AM    K 4.7 10/19/2022 04:03 AM    CL 93 10/19/2022 04:03 AM    CO2 22 10/19/2022 04:03 AM     CBC:    Lab Results   Component Value Date/Time    WBC 6.4 10/19/2022 04:03 AM    RBC 3.44 10/19/2022 04:03 AM    HGB 10.0 10/19/2022 04:03 AM    HCT 30.9 10/19/2022 04:03 AM    MCV 89.8 10/19/2022 04:03 AM    RDW 16.2 10/19/2022 04:03 AM     10/19/2022 04:03 AM     BNP:  No results found for: BNP  Fasting Lipid Panel:    Lab Results   Component Value Date/Time    CHOL 109 10/11/2022 03:56 PM    HDL 61 10/11/2022 03:56 PM    TRIG 56 10/11/2022 03:56 PM     Cardiac Enzymes:  CK/MbTroponin  Lab Results   Component Value Date/Time    TROPONINI <0.01 10/15/2022 10:00 PM     PT/ INR   Lab Results   Component Value Date/Time    INR 1.18 10/19/2022 04:03 AM    INR 1.15 10/18/2022 03:43 AM    INR 1.10 10/17/2022 04:06 AM    PROTIME 14.9 10/19/2022 04:03 AM    PROTIME 14.7 10/18/2022 03:43 AM    PROTIME 14.1 10/17/2022 04:06 AM     PTT No results found for: PTT   Lab Results   Component Value Date/Time    MG 2.20 10/19/2022 04:03 AM      Lab Results   Component Value Date/Time    TSH 1.82 10/11/2022 03:56 PM       Assessment:  Complete heart block with junctional escape: improved    -s/p temporary venous pacemaker placement upon admission  Paroxysmal atrial fibrillation: stable   -[id number] score 4 (age, CHF, HTN)   First degree AVB   Frequent PAC's: stable   Frequent PVC's  LBBB  Chronic systolic CHF: compensated   Non-ischemic cardiomyopathy: ongoing   -EF 35% 10/2022        -has declined ICD in the past   HTN: hypotensive this admission   Acute respiratory failure requiring intubation   Hyperkalemia   Hyponatremia   Hypoglycemia    AUSTIN/CKD  BPH  Anemia   Hypotension   Hypothermia Schizophrenia     Plan:   1. Heart rates have been normal with dopamine drip turned off. At this time, no indication for permanent pacing. Long discussion with sister/POA yesterday who would like to avoid PPM if possible given patient's mental illness and tendency to over fixate. Will continue to monitor closely on telemetry once extubated and fully awake. 2. Can trial low dose beta blocker for HR control if he becomes tachycardic   3. Given atrial fibrillation and stroke risk, would like to start anticoagulation for stroke prophylaxis. Sister reported that in the past he was not on anticoagulation as episodes of atrial fibrillation were brief and he had a significant history of falling. More recently, he has been confined to a wheelchair and has been nonambulatory. She will consider and let us know. Plan thoroughly reviewed with Dr. Kisha Baker.        Rick Christine, CHRISTIAN-CNP  [facility], [place]  [phone]

## 2022-10-19 NOTE — PLAN OF CARE
Problem: Cardiovascular - Adult  Goal: Absence of cardiac dysrhythmias or at baseline  Outcome: Progressing   Monitor cardiac rate and rhythm   Assess for signs of decreased cardiac output   Administer antiarrhythmia medication and electrolyte replacement as ordered     Problem: Musculoskeletal - Adult  Goal: Return mobility to safest level of function  Outcome: Progressing   Assess patient stability and activity tolerance for standing, transferring and ambulating with or without assistive devices   Ensure adequate protection for wounds/incisions during mobilization   Assist with transfers and ambulation using safe patient handling equipment as needed    Problem: Safety - Medical Restraint  Goal: Remains free of injury from restraints (Restraint for Interference with Medical Device)  Outcome: Progressing   Determine that other, less restrictive measures have been tried or would not be effective before applying the restraint   Inform patient/family regarding the reason for restraint   Evaluate the patient's condition at the time of restraint application   Every 2 hours: Monitor safety, psychosocial status, comfort, nutrition and hydration    Problem: Pain  Goal: Verbalizes/displays adequate comfort level or baseline comfort level     Outcome: Progressing     Problem: Neurosensory - Adult  Goal: Achieves stable or improved neurological status  Outcome: Progressing   Assess for and report changes in neurological status   Initiate measures to prevent increased intracranial pressure   Monitor temperature, glucose, and sodium. Initiate appropriate interventions as ordered   Maintain blood pressure and fluid volume within ordered parameters to optimize cerebral perfusion and minimize risk of hemorrhage  Goal: Absence of seizures  Monitor for seizure activity.   If seizure occurs, document type and location of movements and any associated apnea  Outcome: Progressing  Goal: Remains free of injury related to seizures activity            Maintain airway, patient safety  and administer oxygen as ordered        Outcome: Progressing  Goal: Achieves maximal functionality and self care  Monitor swallowing and airway patency with patient fatigue and changes in neurological status  Outcome: Progressing     Problem: Respiratory - Adult  Goal: Achieves optimal ventilation and oxygenation  Outcome: Progressing     Problem: Cardiovascular - Adult  Goal: Maintains optimal cardiac output and hemodynamic stability  Outcome: Progressing   Monitor blood pressure and heart rate   Monitor urine output and notify Licensed Independent Practitioner for values outside of normal range   Assess for signs of decreased cardiac output   Administer fluid and/or volume expanders as ordered  Goal: Absence of cardiac dysrhythmias or at baseline  Outcome: Progressing   Monitor cardiac rate and rhythm   Assess for signs of decreased cardiac output   Administer antiarrhythmia medication and electrolyte replacement as ordered     Problem: Skin/Tissue Integrity - Adult  Goal: Skin integrity remains intact  Outcome: Progressing   Monitor for areas of redness and/or skin breakdown   Assess vascular access sites hourly   Every 4-6 hours minimum: Change oxygen saturation probe site  Goal: Incisions, wounds, or drain sites healing without S/S of infection  Outcome: Progressing   TWICE DAILY: Assess and document skin integrity   ADMISSION and DAILY: Assess and document risk factors for pressure ulcer development   Initiate pressure ulcer prevention bundle as indicated  Goal: Oral mucous membranes remain intact  Outcome: Progressing   Assess oral mucosa and hygiene practices   Implement preventative oral hygiene regimen   Implement oral medicated treatments as ordered     Problem: Musculoskeletal - Adult  Goal: Return mobility to safest level of function  Outcome: Progressing   Assess patient stability and activity tolerance for standing, transferring and ambulating with or without assistive devices   Ensure adequate protection for wounds/incisions during mobilization   Assist with transfers and ambulation using safe patient handling equipment as needed  Goal: Maintain proper alignment of affected body part   Support and protect limb and body alignment per provider's orders   Instruct and reinforce with patient and family use of appropriate assistive device and precautions (e.g. spinal or hip dislocation precautions)  Outcome: Progressing  Goal: Return ADL status to a safe level of function  Outcome: Progressing     Problem: Genitourinary - Adult  Goal: Absence of urinary retention  Outcome: Progressing  Goal: Urinary catheter remains patent  Outcome: Progressing  Assess patency of urinary catheter  Outcome: Progressing     Problem: Hematologic - Adult  Goal: Maintains hematologic stability  Outcome: Progressing   Assess for signs and symptoms of bleeding or hemorrhage   Monitor labs for bleeding or clotting disorders   Administer blood products/factors as ordered     Problem: Nutrition Deficit:  Goal: Optimize nutritional status  Outcome: Progressing     Problem: Safety - Adult  Goal: Free from fall injury  Outcome: Progressing     Problem: Discharge Planning  Goal: Discharge to home or other facility with appropriate resources  Outcome: Progressing   Identify barriers to discharge with patient and caregiver   Identify discharge learning needs (meds, wound care, etc)

## 2022-10-20 LAB
A/G RATIO: 1 (ref 1.1–2.2)
ALBUMIN SERPL-MCNC: 2.6 G/DL (ref 3.4–5)
ALP BLD-CCNC: 143 U/L (ref 40–129)
ALT SERPL-CCNC: 56 U/L (ref 10–40)
ANION GAP SERPL CALCULATED.3IONS-SCNC: 10 MMOL/L (ref 3–16)
ANISOCYTOSIS: ABNORMAL
AST SERPL-CCNC: 48 U/L (ref 15–37)
BANDED NEUTROPHILS RELATIVE PERCENT: 6 % (ref 0–7)
BASE EXCESS ARTERIAL: -0.3 MMOL/L (ref -3–3)
BASE EXCESS ARTERIAL: 1 (ref -3–3)
BASOPHILS ABSOLUTE: 0 K/UL (ref 0–0.2)
BASOPHILS RELATIVE PERCENT: 0 %
BILIRUB SERPL-MCNC: 0.5 MG/DL (ref 0–1)
BLOOD CULTURE, ROUTINE: NORMAL
BUN BLDV-MCNC: 35 MG/DL (ref 7–20)
CALCIUM SERPL-MCNC: 8.8 MG/DL (ref 8.3–10.6)
CARBOXYHEMOGLOBIN ARTERIAL: 0.6 % (ref 0–1.5)
CHLORIDE BLD-SCNC: 96 MMOL/L (ref 99–110)
CO2: 26 MMOL/L (ref 21–32)
CREAT SERPL-MCNC: 1.1 MG/DL (ref 0.8–1.3)
CULTURE, BLOOD 2: NORMAL
EOSINOPHILS ABSOLUTE: 0 K/UL (ref 0–0.6)
EOSINOPHILS RELATIVE PERCENT: 0 %
GFR SERPL CREATININE-BSD FRML MDRD: >60 ML/MIN/{1.73_M2}
GLUCOSE BLD-MCNC: 83 MG/DL (ref 70–99)
GLUCOSE BLD-MCNC: 92 MG/DL (ref 70–99)
HCO3 ARTERIAL: 24.1 MMOL/L (ref 21–29)
HCO3 ARTERIAL: 24.3 MMOL/L (ref 21–29)
HCT VFR BLD CALC: 28.2 % (ref 40.5–52.5)
HEMOGLOBIN, ART, EXTENDED: 13.5 G/DL (ref 13.5–17.5)
HEMOGLOBIN: 9.5 G/DL (ref 13.5–17.5)
INR BLD: 1.11 (ref 0.87–1.14)
LYMPHOCYTES ABSOLUTE: 0.4 K/UL (ref 1–5.1)
LYMPHOCYTES RELATIVE PERCENT: 6 %
MAGNESIUM: 2.1 MG/DL (ref 1.8–2.4)
MCH RBC QN AUTO: 29.8 PG (ref 26–34)
MCHC RBC AUTO-ENTMCNC: 33.6 G/DL (ref 31–36)
MCV RBC AUTO: 88.8 FL (ref 80–100)
METAMYELOCYTES RELATIVE PERCENT: 3 %
METHEMOGLOBIN ARTERIAL: 0.7 %
MONOCYTES ABSOLUTE: 0.9 K/UL (ref 0–1.3)
MONOCYTES RELATIVE PERCENT: 13 %
NEUTROPHILS ABSOLUTE: 5.8 K/UL (ref 1.7–7.7)
NEUTROPHILS RELATIVE PERCENT: 72 %
O2 SAT, ARTERIAL: 97.3 %
O2 SAT, ARTERIAL: 98 % (ref 93–100)
O2 THERAPY: NORMAL
ORGANISM: ABNORMAL
PCO2 ARTERIAL: 33.6 MM HG (ref 35–45)
PCO2 ARTERIAL: 38.4 MMHG (ref 35–45)
PDW BLD-RTO: 16.2 % (ref 12.4–15.4)
PERFORMED ON: ABNORMAL
PH ARTERIAL: 7.42 (ref 7.35–7.45)
PH ARTERIAL: 7.47 (ref 7.35–7.45)
PHOSPHORUS: 4.2 MG/DL (ref 2.5–4.9)
PLATELET # BLD: 109 K/UL (ref 135–450)
PLATELET SLIDE REVIEW: ABNORMAL
PMV BLD AUTO: 8.1 FL (ref 5–10.5)
PO2 ARTERIAL: 101.1 MM HG (ref 75–108)
PO2 ARTERIAL: 99.3 MMHG (ref 75–108)
POC SAMPLE TYPE: ABNORMAL
POTASSIUM REFLEX MAGNESIUM: 4.2 MMOL/L (ref 3.5–5.1)
PROTHROMBIN TIME: 14.2 SEC (ref 11.7–14.5)
RBC # BLD: 3.18 M/UL (ref 4.2–5.9)
SODIUM BLD-SCNC: 132 MMOL/L (ref 136–145)
TCO2 ARTERIAL: 25 MMOL/L
TCO2 ARTERIAL: 25.2 MMOL/L
TOTAL PROTEIN: 5.3 G/DL (ref 6.4–8.2)
URINE CULTURE, ROUTINE: ABNORMAL
WBC # BLD: 7.1 K/UL (ref 4–11)

## 2022-10-20 PROCEDURE — 94003 VENT MGMT INPAT SUBQ DAY: CPT

## 2022-10-20 PROCEDURE — 82803 BLOOD GASES ANY COMBINATION: CPT

## 2022-10-20 PROCEDURE — 84100 ASSAY OF PHOSPHORUS: CPT

## 2022-10-20 PROCEDURE — 6360000002 HC RX W HCPCS: Performed by: INTERNAL MEDICINE

## 2022-10-20 PROCEDURE — 6370000000 HC RX 637 (ALT 250 FOR IP): Performed by: INTERNAL MEDICINE

## 2022-10-20 PROCEDURE — 94761 N-INVAS EAR/PLS OXIMETRY MLT: CPT

## 2022-10-20 PROCEDURE — 99233 SBSQ HOSP IP/OBS HIGH 50: CPT | Performed by: NURSE PRACTITIONER

## 2022-10-20 PROCEDURE — 2000000000 HC ICU R&B

## 2022-10-20 PROCEDURE — 82947 ASSAY GLUCOSE BLOOD QUANT: CPT

## 2022-10-20 PROCEDURE — 36415 COLL VENOUS BLD VENIPUNCTURE: CPT

## 2022-10-20 PROCEDURE — 80053 COMPREHEN METABOLIC PANEL: CPT

## 2022-10-20 PROCEDURE — 2700000000 HC OXYGEN THERAPY PER DAY

## 2022-10-20 PROCEDURE — 99291 CRITICAL CARE FIRST HOUR: CPT | Performed by: INTERNAL MEDICINE

## 2022-10-20 PROCEDURE — 85610 PROTHROMBIN TIME: CPT

## 2022-10-20 PROCEDURE — 85025 COMPLETE CBC W/AUTO DIFF WBC: CPT

## 2022-10-20 PROCEDURE — C9113 INJ PANTOPRAZOLE SODIUM, VIA: HCPCS | Performed by: INTERNAL MEDICINE

## 2022-10-20 PROCEDURE — 83735 ASSAY OF MAGNESIUM: CPT

## 2022-10-20 PROCEDURE — 2580000003 HC RX 258: Performed by: INTERNAL MEDICINE

## 2022-10-20 RX ADMIN — CEFTRIAXONE SODIUM 1000 MG: 1 INJECTION, POWDER, FOR SOLUTION INTRAMUSCULAR; INTRAVENOUS at 14:24

## 2022-10-20 RX ADMIN — MUPIROCIN: 20 OINTMENT TOPICAL at 08:45

## 2022-10-20 RX ADMIN — CARBOXYMETHYLCELLULOSE SODIUM 1 DROP: 10 GEL OPHTHALMIC at 00:00

## 2022-10-20 RX ADMIN — HEPARIN SODIUM 5000 UNITS: 5000 INJECTION INTRAVENOUS; SUBCUTANEOUS at 05:45

## 2022-10-20 RX ADMIN — MUPIROCIN: 20 OINTMENT TOPICAL at 20:49

## 2022-10-20 RX ADMIN — HEPARIN SODIUM 5000 UNITS: 5000 INJECTION INTRAVENOUS; SUBCUTANEOUS at 14:25

## 2022-10-20 RX ADMIN — Medication 15 ML: at 20:49

## 2022-10-20 RX ADMIN — PANTOPRAZOLE SODIUM 40 MG: 40 INJECTION, POWDER, FOR SOLUTION INTRAVENOUS at 10:14

## 2022-10-20 RX ADMIN — CEFEPIME 2000 MG: 2 INJECTION, POWDER, FOR SOLUTION INTRAVENOUS at 05:45

## 2022-10-20 RX ADMIN — ASPIRIN 81 MG 81 MG: 81 TABLET ORAL at 10:14

## 2022-10-20 RX ADMIN — CARBOXYMETHYLCELLULOSE SODIUM 1 DROP: 10 GEL OPHTHALMIC at 10:14

## 2022-10-20 RX ADMIN — Medication 15 ML: at 08:44

## 2022-10-20 RX ADMIN — CARBOXYMETHYLCELLULOSE SODIUM 1 DROP: 10 GEL OPHTHALMIC at 04:00

## 2022-10-20 RX ADMIN — HEPARIN SODIUM 5000 UNITS: 5000 INJECTION INTRAVENOUS; SUBCUTANEOUS at 20:49

## 2022-10-20 RX ADMIN — LEVOTHYROXINE SODIUM 125 MCG: 0.12 TABLET ORAL at 05:45

## 2022-10-20 RX ADMIN — SENNOSIDES 8.6 MG: 8.6 TABLET, COATED ORAL at 10:14

## 2022-10-20 RX ADMIN — DOCUSATE SODIUM 100 MG: 50 LIQUID ORAL at 10:14

## 2022-10-20 ASSESSMENT — PULMONARY FUNCTION TESTS
PIF_VALUE: 17
PIF_VALUE: 12
PIF_VALUE: 15
PIF_VALUE: 16
PIF_VALUE: 15
PIF_VALUE: 13
PIF_VALUE: 15
PIF_VALUE: 13
PIF_VALUE: 16
PIF_VALUE: 14

## 2022-10-20 ASSESSMENT — PAIN SCALES - GENERAL: PAINLEVEL_OUTOF10: 0

## 2022-10-20 NOTE — PROGRESS NOTES
Pt tolerated SBT well. Pt suctioned orally and down ETT. Per Dr. Abimael Pichardo verbal order, pt was extubated to 4L nasal cannula. Spo2 100%, will continue to monitor.

## 2022-10-20 NOTE — PROGRESS NOTES
EP at bedside and MD pulled out the RT jugular Transvenous Pacing wires. HR at 105-106, patient tolerated well. Sister updated. Will follow.

## 2022-10-20 NOTE — PROGRESS NOTES
INPATIENT PULMONARY CRITICAL CARE PROGRESS NOTE      Reason for visit    hypothermia, hypotensive and bradycardic status post urgent temporary transvenous pacemaker placement    SUBJECTIVE: Patient when seen this morning continues to be critically ill on mechanical vent support, patient underwent therapeutic bronchoscopy and large amounts of mucous plugs were lavaged out, patient has been modest secretions endotracheal tube now, patient when seen this morning was having a T-max of 99.9 degrees Fahrenheit, patient had sinus rhythm on the monitor which was ranging from normal sinus rhythm to sinus tachycardia, patient was on 40% oxygen and PEEP of 5 to maintain oxygen saturation, patient's glycemic control was acceptable, patient has had good urine output overnight with cumulative balance of +166 mL,;no other pertinenet ROS of concern          Physical Exam:  Blood pressure 115/66, pulse 100, temperature 99.6 °F (37.6 °C), temperature source Bladder, resp. rate 14, height 5' 8\" (1.727 m), weight 201 lb 11.5 oz (91.5 kg), SpO2 97 %.'     Constitutional:  No acute distress. On mechanical vent support  HENT: Endotracheal tube present no thyromegaly. Eyes:  Conjunctivae are normal. Pupils equal, round, and reactive to light. No scleral icterus. Neck: . No tracheal deviation present. No obvious thyroid mass. Cardiovascular: Paced rhythm normal heart sounds. No right ventricular heave. No lower extremity edema. Pulmonary/Chest: No wheezes. decreased  rales. Chest wall is not dull to percussion. No accessory muscle usage or stridor. Abdominal: Soft. Bowel sounds present. Distended with distinctive right lumbar and right lower lower quadrant tenderness. Musculoskeletal: No cyanosis. No clubbing. No obvious joint deformity. Lymphadenopathy: No cervical or supraclavicular adenopathy. Skin: Skin is warm and dry. No rash or nodules on the exposed extremities.   No  Neurologic communicative on the vent    Results:  CBC:   Recent Labs     10/18/22  0343 10/19/22  0403 10/20/22  0421   WBC 7.2 6.4 7.1   HGB 10.9* 10.0* 9.5*   HCT 33.3* 30.9* 28.2*   MCV 89.7 89.8 88.8   * 110* 109*       BMP:   Recent Labs     10/18/22  0343 10/19/22  0403 10/20/22  0421   * 129* 132*   K 4.4 4.7 4.2   CL 92* 93* 96*   CO2 23 22 26   PHOS 4.5 4.8 4.2   BUN 33* 30* 35*   CREATININE 1.3 1.1 1.1       LIVER PROFILE:   Recent Labs     10/18/22  0343 10/19/22  0403 10/20/22  0421   AST 51* 96* 48*   ALT 49* 82* 56*   BILITOT 0.4 0.5 0.5   ALKPHOS 125 157* 143*       PT/INR:   Recent Labs     10/18/22  0343 10/19/22  0403 10/20/22  0421   PROTIME 14.7* 14.9* 14.2   INR 1.15* 1.18* 1.11           Imaging:  I have reviewed radiology images personally. CT ABDOMEN PELVIS WO CONTRAST Additional Contrast? None   Final Result   Elevation of the right hemidiaphragm with right middle lobe and right lower   lobe consolidation/atelectasis. This could represent pneumonia. Small right   pleural effusion. Small left pleural effusion with some compressive   atelectasis of the left lower lobe. Moderate cardiomegaly with coronary artery calcifications. Calcified gallstones in the gallbladder. The gallbladder is contracted with   apparent mural calcifications, in keeping with a porcelain gallbladder. This   is associated with increased incidence of gallbladder carcinoma. Surgical   consultation recommended. Moderate amount of fecal material in the left colon, with mild rectal fecal   impaction. Findings are in keeping with constipation. 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 XR CHEST PORTABLE   Final Result   Supportive tubing projects in normal position. Low lung volumes. Right basilar opacity, indeterminate for atelectasis or pneumonia/aspiration   pneumonitis. XR CHEST PORTABLE   Final Result   Endotracheal tube tip is 3 cm above the ade.   Enteric tube tip is within   the stomach with side port just below the gastroesophageal junction. XR ABDOMEN (KUB) (SINGLE AP VIEW)   Final Result   Endotracheal tube tip is 3 cm above the ade. Enteric tube tip is within   the stomach with side port just below the gastroesophageal junction. CULTURE, RESPIRATORY >100,000 CFU/mL Normal respiratory emmanuel Abnormal   [facility]   Gram Stain Result 4+ WBC's (Polymorphonuclear)   1+ WBC's (Mononuclear)   2+ Gram positive cocci   2+ Gram positive rods  [facility]   Organism Staphylococcus aureus Abnormal   [facility]   CULTURE, RESPIRATORY >100,000 CFU/mL  [address] [facility]        Susceptibility    Staphylococcus aureus (1)    Antibiotic Interpretation Microscan  Method Status    ceFAZolin Sensitive <=4 mcg/mL BACTERIAL SUSCEPTIBILITY PANEL BY MAGALIE     clindamycin Sensitive <=0.5 mcg/mL BACTERIAL SUSCEPTIBILITY PANEL BY MAGALIE     erythromycin Sensitive <=0.5 mcg/mL BACTERIAL SUSCEPTIBILITY PANEL BY MAGALIE     oxacillin Sensitive 0.5 mcg/mL BACTERIAL SUSCEPTIBILITY PANEL BY MAGALIE     tetracycline Sensitive <=4 mcg/mL BACTERIAL SUSCEPTIBILITY PANEL BY MAGALIE     trimethoprim-sulfamethoxazole Sensitive                   Assessment:  Principal Problem:    Symptomatic bradycardia  Active Problems:    Schizoaffective disorder (HCC)    Paroxysmal atrial fibrillation (HCC)    Complete heart block (HCC)    Hypotension    Systolic heart failure (HCC)    Hyperkalemia    AUSTIN (acute kidney injury) ([address]    Hypoglycemia    Hypothermia    Hyponatremia    Pulmonary infiltrates    Shock circulatory (HCC)    Normocytic hypochromic anemia    Observed seizure-like activity (HCC)    Acute respiratory failure with hypoxia (HCC)    Pyuria    Aspiration into airway    Proteus mirabilis infection    Staphylococcal pneumonia ([address]    Cardiomyopathy ([address]  Resolved Problems:    * No resolved hospital problems.  *          Plan:   Ventilatory support to keep saturation between 90 and 94% only  Ventilatory settings and waveforms reviewed  Ventilatory changes made  Pulmonary toilet  Post therapeutic bronchoscopy  Got 1 dose of vancomycin empirically yesterday   Patient's lung culture shows patient to have MSSA  Patient was empirically started on cefepime which can be continued  Titration of antimicrobials as per the patient's clinical status and cultures  IV sedation to maintain patient ventilator synchrony  Titration of sedation as per RASS scores  Patient sedation has been decreased for SBT  CT of the abdomen was reviewed and the results were discussed with patient and family yesterday  Status post temporary transvenous pacemaker  Monitor cardiac rhythm and hemodynamics closely  EP [id number] not want to place any permanent pacemaker at this time as told to nursing  IV pressors, if required, to keep mean artery pressure more than 65 L and likely  Enteral feeds as per metabolic support  Random cortisol level was normal  Patient recent echocardiogram from care everywhere was reviewed  Monitor I/O  and BMP  Correct electrolytes on whenever necessary basis  Monitor for any worsening hypoglycemia  PUD and DVT prophylaxis        Case discussed with ICU team and family      Critical care time from the patient was 35 minutes exclusive of any procedures        Electronically signed by:  Maureen Ibrahim MD    10/20/2022    10:22 AM.

## 2022-10-20 NOTE — PROGRESS NOTES
Extubated and kept on regular nasal cannula at 4LPM. Tolerated well with SP02 95-96%. CLWR. Will continue to monitor.

## 2022-10-20 NOTE — PROCEDURES
Bronchoscopy note    ASA 4, intubated patient    Patient with acute respiratory failure with hypoxemia, seizure-like activity requiring intubation mechanical ventilator support, patient continues to have increased amount of secretions and mucous plugs in the endotracheal tube which are difficult to suction out which is preventing patient's liberation from the ventilator and given the patient's clinical status, it was decided to do a therapeutic bronchoscopy and for that reason the endoscopy team was requested to come to the bedside, after timeout, the bronchoscope was introduced through the endotracheal tube using a T-piece adapter, patient's entire distal trachea as well as right tracheobronchial tree and left lower lobe was full of thick mucous plugs which were quite viscous and tenacious and difficult to suction out and had to be removed piece by piece using Mucomyst and saline with improvement in the patency of the airways, patient did not have any apparent endobronchial lesion, patient tolerated the procedure well  Estimated blood loss was 0    Elian Spicer MD

## 2022-10-20 NOTE — CARE COORDINATION
LOS 2. Care managed by Kali Ellison. Extub today- now on 4L. From [place]- was trf here from [id number] [place] [address].. Select screening. CM following.   Josy Pandey RN

## 2022-10-20 NOTE — PROGRESS NOTES
10/20/22 1048   Patient Observation   Heart Rate 93   Resp 14   SpO2 97 %   Vent Information   Vent Mode CPAP/PS   Ventilator Settings   FiO2  40 %   PEEP/CPAP (cmH2O) 5   Pressure Support (cm H2O) 11 cm H2O   Vent Patient Data (Readings)   Vt Spont (mL) 571 mL   Vt (Measured) 565 mL   Peak Inspiratory Pressure (cmH2O) 17 cmH2O   Rate Measured 14 br/min   Minute Volume (L/min) 8.58 Liters   Mean Airway Pressure (cmH2O) 8.1 cmH20   I:E Ratio 1:3.40   Vent Alarm Settings   High Pressure (cmH2O) 40 cmH2O   Low Minute Volume (lpm) 2 L/min   High Minute Volume (lpm) 20 L/min   Low Exhaled Vt (ml) 200 mL   RR High (bpm) 40 br/min   Apnea (secs) 20 secs   Airway Clearance   Suction ET Tube   Subglottic Suction Done Yes   Suction Device Inline suction catheter   Sputum Method Obtained Endotracheal   Sputum Amount Large   Sputum Color/Odor Tan   Sputum Consistency Thick   ETT (adult)   Placement Date/Time: 10/16/22 1630   Present on Admission/Arrival: No  Placed By: Licensed provider  Placement Verified By: Colorimetric ETCO2 device; Chest X-ray; Auscultation  Preoxygenation: Yes  Mask Ventilation: Ventilated by mask (1)  Technique: D...    Secured At 23 cm   Measured From Lips   ETT Placement Center   Secured By Commercial tube rodriguez   Site Assessment Dry   Cuff Pressure 30 cm H2O   Weaning Parameters   RSBI (S)  25

## 2022-10-20 NOTE — PROGRESS NOTES
10/20/22 0325   Patient Observation   Heart Rate 94   Resp 16   SpO2 95 %   Vent Information   Equipment Changed HME   Vent Mode AC/PRVC   Ventilator Settings   FiO2  40 %   Resp Rate (Set) 15 bmp   PEEP/CPAP (cmH2O) 5   Vt (Set, mL) 500 mL   Vent Patient Data (Readings)   Vt (Measured) 532 mL   Peak Inspiratory Pressure (cmH2O) 13 cmH2O   Rate Measured 15 br/min   Minute Volume (L/min) 7.79 Liters   Mean Airway Pressure (cmH2O) 7.3 cmH20   I:E Ratio 1:2.50   I Time/ I Time % 1.15 s   Backup Apnea On   Vent Alarm Settings   High Pressure (cmH2O) 40 cmH2O   Low Minute Volume (lpm) 2 L/min   Low Exhaled Vt (ml) 200 mL   RR High (bpm) 40 br/min   Apnea (secs) 20 secs   Additional Respiratoray Assessments   Humidification Source HME   Ambu Bag With Mask At Bedside Yes   ETT (adult)   Placement Date/Time: 10/16/22 1630   Present on Admission/Arrival: No  Placed By: Licensed provider  Placement Verified By: Colorimetric ETCO2 device; Chest X-ray; Auscultation  Preoxygenation: Yes  Mask Ventilation: Ventilated by mask (1)  Technique: D...    Secured At 23 cm   Measured From Lips   ETT Placement Right   Secured By Commercial tube rodriguez   Site Assessment Dry   Skin Assessment   Skin Assessment Clean, dry, & intact

## 2022-10-20 NOTE — PROGRESS NOTES
Hospitalist Progress Note      PCP: [facility]    Date of Admission: 10/15/2022    Chief Complaint: respiratory failure s/p intubation. HPI   78 y.o. male. Patient has a h/o schizoaffective disorder. He lives in [place], [place]. He receives most of his medical care through the [place] in [place]. He was initially admitted to [facility] [address] [address] in [place], [place] for hallucinations and impulsive behavior. He was there for ten days then transferred to the [facility] geriatric inpatient psychiatry unit on 10/11. Around 9pm on 10/15 a code blue was called because the patient briefly went unresponsive. He had been bradycardic all day  He had been taking metoprolol chronically but this had been held throughout the day because of his bradycardia. His nurse was trying to check his vitals again as it appeared he was breathing abnormally. While vital signs were being checked the patient lost consciousness briefly (less than a minute). He awoke but was markedly bradycardic. He was sent to the ER where he was found to be hypothermic, hypotensive, and bradycardic to the 20s in complete heart block. He was transferred to [facility] for urgent transvenous pacing lead placement. Patient had a h/o chronic systolic HF, thought to be nonischemic, and paroxysmal afib. The patient required sedation during temporary pacing lead placement and then became agitated thereafter. He required restraints and was given IM geodon, so was not meaningfully interactive or able to provide any history. According to Dr. Alla Contreras notes the patient had already been difficult to understand at baseline. Subjective:   S/p extubation today. Off dopamine drip. NSR on tele monitor. Plan to remove transvenous pacer.   Swallow eval.   PT OT eval.     Medications:  Reviewed    Infusion Medications    norepinephrine Stopped (10/18/22 2200)    fentaNYL Stopped (10/19/22 0222) midazolam Stopped (10/18/22 1319)     Scheduled Medications    cefTRIAXone (ROCEPHIN) IV  1,000 mg IntraVENous Q24H    senna  1 tablet Per NG tube BID    docusate  100 mg Per NG tube BID    mupirocin   Nasal BID    chlorhexidine  15 mL Mouth/Throat BID    carboxymethylcellulose PF  1 drop Both Eyes 6 times per day    levothyroxine  125 mcg Oral Daily    atorvastatin  20 mg Oral Nightly    OLANZapine  20 mg Oral Nightly    heparin (porcine)  5,000 Units SubCUTAneous 3 times per day    pantoprazole  40 mg IntraVENous Daily    aspirin  81 mg Oral Daily     PRN Meds: potassium chloride **OR** potassium chloride, magnesium sulfate, sodium phosphate IVPB **OR** sodium phosphate IVPB **OR** sodium phosphate IVPB, ondansetron, acetaminophen **OR** acetaminophen, ziprasidone, melatonin, perflutren lipid microspheres      Intake/Output Summary (Last 24 hours) at 10/20/2022 1312  Last data filed at 10/20/2022 1235  Gross per 24 hour   Intake 370 ml   Output 1295 ml   Net -925 ml       Physical Exam Performed:    /69   Pulse (!) 111   Temp 99.9 °F (37.7 °C) (Bladder)   Resp 26   Ht 5' 8\" (1.727 m)   Wt 201 lb 11.5 oz (91.5 kg)   SpO2 93%   BMI 30.67 kg/m²     General appearance: Alert , in mild respiratory distress   HEENT: Pupils equal, round, and reactive to light. Conjunctivae/corneas clear. Neck: Supple, with full range of motion. No jugular venous distention. Trachea midline. Respiratory:  Normal respiratory effort. Clear to auscultation, bilaterally without Rales/Wheezes/Rhonchi. Cardiovascular: Regular rate and rhythm with normal S1/S2 without murmurs, rubs or gallops. Abdomen: Soft, non-tender, non-distended with normal bowel sounds. Musculoskeletal: No clubbing, cyanosis or edema bilaterally. Full range of motion without deformity. Skin: Skin color, texture, turgor normal.  No rashes or lesions. Neurologic:  Neurovascularly intact without any focal sensory/motor deficits.  Cranial nerves: II-XII intact, grossly non-focal.  Psychiatric: Alert and oriented, thought content appropriate, normal insight  Capillary Refill: Brisk, 3 seconds, normal   Peripheral Pulses: +2 palpable, equal bilaterally       Labs:   Recent Labs     10/18/22  0343 10/19/22  0403 10/20/22  0421   WBC 7.2 6.4 7.1   HGB 10.9* 10.0* 9.5*   HCT 33.3* 30.9* 28.2*   * 110* 109*     Recent Labs     10/18/22  0343 10/19/22  0403 10/20/22  0421   * 129* 132*   K 4.4 4.7 4.2   CL 92* 93* 96*   CO2 23 22 26   BUN 33* 30* 35*   CREATININE 1.3 1.1 1.1   CALCIUM 8.2* 8.6 8.8   PHOS 4.5 4.8 4.2     Recent Labs     10/18/22  0343 10/19/22  0403 10/20/22  0421   AST 51* 96* 48*   ALT 49* 82* 56*   BILITOT 0.4 0.5 0.5   ALKPHOS 125 157* 143*     Recent Labs     10/18/22  0343 10/19/22  0403 10/20/22  0421   INR 1.15* 1.18* 1.11     No results for input(s): Elena Hollow in the last 72 hours. Urinalysis:      Lab Results   Component Value Date/Time    NITRU POSITIVE 10/17/2022 04:15 AM    WBCUA 10-20 10/17/2022 04:15 AM    BACTERIA 2+ 10/17/2022 04:15 AM    RBCUA 21-50 10/17/2022 04:15 AM    BLOODU LARGE 10/17/2022 04:15 AM    SPECGRAV 1.010 10/17/2022 04:15 AM    GLUCOSEU Negative 10/17/2022 04:15 AM       Radiology:  CT ABDOMEN PELVIS WO CONTRAST Additional Contrast? None   Final Result   Elevation of the right hemidiaphragm with right middle lobe and right lower   lobe consolidation/atelectasis. This could represent pneumonia. Small right   pleural effusion. Small left pleural effusion with some compressive   atelectasis of the left lower lobe. Moderate cardiomegaly with coronary artery calcifications. Calcified gallstones in the gallbladder. The gallbladder is contracted with   apparent mural calcifications, in keeping with a porcelain gallbladder. This   is associated with increased incidence of gallbladder carcinoma. Surgical   consultation recommended.       Moderate amount of fecal material in the left colon, with mild rectal fecal   impaction. Findings are in keeping with constipation. 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 XR CHEST PORTABLE   Final Result   Supportive tubing projects in normal position. Low lung volumes. Right basilar opacity, indeterminate for atelectasis or pneumonia/aspiration   pneumonitis. XR CHEST PORTABLE   Final Result   Endotracheal tube tip is 3 cm above the ade. Enteric tube tip is within   the stomach with side port just below the gastroesophageal junction. XR ABDOMEN (KUB) (SINGLE AP VIEW)   Final Result   Endotracheal tube tip is 3 cm above the ade. Enteric tube tip is within   the stomach with side port just below the gastroesophageal junction. Assessment/Plan:    Active Hospital Problems    Diagnosis     Proteus mirabilis infection [A49.8]      Priority: Medium    Staphylococcal pneumonia ([address] [J15.20]      Priority: Medium    Pyuria [R82.81]      Priority: Medium    Aspiration into airway [T17.908A]      Priority: Medium    Complete heart block ([address] [I44.2]      Priority: Medium    Hypotension [I95.9]      Priority: Medium    Systolic heart failure ([address] [I50.20]      Priority: Medium    Symptomatic bradycardia [R00.1]      Priority: Medium    Hyperkalemia [E87.5]      Priority: Medium    AUSTIN (acute kidney injury) ([address] [N17.9]      Priority: Medium    Hypoglycemia [E16.2]      Priority: Medium    Hypothermia [T68. XXXA]      Priority: Medium    Hyponatremia [E87.1]      Priority: Medium    Pulmonary infiltrates [R91.8]      Priority: Medium    Shock circulatory ([address] [R57.9]      Priority: Medium    Normocytic hypochromic anemia [D50.9]      Priority: Medium    Observed seizure-like activity (HCC) [R56.9]      Priority: Medium    Acute respiratory failure with hypoxia (HCC) [J96.01]      Priority: Medium    Paroxysmal atrial fibrillation ([address] [I48.0]      Priority: Medium    Schizoaffective disorder [facility]) [F25.9]      Priority: Medium    Cardiomyopathy ([facility] [I42.9]      Acute hypoxemic respiratory failure   required mechanical ventilator   S/p extubation on 10/20/22  Management as per ICU team    Pneumonia  IV ceftriaxone    Bradycardia  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 Cardiology on board. Abnormal LFT  improving    AUSTIN  Monitor GFR, avoid nephrotoxic agent     Hypothermia  resolved     Acute on chronic Systolic HF  New EF 95%  C/w current meds      Schizoaffective D/O  -  Continue home olanzapine 20mg nightly, trazodone 50mg nightly.          DVT Prophylaxis: heparin   Diet: ADULT TUBE FEEDING; Nasogastric; Renal Formula; Continuous; 10; Yes; 10; Q 4 hours; 35; 30; Q 4 hours; Protein; 1 bottle of proteinex daily via syringe, 30 mL before and after administration  Code Status: Full Code  PT/OT Eval Status: yes    Dispo - Critically ill        Alberto Roth MD

## 2022-10-20 NOTE — PROGRESS NOTES
10/20/22 0750   Patient Observation   Heart Rate 98   Resp 15   SpO2 97 %   Breath Sounds   Right Upper Lobe Clear   Right Middle Lobe Clear   Right Lower Lobe Clear   Left Upper Lobe Clear   Left Lower Lobe Clear   Vent Information   Vent Mode AC/PRVC   Ventilator Settings   FiO2  40 %   Insp Time (sec) 115 sec   Resp Rate (Set) 15 bmp   PEEP/CPAP (cmH2O) 5   Vt (Set, mL) 500 mL   Vent Patient Data (Readings)   Vt (Measured) 527 mL   Peak Inspiratory Pressure (cmH2O) 14 cmH2O   Rate Measured 16 br/min   Minute Volume (L/min) 8.21 Liters   Mean Airway Pressure (cmH2O) 7.9 cmH20   I:E Ratio 1:2.40   I Time/ I Time % 1.15 s   Vent Alarm Settings   High Pressure (cmH2O) 40 cmH2O   Low Minute Volume (lpm) 2 L/min   High Minute Volume (lpm) 20 L/min   Low Exhaled Vt (ml) 200 mL   RR High (bpm) 40 br/min   Apnea (secs) 20 secs   Additional Respiratoray Assessments   Humidification Source HME   Ambu Bag With Mask At Bedside Yes   ETT (adult)   Placement Date/Time: 10/16/22 1630   Present on Admission/Arrival: No  Placed By: Licensed provider  Placement Verified By: Colorimetric ETCO2 device; Chest X-ray; Auscultation  Preoxygenation: Yes  Mask Ventilation: Ventilated by mask (1)  Technique: D...    Secured At 22 cm   Measured From Lips   ETT Placement Left   Secured By Commercial tube rodriguez   Site Assessment Dry   Cuff Pressure 30 cm H2O

## 2022-10-20 NOTE — PROGRESS NOTES
Shift: 2999-3022 Day Shift     Admitting diagnosis: Symptomatic Bradycardia    Presentation to hospital: Flight    Surgery: Yes - Transvenous Pacer R- Jugular    Nursing assessment at handoff  stable    Emergency Contact/POA: Dennie Ganong - Sister  Family updated: Yes - At bedside    Most recent vitals: BP (!) 104/49   Pulse 87   Temp 99.2 °F (37.3 °C) (Bladder)   Resp 13   Ht 5' 8\" (1.727 m)   Wt 205 lb 7.5 oz (93.2 kg)   SpO2 97%   BMI 31.24 kg/m²      Rhythm: Normal Sinus Rhythm , Sinus Tachycardia , and Ectopy      NC/HFNC-  vent  Respiratory support: - Ventilator Settings:    Vt (Set, mL): 500 mL  Resp Rate (Set): 15 bmp  FiO2 : 40 %    PEEP/CPAP (cmH2O): 5  Pressure Support: 10 cmH20    Vent days: Day 3      Increased O2 requirements: No    Admission weight Weight: 178 lb 9.2 oz (81 kg)  Today's weight   Wt Readings from Last 1 Encounters:   10/19/22 205 lb 7.5 oz (93.2 kg)         UOP >30ml/hr: Yes    Peterson need assessed each shift: Yes    Restraints: yes, bilateral wrist  Order current and documentation up to date? Yes    Lines/Drains  LDA Insertion Date Discontinued Date Dressing Changes   PIV  X3 10/15     TVP 10/15     Arterial       Peterson  10/15     Vas Cath      ETT       Surgical drains        Night Shift Hospitalist Interventions    Problem(Brief) Date Time Intervention Physician contacted                                               Drip rates at handoff:    norepinephrine Stopped (10/18/22 2200)    fentaNYL Stopped (10/19/22 0222)    midazolam Stopped (10/18/22 1319)       Hospital Course Daily Updates:  Admit Day# 2    10/17 Day Shift antibiotics added; bronch for aspiration. Tube feeds started at 10ml/hr. Admit Day 3:   - Sedation weaned, dopamine weaned. Plans to extubated and reevaluate need for potential permanent pacemaker. Trying to avoid due to patients tendency to hyperfixate. - Afib with PVCs    Admit Day 4:   - Dopamine off since 2100 10/18.  Requiring no pressors, HR . - Sedation off since 0200. Awake intermittently, following commands. - Thick secretions. Bronchoscopy completed, thick mucus plugs. - CT abd due to distention, no BM, and tenderness with palpation. Increased stool burden with gall bladder calcification. On bowel reg.   - SBT 1424 through end of shift. Tolerated fine. Plans to extubate tomorrow. Lab Data:   CBC:   Recent Labs     10/18/22  0343 10/19/22  0403   WBC 7.2 6.4   HGB 10.9* 10.0*   HCT 33.3* 30.9*   MCV 89.7 89.8   * 110*       BMP:    Recent Labs     10/18/22  0343 10/19/22  0403   * 129*   K 4.4 4.7   CO2 23 22   BUN 33* 30*   CREATININE 1.3 1.1       LIVR:   Recent Labs     10/18/22  0343 10/19/22  0403   AST 51* 96*   ALT 49* 82*       PT/INR:   Recent Labs     10/18/22  0343 10/19/22  0403   PROT 5.6* 5.4*   INR 1.15* 1.18*       APTT: No results for input(s): APTT in the last 72 hours.   ABG:   Recent Labs     10/17/22  0410 10/18/22  0635   PHART 7.438 7.380   MUV1DVS 42.8 49.2*   PO2ART 89.1 139.1*       Consults (if GI or Nephrology- which group?)-  Electrophysiology, pulm

## 2022-10-20 NOTE — PROGRESS NOTES
[address]     Electrophysiology                                     Progress Note    Admission date:  10/15/2022    Reason for follow up visit: CHB, AF     HPI/CC: Augusto Telles was admitted on 10/15/2022 from [facility] ED. He was a patient on the inpatient psychiatry unit at [facility]. He was found to be hypothermic and bradycardic. EKG showed CHB with junctional escape rhythm. He as transferred to [facility] and had a temporary venous pacemaker placed. Dopamine was started. Sedation and dopamine are off. Today he was extubated. Rhythm is AF with frequent PVC's. Subjective: Unable to assess. Vitals:  Blood pressure 119/69, pulse (!) 111, temperature 99.9 °F (37.7 °C), temperature source Bladder, resp. rate 26, height 5' 8\" (1.727 m), weight 201 lb 11.5 oz (91.5 kg), SpO2 93 %.   Temp  Av.4 °F (37.4 °C)  Min: 99 °F (37.2 °C)  Max: 99.9 °F (37.7 °C)  Pulse  Av  Min: 84  Max: 111  BP  Min: 93/52  Max: 126/61  SpO2  Av.7 %  Min: 93 %  Max: 98 %  FiO2   Av %  Min: 40 %  Max: 40 %    24 hour I/O    Intake/Output Summary (Last 24 hours) at 10/20/2022 1624  Last data filed at 10/20/2022 1424  Gross per 24 hour   Intake 370 ml   Output 1310 ml   Net -940 ml       Current Facility-Administered Medications   Medication Dose Route Frequency Provider Last Rate Last Admin    cefTRIAXone (ROCEPHIN) 1,000 mg in dextrose 5 % 50 mL IVPB mini-bag  1,000 mg IntraVENous Q24H Pedro Bennett MD   Stopped at 10/20/22 1454    senna (SENOKOT) tablet 8.6 mg  1 tablet Per NG tube BID Pedro Bennett MD   8.6 mg at 10/20/22 1014    docusate (COLACE) 50 MG/5ML liquid 100 mg  100 mg Per NG tube BID Pedro Bennett MD   100 mg at 10/20/22 1014    norepinephrine (LEVOPHED) 16 mg in dextrose 5 % 250 mL infusion  1-100 mcg/min IntraVENous Continuous Pedro Bennett MD   Held at 10/18/22 2200    mupirocin (BACTROBAN) 2 % ointment   Nasal BID Pedro Bennett MD   Given at 10/20/22 0845    chlorhexidine (PERIDEX) 0.12 % solution 15 mL  15 mL Mouth/Throat BID Ashlee Nielson MD   15 mL at 10/20/22 0844    potassium chloride 20 mEq/50 mL IVPB (Central Line)  20 mEq IntraVENous PRN Ginette Licea MD        Or    potassium chloride 10 mEq/100 mL IVPB (Peripheral Line)  10 mEq IntraVENous PRN Ginette iLcea MD        magnesium sulfate 1000 mg in dextrose 5% 100 mL IVPB  1,000 mg IntraVENous PRN Ginette Licea MD        sodium phosphate 10 mmol in sodium chloride 0.9 % 250 mL IVPB  10 mmol IntraVENous PRN Ginette Licea MD        Or    sodium phosphate 15 mmol in sodium chloride 0.9 % 250 mL IVPB  15 mmol IntraVENous PRN Ginette Licea MD        Or    sodium phosphate 20 mmol in sodium chloride 0.9 % 500 mL IVPB  20 mmol IntraVENous PRN Ginette Licea MD        ondansetron [facility]) injection 4 mg  4 mg IntraVENous Q6H PRN Ginette Licea MD        acetaminophen (TYLENOL) tablet 650 mg  650 mg Oral Q4H PRN Ginette Licea MD        Or    acetaminophen (TYLENOL) suppository 650 mg  650 mg Rectal Q4H PRN Ginette Licea MD        ziprasidone (GEODON) injection 10 mg  10 mg IntraMUSCular Q12H PRN Ginette Licea MD        levothyroxine (SYNTHROID) tablet 125 mcg  125 mcg Oral Daily Ginette Licea MD   125 mcg at 10/20/22 0545    melatonin tablet 3 mg  3 mg Oral Nightly PRN Ginette Licea MD        atorvastatin (LIPITOR) tablet 20 mg  20 mg Oral Nightly Ginette Licea MD   20 mg at 10/19/22 2106    OLANZapine (ZYPREXA) tablet 20 mg  20 mg Oral Nightly Ginette Licea MD   20 mg at 10/19/22 2152    fentaNYL (SUBLIMAZE) 1,000 mcg in sodium chloride 0.9% 100 mL infusion   mcg/hr IntraVENous Continuous Ashlee Nielson MD   Stopped at 10/19/22 0222    midazolam (VERSED) 100 mg in dextrose 5 % 100 mL infusion  1-10 mg/hr IntraVENous Continuous Ashlee Nielson MD   Stopped at 10/18/22 1319    heparin (porcine) injection 5,000 Units  5,000 Units SubCUTAneous 3 times per day Ashlee Nielson MD   5,000 Units at 10/20/22 1425    pantoprazole (PROTONIX) injection 40 mg  40 mg IntraVENous Daily Rose Mary Avery MD   40 mg at 10/20/22 1014    aspirin chewable tablet 81 mg  81 mg Oral Daily Tariq Haddox, DO   81 mg at 10/20/22 1014    perflutren lipid microspheres (DEFINITY) injection 1.65 mg  1.5 mL IntraVENous ONCE PRN Tariq Haddox, DO           Objective:     Telemetry monitor: AF    Physical Exam:  Constitutional and general appearance: cooperative, no distress, and appears stated age  HEENT: PERRL, no cervical lymphadenopathy. No masses palpable. Normal oral mucosa  Respiratory:  Normal excursion and expansion without use of accessory muscles  Resp auscultation: Normal breath sounds without wheezing, rhonchi, and rales  Cardiovascular: The apical impulse is not displaced  Heart tones are crisp and normal. irregular S1 and S2.  Jugular venous pulsation  unable to assess   The carotid upstroke is normal in amplitude and contour without delay or bruit  Peripheral pulses are symmetrical and full   Abdomen:  No masses or tenderness  Bowel sounds present  Extremities:   No cyanosis or clubbing   No lower extremity edema   Skin: warm and dry  Neurological:  Alert and oriented  Moves all extremities well  No abnormalities of mood, affect, memory, mentation, or behavior are noted    Data    Echo 10/17/2022:   Summary   Technically difficult study due to poor acoustic window and patient on vent. Difficult to assess left ventricular systolic function due to arrhythmia but   appears moderately reduced with an ejection fraction of 35% (better assessed   on definity images, however ectopy makes assessment of lv function   difficult)   Normal left ventricular size with mild concentric left ventricular   hypertrophy. Left ventricular diastolic filling pressure is normal     All labs and testing reviewed.   Lab Review     Renal Profile:   Lab Results   Component Value Date/Time    CREATININE 1.1 10/20/2022 04:21 AM    BUN 35 10/20/2022 04:21 AM  10/20/2022 04:21 AM    K 4.2 10/20/2022 04:21 AM    CL 96 10/20/2022 04:21 AM    CO2 26 10/20/2022 04:21 AM     CBC:    Lab Results   Component Value Date/Time    WBC 7.1 10/20/2022 04:21 AM    RBC 3.18 10/20/2022 04:21 AM    HGB 9.5 10/20/2022 04:21 AM    HCT 28.2 10/20/2022 04:21 AM    MCV 88.8 10/20/2022 04:21 AM    RDW 16.2 10/20/2022 04:21 AM     10/20/2022 04:21 AM     BNP:  No results found for: BNP  Fasting Lipid Panel:    Lab Results   Component Value Date/Time    CHOL 109 10/11/2022 03:56 PM    HDL 61 10/11/2022 03:56 PM    TRIG 56 10/11/2022 03:56 PM     Cardiac Enzymes:  CK/MbTroponin  Lab Results   Component Value Date/Time    TROPONINI <0.01 10/15/2022 10:00 PM     PT/ INR   Lab Results   Component Value Date/Time    INR 1.11 10/20/2022 04:21 AM    INR 1.18 10/19/2022 04:03 AM    INR 1.15 10/18/2022 03:43 AM    PROTIME 14.2 10/20/2022 04:21 AM    PROTIME 14.9 10/19/2022 04:03 AM    PROTIME 14.7 10/18/2022 03:43 AM     PTT No results found for: PTT   Lab Results   Component Value Date/Time    MG 2.10 10/20/2022 04:21 AM      Lab Results   Component Value Date/Time    TSH 1.82 10/11/2022 03:56 PM       Assessment:  Complete heart block with junctional escape: resolved   -s/p temporary venous pacemaker placement upon admission   -temporary wire pulled 10/20/2022  Paroxysmal atrial fibrillation: stable   -AUC8KL1medo score 4 (age, CHF, HTN)   First degree AVB   Frequent PAC's: stable   Frequent PVC's  LBBB  Chronic systolic CHF: compensated   Non-ischemic cardiomyopathy: ongoing   -EF 35% 10/2022        -has declined ICD in the past   HTN: hypotensive this admission   Acute respiratory failure requiring intubation    -extubated 10/20/2022  Hyperkalemia   Hyponatremia   Hypoglycemia    AUSTIN/CKD  BPH  Anemia   Hypotension   Hypothermia   Schizophrenia     Plan:   1. Heart rates have been normal with dopamine drip turned off. At this time, no indication for permanent pacing.  Long discussion with sister/POA yesterday who would like to avoid PPM if possible given patient's mental illness and tendency to over fixate. Temporary pacing wire pulled today by myself under the supervision of Dr. Qasim Dow   2. Given atrial fibrillation and stroke risk, would like to start anticoagulation for stroke prophylaxis. Sister reported that in the past he was not on anticoagulation as episodes of atrial fibrillation were brief and he had a significant history of falling. More recently, he has been confined to a wheelchair and has been nonambulatory. She will consider and let us know. 3. Can consider starting low dose beta blocker prior to discharge from hospital if necessary. Plan thoroughly reviewed with Dr. Qasim Dow.        Jayce Judd, APRN-CNP  [address]  [phone]

## 2022-10-21 ENCOUNTER — APPOINTMENT (OUTPATIENT)
Dept: GENERAL RADIOLOGY | Age: 77
DRG: 308 | End: 2022-10-21
Payer: MEDICARE

## 2022-10-21 LAB
A/G RATIO: 0.7 (ref 1.1–2.2)
ALBUMIN SERPL-MCNC: 2.6 G/DL (ref 3.4–5)
ALP BLD-CCNC: 130 U/L (ref 40–129)
ALT SERPL-CCNC: 41 U/L (ref 10–40)
ANION GAP SERPL CALCULATED.3IONS-SCNC: 11 MMOL/L (ref 3–16)
ANISOCYTOSIS: ABNORMAL
AST SERPL-CCNC: 38 U/L (ref 15–37)
BANDED NEUTROPHILS RELATIVE PERCENT: 7 % (ref 0–7)
BASOPHILS ABSOLUTE: 0 K/UL (ref 0–0.2)
BASOPHILS RELATIVE PERCENT: 0 %
BILIRUB SERPL-MCNC: 0.5 MG/DL (ref 0–1)
BUN BLDV-MCNC: 29 MG/DL (ref 7–20)
CALCIUM SERPL-MCNC: 9.3 MG/DL (ref 8.3–10.6)
CHLORIDE BLD-SCNC: 98 MMOL/L (ref 99–110)
CO2: 26 MMOL/L (ref 21–32)
CREAT SERPL-MCNC: 1 MG/DL (ref 0.8–1.3)
EOSINOPHILS ABSOLUTE: 0.1 K/UL (ref 0–0.6)
EOSINOPHILS RELATIVE PERCENT: 1 %
GFR SERPL CREATININE-BSD FRML MDRD: >60 ML/MIN/{1.73_M2}
GLUCOSE BLD-MCNC: 61 MG/DL (ref 70–99)
GLUCOSE BLD-MCNC: 65 MG/DL (ref 70–99)
GLUCOSE BLD-MCNC: 69 MG/DL (ref 70–99)
GLUCOSE BLD-MCNC: 70 MG/DL (ref 70–99)
GLUCOSE BLD-MCNC: 77 MG/DL (ref 70–99)
GLUCOSE BLD-MCNC: 90 MG/DL (ref 70–99)
GLUCOSE BLD-MCNC: 91 MG/DL (ref 70–99)
GLUCOSE BLD-MCNC: 94 MG/DL (ref 70–99)
HCT VFR BLD CALC: 27.9 % (ref 40.5–52.5)
HEMOGLOBIN: 9.4 G/DL (ref 13.5–17.5)
INR BLD: 1.12 (ref 0.87–1.14)
LYMPHOCYTES ABSOLUTE: 0.5 K/UL (ref 1–5.1)
LYMPHOCYTES RELATIVE PERCENT: 7 %
MAGNESIUM: 2 MG/DL (ref 1.8–2.4)
MCH RBC QN AUTO: 29.6 PG (ref 26–34)
MCHC RBC AUTO-ENTMCNC: 33.5 G/DL (ref 31–36)
MCV RBC AUTO: 88.5 FL (ref 80–100)
METAMYELOCYTES RELATIVE PERCENT: 1 %
MONOCYTES ABSOLUTE: 1 K/UL (ref 0–1.3)
MONOCYTES RELATIVE PERCENT: 13 %
MYELOCYTE PERCENT: 1 %
NEUTROPHILS ABSOLUTE: 5.8 K/UL (ref 1.7–7.7)
NEUTROPHILS RELATIVE PERCENT: 70 %
PDW BLD-RTO: 16.5 % (ref 12.4–15.4)
PERFORMED ON: ABNORMAL
PERFORMED ON: ABNORMAL
PERFORMED ON: NORMAL
PHOSPHORUS: 3.7 MG/DL (ref 2.5–4.9)
PLATELET # BLD: 112 K/UL (ref 135–450)
PLATELET SLIDE REVIEW: ABNORMAL
PMV BLD AUTO: 8 FL (ref 5–10.5)
POTASSIUM REFLEX MAGNESIUM: 4 MMOL/L (ref 3.5–5.1)
PROTHROMBIN TIME: 14.3 SEC (ref 11.7–14.5)
RBC # BLD: 3.16 M/UL (ref 4.2–5.9)
SODIUM BLD-SCNC: 135 MMOL/L (ref 136–145)
TOTAL PROTEIN: 6.1 G/DL (ref 6.4–8.2)
WBC # BLD: 7.4 K/UL (ref 4–11)

## 2022-10-21 PROCEDURE — 97530 THERAPEUTIC ACTIVITIES: CPT

## 2022-10-21 PROCEDURE — 97110 THERAPEUTIC EXERCISES: CPT

## 2022-10-21 PROCEDURE — 92610 EVALUATE SWALLOWING FUNCTION: CPT

## 2022-10-21 PROCEDURE — 6370000000 HC RX 637 (ALT 250 FOR IP): Performed by: INTERNAL MEDICINE

## 2022-10-21 PROCEDURE — 74018 RADEX ABDOMEN 1 VIEW: CPT

## 2022-10-21 PROCEDURE — 2580000003 HC RX 258: Performed by: INTERNAL MEDICINE

## 2022-10-21 PROCEDURE — 85025 COMPLETE CBC W/AUTO DIFF WBC: CPT

## 2022-10-21 PROCEDURE — 94761 N-INVAS EAR/PLS OXIMETRY MLT: CPT

## 2022-10-21 PROCEDURE — 36415 COLL VENOUS BLD VENIPUNCTURE: CPT

## 2022-10-21 PROCEDURE — 6360000002 HC RX W HCPCS: Performed by: INTERNAL MEDICINE

## 2022-10-21 PROCEDURE — 80053 COMPREHEN METABOLIC PANEL: CPT

## 2022-10-21 PROCEDURE — 85610 PROTHROMBIN TIME: CPT

## 2022-10-21 PROCEDURE — 99233 SBSQ HOSP IP/OBS HIGH 50: CPT | Performed by: INTERNAL MEDICINE

## 2022-10-21 PROCEDURE — 97163 PT EVAL HIGH COMPLEX 45 MIN: CPT

## 2022-10-21 PROCEDURE — 84100 ASSAY OF PHOSPHORUS: CPT

## 2022-10-21 PROCEDURE — 99233 SBSQ HOSP IP/OBS HIGH 50: CPT | Performed by: NURSE PRACTITIONER

## 2022-10-21 PROCEDURE — 2060000000 HC ICU INTERMEDIATE R&B

## 2022-10-21 PROCEDURE — 2700000000 HC OXYGEN THERAPY PER DAY

## 2022-10-21 PROCEDURE — C9113 INJ PANTOPRAZOLE SODIUM, VIA: HCPCS | Performed by: INTERNAL MEDICINE

## 2022-10-21 PROCEDURE — 92526 ORAL FUNCTION THERAPY: CPT

## 2022-10-21 PROCEDURE — 83735 ASSAY OF MAGNESIUM: CPT

## 2022-10-21 PROCEDURE — 97167 OT EVAL HIGH COMPLEX 60 MIN: CPT

## 2022-10-21 RX ORDER — MAGNESIUM SULFATE 1 G/100ML
1000 INJECTION INTRAVENOUS PRN
Status: DISCONTINUED | OUTPATIENT
Start: 2022-10-21 | End: 2022-10-28 | Stop reason: HOSPADM

## 2022-10-21 RX ORDER — LACTULOSE 10 G/15ML
20 SOLUTION ORAL 2 TIMES DAILY
Status: DISPENSED | OUTPATIENT
Start: 2022-10-21 | End: 2022-10-23

## 2022-10-21 RX ORDER — DEXTROSE MONOHYDRATE 100 MG/ML
INJECTION, SOLUTION INTRAVENOUS CONTINUOUS PRN
Status: DISCONTINUED | OUTPATIENT
Start: 2022-10-21 | End: 2022-10-28 | Stop reason: HOSPADM

## 2022-10-21 RX ORDER — OLANZAPINE 5 MG/1
20 TABLET, ORALLY DISINTEGRATING ORAL NIGHTLY
Status: DISCONTINUED | OUTPATIENT
Start: 2022-10-21 | End: 2022-10-28 | Stop reason: HOSPADM

## 2022-10-21 RX ORDER — DOCUSATE SODIUM 100 MG/1
100 CAPSULE, LIQUID FILLED ORAL DAILY
Status: DISCONTINUED | OUTPATIENT
Start: 2022-10-21 | End: 2022-10-21

## 2022-10-21 RX ADMIN — ACETAMINOPHEN 650 MG: 325 TABLET ORAL at 11:55

## 2022-10-21 RX ADMIN — HEPARIN SODIUM 5000 UNITS: 5000 INJECTION INTRAVENOUS; SUBCUTANEOUS at 06:50

## 2022-10-21 RX ADMIN — ASPIRIN 81 MG 81 MG: 81 TABLET ORAL at 11:55

## 2022-10-21 RX ADMIN — DOCUSATE SODIUM 100 MG: 50 LIQUID ORAL at 16:19

## 2022-10-21 RX ADMIN — DEXTROSE MONOHYDRATE 125 ML: 100 INJECTION, SOLUTION INTRAVENOUS at 08:08

## 2022-10-21 RX ADMIN — LEVOTHYROXINE SODIUM 125 MCG: 0.12 TABLET ORAL at 11:56

## 2022-10-21 RX ADMIN — MUPIROCIN: 20 OINTMENT TOPICAL at 08:15

## 2022-10-21 RX ADMIN — OLANZAPINE 20 MG: 5 TABLET, ORALLY DISINTEGRATING ORAL at 20:36

## 2022-10-21 RX ADMIN — CEFTRIAXONE SODIUM 1000 MG: 1 INJECTION, POWDER, FOR SOLUTION INTRAMUSCULAR; INTRAVENOUS at 17:12

## 2022-10-21 RX ADMIN — LACTULOSE 20 G: 20 SOLUTION ORAL at 16:09

## 2022-10-21 RX ADMIN — HEPARIN SODIUM 5000 UNITS: 5000 INJECTION INTRAVENOUS; SUBCUTANEOUS at 20:37

## 2022-10-21 RX ADMIN — METOPROLOL TARTRATE 12.5 MG: 25 TABLET, FILM COATED ORAL at 20:37

## 2022-10-21 RX ADMIN — DEXTROSE MONOHYDRATE 125 ML: 100 INJECTION, SOLUTION INTRAVENOUS at 11:55

## 2022-10-21 RX ADMIN — Medication 3 MG: at 20:37

## 2022-10-21 RX ADMIN — Medication 15 ML: at 08:15

## 2022-10-21 RX ADMIN — HEPARIN SODIUM 5000 UNITS: 5000 INJECTION INTRAVENOUS; SUBCUTANEOUS at 16:19

## 2022-10-21 RX ADMIN — DEXTROSE MONOHYDRATE 125 ML: 100 INJECTION, SOLUTION INTRAVENOUS at 17:01

## 2022-10-21 RX ADMIN — ATORVASTATIN CALCIUM 20 MG: 10 TABLET, FILM COATED ORAL at 20:37

## 2022-10-21 RX ADMIN — PANTOPRAZOLE SODIUM 40 MG: 40 INJECTION, POWDER, FOR SOLUTION INTRAVENOUS at 08:16

## 2022-10-21 RX ADMIN — METOPROLOL TARTRATE 12.5 MG: 25 TABLET, FILM COATED ORAL at 16:09

## 2022-10-21 RX ADMIN — LACTULOSE 20 G: 20 SOLUTION ORAL at 20:37

## 2022-10-21 NOTE — PROGRESS NOTES
x1 for dynamic exercises when sitting at edge of bed. With exrcises, pt was only able to follow one step commands from one individual. Stephen Michael, only caregiver (brother) is also in the hospital. At d/c, PT recommends SNF due to impairments noted above. Treatment Diagnosis: Symptomatic bradycardia  Therapy Prognosis: Good  Decision Making: high complexity   Requires PT Follow-Up: Yes  Activity Tolerance: Mid exercise HR increased to 120bpm but returned to norm with rest, SpO2 reduced to 92% but with rest returned to norm  Activity Tolerance: Patient tolerated treatment well;Patient limited by pain  Activity Tolerance Comments: Unable to stand due to tenderness of right ankle and wrist     Plan   Physcial Therapy Plan  General Plan: 5-7 times per week  Current Treatment Recommendations: Strengthening, ROM, Balance training, Functional mobility training, Transfer training, ADL/Self-care training, Cognitive/Perceptual training, Endurance training, Gait training, Stair training, Neuromuscular re-education, Patient/Caregiver education & training, Safety education & training, Pain management, Positioning, Therapeutic activities  Safety Devices  Type of Devices: All jv prominences offloaded, Call light within reach, Gait belt, Left in bed, Patient at risk for falls, Bed alarm in place (Pt LUE supported and slightly elevated on pillows to decrease swelling.)  Restraints  Restraints Initially in Place: No     Restrictions  Restrictions/Precautions  Restrictions/Precautions: Fall Risk, Up as Tolerated, Bed Alarm  Required Braces or Orthoses?: No     Subjective   Pain: \"a lot of pain\" throughout body, left ankle pain \"feels like needles\"   General  Chart Reviewed: Yes  Patient assessed for rehabilitation services?: Yes  Response To Previous Treatment: Not applicable  Family / Caregiver Present: No  Referring Practitioner:  Сергей Abrams MD  Referral Date : 10/20/22  Diagnosis: Symptomatic Bradycardia  Follows Commands: cannula  Comment: 4L O2     Observation/Palpation  Palpation: discoloration (redness), tender to palpation and warm to touch at left ankle and wrist/hand  Gross Assessment  AROM: Grossly decreased, non-functional  Strength: Grossly decreased, non-functional  Coordination: Grossly decreased, non-functional  Tone: Abnormal     Bed Mobility Training  Bed Mobility Training: Yes  Overall Level of Assistance: Assist X2;Maximum assistance  Interventions: Manual cues;Demonstration; Safety awareness training; Tactile cues; Verbal cues; Visual cues; Weight shifting training/pressure relief  Rolling: Maximum assistance;Assist X2  Supine to Sit: Assist X2;Maximum assistance  Sit to Supine: Assist X2;Maximum assistance  Scooting: Assist X2;Maximum assistance (required 3 scoots to reach edge of bed)  Balance  Sitting: Impaired  Sitting - Static:  (CGA)  Sitting - Dynamic: Supported sitting (Min-A x1)  Transfer Training  Transfer Training: No (Attempted to perform STS from EOB with Ax2; pt unable to achieve full stand; Performed 3 partial stands with scoot to R with max(A)x2)    [facility] 6 Clicks Inpatient Mobility:  AM-PAC Mobility Inpatient   How much difficulty turning over in bed?: A Lot  How much difficulty sitting down on / standing up from a chair with arms?: Unable  How much difficulty moving from lying on back to sitting on side of bed?: A Lot  How much help from another person moving to and from a bed to a chair?: Total  How much help from another person needed to walk in hospital room?: Total  How much help from another person for climbing 3-5 steps with a railing?: Total  -PAC Inpatient Mobility Raw Score : 8  AM-PAC Inpatient T-Scale Score : 28.52  Mobility Inpatient CMS 0-100% Score: 86.62  Mobility Inpatient CMS G-Code Modifier : CM    Goals  Short Term Goals  Time Frame for Short Term Goals: 7 days (10/28/22)  Short Term Goal 1: Pt will be able to transfer from supine to sit with Min-A of x2  Short Term Goal 2: Pt will be able to perform 10x BLE exercises with Min-A x1  Pt will tolerate sitting EOB x10 min with supervision  Short Term Goal 4: Pt will transfer from sitting at edge of bed to chair min-A x2 with RW (10/24/22)     Education  Patient Education  Education Given To: Patient  Education Provided: Role of Therapy;Plan of Care;Transfer Training;Orientation  Education Method: Demonstration;Verbal  Barriers to Learning: Cognition;Vision  Education Outcome: Verbalized understanding      Therapy Time   Individual Concurrent Group Co-treatment   Time In 0810         Time Out 0846         Minutes 36         Timed Code Treatment Minutes: [address]

## 2022-10-21 NOTE — PROGRESS NOTES
[facility]     Electrophysiology                                     Progress Note    Admission date:  10/15/2022    Reason for follow up visit: CHB, AF     HPI/CC: Karen Wallace was admitted on 10/15/2022 from [facility][1] ED. He was a patient on the inpatient psychiatry unit at [facility][1]. He was found to be hypothermic and bradycardic. EKG showed CHB with junctional escape rhythm. He as transferred to [facility] and had a temporary venous pacemaker placed. Dopamine was started. Sedation and dopamine are off. On 10/20/2022, he was extubated and temporary pacing wire was pulled. Rhythm is sinus with 1st degree AVB and frequent PVC's. Subjective: Unable to assess. Vitals:  Blood pressure 120/68, pulse 90, temperature 98.1 °F (36.7 °C), temperature source Oral, resp. rate 20, height 5' 8\" (1.727 m), weight 201 lb 11.5 oz (91.5 kg), SpO2 96 %.   Temp  Av.2 °F (37.3 °C)  Min: 98.1 °F (36.7 °C)  Max: 99.9 °F (37.7 °C)  Pulse  Av  Min: 90  Max: 108  BP  Min: 101/74  Max: 124/67  SpO2  Av.5 %  Min: 91 %  Max: 99 %    24 hour I/O    Intake/Output Summary (Last 24 hours) at 10/21/2022 1429  Last data filed at 10/21/2022 0900  Gross per 24 hour   Intake 153 ml   Output 1300 ml   Net -1147 ml       Current Facility-Administered Medications   Medication Dose Route Frequency Provider Last Rate Last Admin    glucose chewable tablet 16 g  4 tablet Oral PRN Delicia Nair MD        dextrose bolus 10% 125 mL  125 mL IntraVENous PRN Delicia Nair MD   Stopped at 10/21/22 1203    Or    dextrose bolus 10% 250 mL  250 mL IntraVENous PRN Delicia Nair MD        glucagon (rDNA) injection 1 mg  1 mg SubCUTAneous PRN Delicia Nair MD        dextrose 10 % infusion   IntraVENous Continuous PRN Delicia Nair MD        OLANZapine zydis (ZYPREXA) disintegrating tablet 20 mg  20 mg Oral Nightly Delicia Nair MD        magnesium sulfate 1000 mg in dextrose 5% 100 mL IVPB  1,000 mg IntraVENous PRN Delicia Nair MD lactulose (CHRONULAC) 10 GM/15ML solution 20 g  20 g Oral BID Luigi Hdz MD        docusate sodium (COLACE) capsule 100 mg  100 mg Oral Daily Luigi Hdz MD        metoprolol tartrate (LOPRESSOR) tablet 12.5 mg  12.5 mg Oral BID Luigi Hdz MD        cefTRIAXone (ROCEPHIN) 1,000 mg in dextrose 5 % 50 mL IVPB mini-bag  1,000 mg IntraVENous Q24H Iris Castillo MD   Stopped at 10/20/22 1454    mupirocin (BACTROBAN) 2 % ointment   Nasal BID Iris Castillo MD   Given at 10/21/22 0815    potassium chloride 20 mEq/50 mL IVPB (Central Line)  20 mEq IntraVENous PRN Wil Weiner MD        Or    potassium chloride 10 mEq/100 mL IVPB (Peripheral Line)  10 mEq IntraVENous PRN Wil Weiner MD        ondansetron [facility]) injection 4 mg  4 mg IntraVENous Q6H PRN Wil Weiner MD        acetaminophen (TYLENOL) tablet 650 mg  650 mg Oral Q4H PRN Wil Weiner MD   650 mg at 10/21/22 1155    Or    acetaminophen (TYLENOL) suppository 650 mg  650 mg Rectal Q4H PRN Wil Weiner MD        ziprasidone (GEODON) injection 10 mg  10 mg IntraMUSCular Q12H PRN Wil Weiner MD        levothyroxine (SYNTHROID) tablet 125 mcg  125 mcg Oral Daily Wil Weiner MD   125 mcg at 10/21/22 1156    melatonin tablet 3 mg  3 mg Oral Nightly PRN Wil Weiner MD        atorvastatin (LIPITOR) tablet 20 mg  20 mg Oral Nightly Wil Weiner MD   20 mg at 10/19/22 2106    heparin (porcine) injection 5,000 Units  5,000 Units SubCUTAneous 3 times per day Iris Castillo MD   5,000 Units at 10/21/22 0650    pantoprazole (PROTONIX) injection 40 mg  40 mg IntraVENous Daily Iris Castillo MD   40 mg at 10/21/22 0816    aspirin chewable tablet 81 mg  81 mg Oral Daily Tariq Haddox, DO   81 mg at 10/21/22 1155    perflutren lipid microspheres (DEFINITY) injection 1.65 mg  1.5 mL IntraVENous ONCE PRN Tariq Kapoordox, DO           Objective:     Telemetry monitor: SR, 1sr degree AVB, PVC's    Physical Exam:  Constitutional and general appearance: cooperative, no distress, and appears stated age  [de-identified]: PERRL, no cervical lymphadenopathy. No masses palpable. Normal oral mucosa  Respiratory:  Normal excursion and expansion without use of accessory muscles  Resp auscultation: Normal breath sounds without wheezing, rhonchi, and rales  Cardiovascular: The apical impulse is not displaced  Heart tones are crisp and normal. regular S1 and S2.  Jugular venous pulsation  unable to assess   The carotid upstroke is normal in amplitude and contour without delay or bruit  Peripheral pulses are symmetrical and full   Abdomen:  No masses or tenderness  Bowel sounds present  Extremities:   No cyanosis or clubbing   No lower extremity edema   Skin: warm and dry  Neurological:  Alert and oriented  Moves all extremities well  No abnormalities of mood, affect, memory, mentation, or behavior are noted    Data    Echo 10/17/2022:   Summary   Technically difficult study due to poor acoustic window and patient on vent. Difficult to assess left ventricular systolic function due to arrhythmia but   appears moderately reduced with an ejection fraction of 35% (better assessed   on definity images, however ectopy makes assessment of lv function   difficult)   Normal left ventricular size with mild concentric left ventricular   hypertrophy. Left ventricular diastolic filling pressure is normal     All labs and testing reviewed.   Lab Review     Renal Profile:   Lab Results   Component Value Date/Time    CREATININE 1.0 10/21/2022 04:15 AM    BUN 29 10/21/2022 04:15 AM     10/21/2022 04:15 AM    K 4.0 10/21/2022 04:15 AM    CL 98 10/21/2022 04:15 AM    CO2 26 10/21/2022 04:15 AM     CBC:    Lab Results   Component Value Date/Time    WBC 7.4 10/21/2022 04:15 AM    RBC 3.16 10/21/2022 04:15 AM    HGB 9.4 10/21/2022 04:15 AM    HCT 27.9 10/21/2022 04:15 AM    MCV 88.5 10/21/2022 04:15 AM    RDW 16.5 10/21/2022 04:15 AM     10/21/2022 04:15 AM     BNP:  No results found for: BNP  Fasting Lipid Panel:    Lab Results   Component Value Date/Time    CHOL 109 10/11/2022 03:56 PM    HDL 61 10/11/2022 03:56 PM    TRIG 56 10/11/2022 03:56 PM     Cardiac Enzymes:  CK/MbTroponin  Lab Results   Component Value Date/Time    TROPONINI <0.01 10/15/2022 10:00 PM     PT/ INR   Lab Results   Component Value Date/Time    INR 1.12 10/21/2022 04:15 AM    INR 1.11 10/20/2022 04:21 AM    INR 1.18 10/19/2022 04:03 AM    PROTIME 14.3 10/21/2022 04:15 AM    PROTIME 14.2 10/20/2022 04:21 AM    PROTIME 14.9 10/19/2022 04:03 AM     PTT No results found for: PTT   Lab Results   Component Value Date/Time    MG 2.00 10/21/2022 04:15 AM      Lab Results   Component Value Date/Time    TSH 1.82 10/11/2022 03:56 PM       Assessment:  Complete heart block with junctional escape: resolved   -s/p temporary venous pacemaker placement upon admission   -temporary wire pulled 10/20/2022  Paroxysmal atrial fibrillation: stable   -[id number] score 4 (age, CHF, HTN)    -not anticoagulated due to history of frequent falls   First degree AVB   Frequent PAC's: stable   Frequent PVC's  LBBB  Chronic systolic CHF: compensated   Non-ischemic cardiomyopathy: ongoing   -EF 35% 10/2022        -has declined ICD in the past   HTN: hypotensive this admission   Acute respiratory failure requiring intubation    -extubated 10/20/2022  Hyperkalemia   Hyponatremia   Hypoglycemia    AUSTIN/CKD  BPH  Anemia   Hypotension: BP improved but still soft    Hypothermia; resolved   Schizophrenia     Plan:   1. Heart rates have been normal with dopamine drip turned off. At this time, no indication for permanent pacing. Long discussion with sister/POA who would like to avoid PPM if possible given patient's mental illness and tendency to over fixate. Temporary pacing wire pulled 10/20/2022 by EP team.   2. Given atrial fibrillation and stroke risk, would like to start anticoagulation for stroke prophylaxis.  Sister reported that in the past he was not on anticoagulation as episodes of atrial fibrillation were brief and he had a significant history of falling. More recently, he has been confined to a wheelchair and has been nonambulatory. She will consider and let us know. 3. Can consider starting low dose beta blocker prior to discharge from hospital if necessary however rates have been stable on telemetry. 4. Event monitor at discharge     EP will follow peripherally over the weekend.        Fabiana Ballard, CHRISTIAN-CNP  [employer]  [phone]

## 2022-10-21 NOTE — PROGRESS NOTES
Comprehensive Nutrition Assessment    Type and Reason for Visit:  Reassess    Nutrition Recommendations/Plan:   Monitor ability to advance PO diet per SLP recommendations   If pt fails swallow eval, recommend placing NG tube and starting tube feeds   Recommend order \"Diet: Adult Tube Feed\". Initiate Jevity 1.5 (standard with fiber formula) at 20 mL/hr and as tolerated, increase by 20 mL/hr q 4 hours until goal of 60 mL/hr via NG. Recommend 60 mL H20 q 4 hours. Recommend reevaluate IV fluids at this time. Increase flush if Na+ increases greater than 145 mEq/L. Monitor for tolerance (bowel habits, N/V, cramping, abdominal exam findings: distended, firm, tense, guarded, discomfort). Monitor nutrition adequacy, pertinent labs, bowel habits, wt changes, and clinical progress    Malnutrition Assessment:  Malnutrition Status: At risk for malnutrition (Comment) (10/21/22 1021)    Context:  Acute Illness     Findings of the 6 clinical characteristics of malnutrition:  Energy Intake:  Mild decrease in energy intake (Comment)    Nutrition Assessment:    Follow up: Pt extubated on 10/20. Failed bedside swallow eval with RN. SLP to see patient today. Will monitor ability start PO diet. If pt fails swallow eval, plans to place NG and resume tube feeds. Tube feed recommendations updated d/t off propofol and extubated. Will monitor. Nutrition Related Findings:    Na 135. Hypoactive BS. +1 pitting generalized and BLE edema. +2 pitting BUE edema.  Wound Type: None       Current Nutrition Intake & Therapies:    Average Meal Intake: NPO  Average Supplements Intake: NPO  ADULT TUBE FEEDING; Nasogastric; Renal Formula; Continuous; 10; Yes; 10; Q 4 hours; 35; 30; Q 4 hours; Protein; 1 bottle of proteinex daily via syringe, 30 mL before and after administration  Current Tube Feeding (TF) Orders:  Feeding Route: Nasogastric  Formula: Standard with Fiber  Schedule: Continuous  Goal TF & Flush Orders Provides: Jevity 1.5 at goal rate of 60 mL/hr x20 hours to provide 1200 mL TV, 1800 kcal, 77 g protein, and 912 mL free water. +60 mL free water flush q 4 hrs. Anthropometric Measures:  Height: 5' 8\" (172.7 cm)  Ideal Body Weight (IBW): 154 lbs (70 kg)    Admission Body Weight: 203 lb (92.1 kg)  Current Body Weight: 201 lb (91.2 kg), 132 % IBW. Weight Source: Bed Scale  Current BMI (kg/m2): 30.6        Weight Adjustment For: No Adjustment                 BMI Categories: Obese Class 1 (BMI 30.0-34. 9)    Estimated Daily Nutrient Needs:  Energy Requirements Based On: Kcal/kg (25-30)  Weight Used for Energy Requirements: Ideal  Energy (kcal/day): 7141-1489 kcal  Weight Used for Protein Requirements: Ideal (1.0-1.2 g/kg)  Protein (g/day): 70-84 g  Method Used for Fluid Requirements: 1 ml/kcal  Fluid (ml/day): 6986-3752 mL    Nutrition Diagnosis:   Inadequate energy intake related to inadequate protein-energy intake, swallowing difficulty as evidenced by swallow study results    Nutrition Interventions:   Food and/or Nutrient Delivery: Start Oral Diet, Start Tube Feeding  Nutrition Education/Counseling: No recommendation at this time  Coordination of Nutrition Care: Continue to monitor while inpatient, Interdisciplinary Rounds       Goals:     Goals: Meet at least 75% of estimated needs, by next RD assessment       Nutrition Monitoring and Evaluation:   Behavioral-Environmental Outcomes: None Identified  Food/Nutrient Intake Outcomes: Diet Advancement/Tolerance, Enteral Nutrition Intake/Tolerance  Physical Signs/Symptoms Outcomes: Biochemical Data, Chewing or Swallowing, Nutrition Focused Physical Findings, Weight    Discharge Planning:     Too soon to determine     Stephany Corbin Feliciano [address], RD, LD  Contact: [phone]

## 2022-10-21 NOTE — PROGRESS NOTES
Speech Language Pathology  Clinical Bedside Swallow Assessment  Facility/Department: [facility] C2 CARD TELEMETRY    Recommendations:  Diet recommendation: NPO; Ice chips with SLP/RN only; Meds via alt means of nutrition  Instrumentation: Not clinically indicated at this time.  Will cont to monitor   Risk management: Control risk factors for aspiration PNA by completing oral care 3-4x/day and increasing physical mobility as is medically feasible      NAME:Emilio Palacios  : 1945 (71 y.o.)   MRN: [id number]  ROOM: [address] [place] [address]  ADMISSION DATE: 10/15/2022  PATIENT DIAGNOSIS(ES): Bradycardia [R00.1]  Symptomatic bradycardia [R00.1]  Chief Complaint   Patient presents with    Cardiac Arrest     Air care from [facility]     Patient Active Problem List    Diagnosis Date Noted    Proteus mirabilis infection 10/18/2022    Staphylococcal pneumonia ([address] 10/18/2022    Pyuria 10/17/2022    Aspiration into airway 10/17/2022    Complete heart block ([address] 10/16/2022    Hypotension     Systolic heart failure ([address] 10/16/2022    LBBB (left bundle branch block) 10/16/2022    Non-ischemic cardiomyopathy ([address] 10/16/2022    Symptomatic bradycardia 10/16/2022    Hyperkalemia 10/16/2022    AUSTIN (acute kidney injury) ([address] 10/16/2022    Hypoglycemia 10/16/2022    Hypothermia 10/16/2022    Hyponatremia 10/16/2022    Pulmonary infiltrates 10/16/2022    Shock circulatory ([address] 10/16/2022    Normocytic hypochromic anemia 10/16/2022    Observed seizure-like activity ([address] 10/16/2022    Acute respiratory failure with hypoxia ([address] 10/16/2022    Dementia ([address] 10/15/2022    Hypercholesteremia 10/12/2022    New onset seizure ([address] 10/12/2022    Other specified hypothyroidism 10/12/2022    Paroxysmal atrial fibrillation ([address] 10/12/2022    Benign prostatic hyperplasia, presence of lower urinary tract symptoms unspecified 10/12/2022    Schizoaffective disorder ([facility] 10/11/2022    Long term current use of amiodarone 2014 Atrial flutter (HCC)     Cardiomyopathy ([address]     Schizophrenia ([address]     CKD (chronic kidney disease)     CHF (congestive heart failure) (HCC)     PVC's (premature ventricular contractions)      Past Medical History:   Diagnosis Date    Acute kidney injury ([address]     Anemia     Hx of     Arthritis     Atrial fibrillation ([address]     Atrial flutter ([address]     converted to NSR, Poor candidate for anticoagulation. CAD (coronary artery disease)     Cardiomyopathy ([address]     ? Nonishcemic, Echo 10/2014 LVEF 25-30%. Cellulitis of right upper extremity     CHF (congestive heart failure) (HCC)     systolic    CKD (chronic kidney disease)     Cr 2.3 10/2014, not on ACE/ARB due to CKD    Hypertension     Hypovolemia     PVC's (premature ventricular contractions)     secondary to hypokalemia    Schizophrenia ([address]     hx of    Seizures ([facility]     Urinary tract infection      Past Surgical History:   Procedure Laterality Date    APPENDECTOMY      BRONCHOSCOPY N/A 10/17/2022    BRONCHOSCOPY ALVEOLAR LAVAGE performed by Lizzy Lerma MD at [place] [address]  10/17/2022    BRONCHOSCOPY THERAPUTIC ASPIRATION INITIAL performed by Lizzy Lerma MD at [address] [place] [address] 10/19/2022    BRONCHOSCOPY DIAGNOSTIC OR CELL [address] ONLY performed by Lizzy Lerma MD at [address] [place] [address]       Allergies   Allergen Reactions    Penicillins        DATE ONSET: 10/15/2022    Date of Evaluation: 10/21/2022   Evaluating Therapist: NILAY Jacobs    Chart Reviewed: : [x] Yes [] No    Current Diet: ADULT TUBE FEEDING; Nasogastric; Renal Formula; Continuous; 10; Yes; 10; Q 4 hours; 35; 30; Q 4 hours; Protein; 1 bottle of proteinex daily via syringe, 30 mL before and after administration    Recent Chest Radiography: [x] Chest XR   [] CT of Chest  Date: 10/16/2022  Impressions  Impression   Supportive tubing projects in normal position. Low lung volumes.        Right basilar opacity, indeterminate for atelectasis or pneumonia/aspiration   pneumonitis. Pain: The patient does not complain of pain     Reason for Referral  Celina Salgado was referred for a bedside swallow evaluation to assess the efficiency of their swallow function, identify signs and symptoms of aspiration and make recommendations regarding safe dietary consistencies, effective compensatory strategies, and safe eating environment. Assessment    Medical record review/interview: Per MD H&P: \"Emilio Lewis is a 78 y.o. male. Patient has a h/o schizoaffective disorder. He lives in [place], [place]. He receives most of his medical care through the [place] in [place]. [facility] [address] [facility] [place], [place] for hallucinations and impulsive behavior. He was there for ten days then transferred to the [facility] geriatric inpatient psychiatry unit on 10/11. Around 9pm on 10/15 a code blue was called because the patient briefly went unresponsive. He had been bradycardic all day  He had been taking metoprolol chronically but this had been held throughout the day because of his bradycardia. His nurse was trying to check his vitals again as it appeared he was breathing abnormally. While vital signs were being checked the patient lost consciousness briefly (less than a minute). He awoke but was markedly bradycardic. He was sent to the ER where he was found to be hypothermic, hypotensive, and bradycardic to the 20s in complete heart block. He was transferred to [facility] for urgent transvenous pacing lead placement. Patient had a h/o chronic systolic HF, thought to be nonischemic, and paroxysmal afib. The patient required sedation during temporary pacing lead placement and then became agitated thereafter. He required restraints and was given IM geodon, so was not meaningfully interactive or able to provide any history.   According to Dr. Herb Lacey notes the patient had already been difficult to understand at baseline. \"      Predisposing dysphagia risk factors: Age  Clinical signs of possible chronic dysphagia: recurrent PNA  Precipitating dysphagia risk factors: reduced physical mobility and increased O2 demands    Patient Complaints:  Odynophagia: [x] Yes [] No  Globus Sensation: [] Yes [x] No  SOB with PO intake: [x] Yes [] No  Increased WOB with PO intake: [] Yes [x] No  Reflux Sx's: [] Yes [x] No  Weight loss: [] Yes [x] No  Coughing/Choking with PO intake: [] Yes [x] No  Reduced Appetite: [] Yes [x] No    Additional Reported Symptoms/Complaints/Hospital Course:   Pt intubated on 10/16 and extubated 10/20. Pt reports throat feeling \"hurt and sore\"       Vitals/labs:   SpO2: 93  RR: 20   BP: 120/61  HR: 89   O2 device: NC     CBC:   Recent Labs     10/21/22  0415   WBC 7.4   HGB 9.4*   *      BMP:  Recent Labs     10/21/22  0415   *   K 4.0   CL 98*   CO2 26   BUN 29*   CREATININE 1.0   GLUCOSE 69*          Cranial nerve exam:   CN V (trigeminal): ophthalmic, maxillary, and mandibular facial sensation- WNL b/l  CN VII (facial): WNL b/l  CN IX/X (glossopharyngeal/vagus): MPT: DNT; pitch range: DNT; vocal quality: Adequate; cough: Strong-perceptually, Productive, and Dry  CN XII (hypoglossal):  WNL b/l    Laryngeal function exam:   Secretions: Oral mucosa pink and moist   Vocal quality: See CN exam above  MPT: See CN exam above  S/Z ratio: DNT   Pitch range: See CN exam above  Cough: See CN exam above    Oral Care Status:    [x] Oral Care [facility]  [] Poor oral care status  [x] Edentulous  [] Upper Dentures  [] Lower Dentures  [] Missing/Broken Teeth  [] Evidence of dental cavities/carries    PO trials:   Ice chips: WFL     IDDSI 0 (thin):   - 5cc bolus (tsp): no anterior bolus loss , suspect reduced/impaired A-P bolus transit, swallow timing subjectively appears timely, no coughing, and delayed throat clearing  - Cup and straw: anterior bolus loss, suspect reduced/impaired A-P bolus transit, suspect premature bolus loss into pharynx, coughing after the swallow, and immediate throat clearing    IDDSI 2 (mildly thick): PRAKASH    IDDSI 3 (moderately thick): PRAKASH    IDDSI 4 (puree): PRAKASH    IDDSI 5 (minced and moist): PRAKASH    IDDSI 6 (soft and bite sized): PRAKASH    IDDSI 7 (regular): PRAKASH    3 oz water: FAIL    Impressions:  Clinical signs of oropharyngeal dysphagia likely subacute related to fatigue, generalized weakness, intubation, and co-morbidities. Swallow prognosis is fair. Instrumental swallow study is not clinically indicated at this time, will cont to monitor. Given overt clinical s/s of aspiration at bedside, pt is not safe for oral diet at this time. Recommendations:  Diet recommendation: NPO; Ice chips with SLP/RN only; Meds via alt means of nutrition  Instrumentation: Not clinically indicated at this time.  Will cont to monitor   Risk management: Control risk factors for aspiration PNA by completing oral care 3-4x/day and increasing physical mobility as is medically feasible      Prognosis: Guarded - Fair    Recommended Intervention:   [x] Dysphagia tx  [] Videostroboscopy                      [x] NPO   [x] MBS       [] Speech/Cog Eval  [x] Therapeutic PO Trials     [x] Ice Chips   [] Other:  [] FEES                                                 Dysphagia Therapeutic Intervention:   []  Bolus control Exercises  []  Oral Motor Exercises  []  [employer] Protocol  []  Thermal Stimulation  [x]  Oral Care     []  Vital Stim/NMES  []  Laryngeal Exercises  [x]  Patient/Family Education  []  Pharyngeal Exercises  [x]  Therapeutic PO trials with SLP  [x]  Diet tolerance monitoring  []  Other:     Referrals:  [] ENT    [] PT  [] Pulmonology [] GI  [] Neurology  [] RD  [] OT   []     Goals:  Short Term Goals:  Timeframe for Short Term Goals: (5 days 10/26/2022)  Goal 1: The patient will tolerate recommended diet with no clinical s/s of aspiration 5/5  Goal 2: The patient will tolerate therapeutic diet upgrade trials with no clinical s/s of aspiration 5/5  Goal 3: The patient/caregiver will demonstrate understanding of compensatory swallow strategies, for improved swallow safety  Goal 4: The patient will tolerate repeat BSE when able for ongoing assessment  Goal 5: The patient will tolerate instrumental assessment when able , if appropriate. Long Term Goals:   Timeframe for Long Term Goals: (7 days 10/28/2022)  Goal 1: The patient will tolerate least restrictive diet with no clinical s/s of aspiration or worsening respiratory/pulmonary status    Treatment:  Skilled instruction completed with patient re: evidenced based practice regarding recommendations and POC, importance of oral care to reduce adverse affects in the event of aspiration, and instruction of recommended compensatory strategies developed based upon clinical exam. Pt able to recall/demonstrate compensatory strategies with min cues. Pt Education: SLP educated the patient re: Role of SLP, rationale for completion of assessment, anatomical components of swallow structures as they pertain to airway protection, results of assessment, recommendations, and POC  Pt Education Response: verbalized understanding, demonstrated understanding, and RN aware    Duration/Frequency of Tx: 3-5x/wk     Individuals Consulted:   [x]  Patient     []  NP         [x]  RN   []  RD                   []  MD      []  Family Member                        []  PA    []  Other:      Safety Devices / Report:  [x]  All fall risk precautions in place [x]  Safety handoff completed with RN  [x]  Bed alarm in place  [x]  Left in bed     []  Chair alarm in place  []  Left in chair   [x]  Call light in reach   []  Other:                        Total Treatment Time / Charges       Time in Time out Total Time / units   Swallow Eval/Tx Time 1015  1040 25 mins/ 2 units      Signature:  Graciela DENIS-SLP  Clinical Fellow  [id number]

## 2022-10-21 NOTE — PROGRESS NOTES
Pt Glucose 69 on morning labwork, 61 on glucometer. IV Dextrose bolus given. Repeat BG 77. Will monitor.

## 2022-10-21 NOTE — PROGRESS NOTES
Occupational Therapy  Facility/Department: [facility] C2 CARD TELEMETRY  Occupational Therapy Initial Assessment and Treatment    OT/PT co-tx to ensure pt safety with transfers, mobility, and ADLs    Name: Mercedes Love  : 1945  MRN: [id number]  Date of Service: 10/21/2022    Discharge Recommendations:  [address]          Patient Diagnosis(es): The encounter diagnosis was Bradycardia. Past Medical History:  has a past medical history of Acute kidney injury ([address] Anemia, Arthritis, Atrial fibrillation ([address] Atrial flutter ([address] CAD (coronary artery disease), Cardiomyopathy ([address] Cellulitis of right upper extremity, CHF (congestive heart failure) ([address] CKD (chronic kidney disease), Hypertension, Hypovolemia, PVC's (premature ventricular contractions), Schizophrenia ([address] Seizures ([address] and Urinary tract infection. Past Surgical History:  has a past surgical history that includes Appendectomy; TURP; bronchoscopy (N/A, 10/17/2022); bronchoscopy (10/17/2022); and bronchoscopy (N/A, 10/19/2022). Assessment   Performance deficits / Impairments: Decreased functional mobility ; Decreased balance;Decreased coordination;Decreased ADL status; Decreased posture;Decreased ROM; Decreased strength;Decreased endurance;Decreased fine motor control    Assessment: Pt is 77 yo male presenting with symptomatic bradychardia following code blue at [facility] facility. Pt is functioning well below baseline, requiring max Ax2 to complete bed mobility and 3 scoots toward HOB. He was able to tolerate 10 minuts of sitting upright at EOB, but required CGA for static sitting at EOB and min A for dynamic sit. Pt displayed significantly decreased AROM in BUE, but significantly in LUE d/t pain and swelling with little to no wrist flexion/extension, pronation/supination, and significantly decreased shoulder flexion. Pt would benefit from continued inpatient therapy following d/c.     Prognosis: Fair  Decision Making: High Complexity  REQUIRES OT FOLLOW-UP: Yes  Activity Tolerance  Activity Tolerance: Patient limited by pain; Patient limited by fatigue  Activity Tolerance Comments: Pt was pleasant throught session, but was limited to pain and weakness (swelling in LUE/LLE), and general fatigue. Plan   Occupational Therapy Plan  Times Per Week: 3-5  Current Treatment Recommendations: ROM, Balance training, Functional mobility training, Endurance training, Pain management, Safety education & training, Patient/Caregiver education & training, Equipment evaluation, education, & procurement, Positioning, Self-Care / ADL, Coordination training     Restrictions  Restrictions/Precautions  Restrictions/Precautions: Bed Alarm, ROM Restrictions, Fall Risk, Up as Tolerated  Required Braces or Orthoses?: No    Subjective   General  Chart Reviewed: Yes  Patient assessed for rehabilitation services?: Yes  Additional Pertinent Hx: atrial fibrillation, atrial flutter, CAD, cardiomyopathy, CHF, CKD, HTN, schizophrenia. Extubated 10/20. Family / Caregiver Present: No  Referring Practitioner: Felix Hawkins MD  Diagnosis: Symptomatic bradychardia  Subjective  Subjective: Pt reclined supine in bed upon OT entry with RN in room. Pt agreeable to therapy and in a pleasant mood.   \"a lot of pain\" left ankle pain with palpation and movement, \"feels like needles\" in the left ankle  Social/Functional History  Social/Functional History  Lives With: Other (comment) (Brother)  Type of Home: Condo  Home Layout: One level  Home Access: Level entry  Bathroom Shower/Tub: Walk-in shower  Bathroom Toilet: Handicap height  Bathroom Equipment: Built-in shower seat, Grab bars in shower, Grab bars around toilet  Bathroom Accessibility: Accessible  Home Equipment: Walker, rolling, Oxygen  Has the patient had two or more falls in the past year or any fall with injury in the past year?: Unknown  Receives Help From: Family (From Brother)  ADL Assistance: Needs assistance (Dressing: socks and shoes)  Homemaking Responsibilities: No  Ambulation Assistance: Independent (Uses RW)  Active : Yes  Occupation: Retired,   Leisure & Hobbies: [employer])       Objective   Heart Rate: (!) 101  Heart Rate Source: Monitor  BP: (!) 108/58  BP Location: Right upper arm  BP Method: Automatic  Patient Position: Supine  MAP (Calculated): 74.67  Resp: 20  SpO2: 95 %  O2 Device: Nasal cannula  Comment: 4L O2          Observation/Palpation  Posture: Fair  Palpation: tender to palpation and warm to touch at left ankle  Safety Devices  Type of Devices: All jv prominences offloaded;Call light within reach; Chair alarm in place;Gait belt;Left in bed;Patient at risk for falls (Pt LUE supported and slightly elevated on pillows to decrease swelling.)  Restraints  Restraints Initially in Place: No  Bed Mobility Training  Bed Mobility Training: Yes  Overall Level of Assistance: Assist X2;Maximum assistance  Interventions: Manual cues;Demonstration; Safety awareness training; Tactile cues; Verbal cues; Visual cues; Weight shifting training/pressure relief  Rolling: Maximum assistance;Assist X2  Supine to Sit: Assist X2;Maximum assistance  Sit to Supine: Assist X2;Moderate assistance  Scooting: Assist X2;Moderate assistance  Balance  Sitting: Impaired  Sitting - Static:  (CGA)  Sitting - Dynamic: Supported sitting (Min-A x1)     AROM: Grossly decreased, non-functional (Pt BUE AROM limited d/t pain)  PROM: Grossly decreased, non-functional  Strength: Generally decreased, functional  Coordination: Generally decreased, functional  Sensation: Intact  ADL  Feeding:  (Awaiting speech consult)  Grooming: Moderate assistance  UE Bathing: Moderate assistance  LE Bathing: Maximum assistance  UE Dressing: Maximum assistance  UE Dressing Skilled Clinical Factors: Pt has extremenly limited ROM in BUE and would have difficulty donning a typical shirt.  His fine motor coordination is also severaly limited d/t swelling and pain. LE Dressing: Maximum assistance (Able to lift legs while supine to assist in donning socks. Pt states his brother helps him don/doff shoes and socks at baseline.)  Toileting: Dependent/Total (Peterson catheter)     Activity Tolerance  Activity Tolerance: Patient tolerated treatment well;Patient limited by pain  Activity Tolerance Comments: Unable to stand due to tenderness of right ankle and wrist        Vision - Basic Assessment  Prior Vision: Wears glasses all the time  Vision  Vision: Impaired  Vision Exceptions: Wears glasses for distance  Hearing  Hearing: Within functional limits  Cognition  Overall Cognitive Status: Exceptions  Arousal/Alertness: Delayed responses to stimuli  Following Commands: Follows one step commands with increased time  Attention Span: Difficulty dividing attention  Safety Judgement: Decreased awareness of need for assistance;Decreased awareness of need for safety  Problem Solving: Assistance required to correct errors made  Insights: Decreased awareness of deficits  Initiation: Requires cues for all  Sequencing: Requires cues for all  Orientation  Overall Orientation Status: Impaired  Orientation Level: Oriented to place;Oriented to person;Disoriented to time;Disoriented to situation                  Education Given To: Patient  Education Provided: Role of Therapy;Plan of Care;Transfer Training;Orientation  Education Method: Verbal  Education Outcome: Verbalized understanding    Disease Specific Education:  Pt educated on importance of movement and sitting upright, CHF, and positioning. Pt verbalized understanding.      LUE AROM (degrees)  LUE AROM : Exceptions  LUE General AROM: LUE AROM severely limited 2/2 pain and swelling  L Shoulder Flexion 0-180: 60  L Elbow Extension 145-0: 45  L Wrist Flexion 0-80: 10  L Wrist Extension 0-70: 10  Left Hand AROM (degrees)  Left Hand AROM: Exceptions  Left Hand General AROM: could not touch fingertips together d/t pain and swelling  RUE AROM (degrees)  RUE AROM : Exceptions  R Shoulder Flexion 0-180: 80  R Elbow Extension 145-0: 60  Right Hand AROM (degrees)  Right Hand AROM: Exceptions  Right Hand General AROM: Roughly able to touch fingertips together, lack of dexterity d/t slight swelling                                                                   AM-PAC Score        AM-PAC Inpatient Daily Activity Raw Score: 10 (10/21/22 0927)  AM-PAC Inpatient ADL T-Scale Score : 27.31 (10/21/22 0927)  ADL Inpatient CMS 0-100% Score: 74.7 (10/21/22 0927)  ADL Inpatient CMS G-Code Modifier : CL (10/21/22 2813)         Goals  Short Term Goals  Time Frame for Short Term Goals: 1 week, 10/28/22  Short Term Goal 1: Pt will complete bed mobilty with mod Ax2 by 10/26  Short Term Goal 2: Pt will complete 10 reps BUE ex with SBA while seated EOB  Short Term Goal 3: Pt will require CGA to sit EOB and complete ADLs  Short Term Goal 4: Pt will complete sit<>stand transfer with mod A  Patient Goals   Patient goals : to not be in pain       Therapy Time   Individual Concurrent Group Co-treatment   Time In 0810         Time Out 0846         Minutes 36         Timed Code Treatment Minutes: 26 Minutes (10 min OT eval)     If pt is unable to be seen after this session, please let this note serve as discharge summary. Please see case management note for discharge disposition. Thank you.       Nayely Krishnamurthy, OTR/L

## 2022-10-21 NOTE — PROGRESS NOTES
Pt transferred to C4 room 430. Bedside report given to Maddie Golden RN. Pt placed on remote telemetry. Pt transferred via bed accompanied by staff and all belongings.

## 2022-10-21 NOTE — PROGRESS NOTES
Writer received call from barrington Cintron with 5 beats of vtach, vital signs stable, pt asymptomatic. FYI being sent to MD regarding. Kalpana Castellano RN  10/21/2022    See MD ordered metoprolol. [id number] writer sent message to cardiology as well to make aware. Awaiting return call. Kalpana Castellano RN  10/21/2022    1558 writer received call from Hafsa RUIZ, made aware, reviewed electrolytes, made aware regarding metoprolol order; no changes.   Kalpana Castellano RN  10/21/2022 x1

## 2022-10-21 NOTE — PROGRESS NOTES
Hospitalist Progress Note      PCP: [facility]    Date of Admission: 10/15/2022    Chief Complaint: respiratory failure s/p intubation. HPI   78 y.o. male. Patient has a h/o schizoaffective disorder. He lives in [place] [address]. He receives most of his medical care through the [place] in [place]. He was initially admitted to [facility]  in [place] [address] for hallucinations and impulsive behavior. He was there for ten days then transferred to the [facility] geriatric inpatient psychiatry unit on 10/11. Around 9pm on 10/15 a code blue was called because the patient briefly went unresponsive. He had been bradycardic all day  He had been taking metoprolol chronically but this had been held throughout the day because of his bradycardia. His nurse was trying to check his vitals again as it appeared he was breathing abnormally. While vital signs were being checked the patient lost consciousness briefly (less than a minute). He awoke but was markedly bradycardic. He was sent to the ER where he was found to be hypothermic, hypotensive, and bradycardic to the 20s in complete heart block. He was transferred to [place] for urgent transvenous pacing lead placement. Patient had a h/o chronic systolic HF, thought to be nonischemic, and paroxysmal afib. The patient required sedation during temporary pacing lead placement and then became agitated thereafter. He required restraints and was given IM geodon, so was not meaningfully interactive or able to provide any history. According to Dr. Lu Jeter notes the patient had already been difficult to understand at baseline. Subjective:     Patient is stable to transfer out of ICU. Patient has mild dyspnea. On 4 O2 via NC.      Medications:  Reviewed    Infusion Medications    dextrose       Scheduled Medications    OLANZapine zydis  20 mg Oral Nightly    lactulose  20 g Oral BID    docusate sodium  100 mg Oral Daily    cefTRIAXone (ROCEPHIN) IV  1,000 mg IntraVENous Q24H    mupirocin   Nasal BID    levothyroxine  125 mcg Oral Daily    atorvastatin  20 mg Oral Nightly    heparin (porcine)  5,000 Units SubCUTAneous 3 times per day    pantoprazole  40 mg IntraVENous Daily    aspirin  81 mg Oral Daily     PRN Meds: glucose, dextrose bolus **OR** dextrose bolus, glucagon (rDNA), dextrose, magnesium sulfate, potassium chloride **OR** potassium chloride, ondansetron, acetaminophen **OR** acetaminophen, ziprasidone, melatonin, perflutren lipid microspheres      Intake/Output Summary (Last 24 hours) at 10/21/2022 1326  Last data filed at 10/21/2022 0900  Gross per 24 hour   Intake 153 ml   Output 1390 ml   Net -1237 ml       Physical Exam Performed:    /68   Pulse 90   Temp 98.1 °F (36.7 °C) (Oral)   Resp 20   Ht 5' 8\" (1.727 m)   Wt 201 lb 11.5 oz (91.5 kg)   SpO2 96%   BMI 30.67 kg/m²     General appearance: Alert , in mild respiratory distress   HEENT: Pupils equal, round, and reactive to light. Conjunctivae/corneas clear. Neck: Supple, with full range of motion. No jugular venous distention. Trachea midline. Respiratory:  Normal respiratory effort. Clear to auscultation, bilaterally without Rales/Wheezes/Rhonchi. Cardiovascular: Regular rate and rhythm with normal S1/S2 without murmurs, rubs or gallops. Abdomen: Soft, non-tender, non-distended with normal bowel sounds. Musculoskeletal: No clubbing, cyanosis or edema bilaterally. Full range of motion without deformity. Skin: Skin color, texture, turgor normal.  No rashes or lesions. Neurologic:  Neurovascularly intact without any focal sensory/motor deficits.  Cranial nerves: II-XII intact, grossly non-focal.  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 XR CHEST PORTABLE   Final Result   Supportive tubing projects in normal position. Low lung volumes. Right basilar opacity, indeterminate for atelectasis or pneumonia/aspiration   pneumonitis. XR CHEST PORTABLE   Final Result   Endotracheal tube tip is 3 cm above the ade. Enteric tube tip is within   the stomach with side port just below the gastroesophageal junction. XR ABDOMEN (KUB) (SINGLE AP VIEW)   Final Result   Endotracheal tube tip is 3 cm above the ade. Enteric tube tip is within   the stomach with side port just below the gastroesophageal junction. XR ABDOMEN (KUB) (SINGLE AP VIEW)    (Results Pending)           Assessment/Plan:    Active Hospital Problems    Diagnosis     Proteus mirabilis infection [A49.8]      Priority: Medium    Staphylococcal pneumonia ([address] [J15.20]      Priority: Medium    Pyuria [R82.81]      Priority: Medium    Aspiration into airway [T17.908A]      Priority: Medium    Complete heart block ([address] [I44.2]      Priority: Medium    Hypotension [I95.9]      Priority: Medium    Systolic heart failure ([address] [I50.20]      Priority: Medium    Symptomatic bradycardia [R00.1]      Priority: Medium    Hyperkalemia [E87.5]      Priority: Medium    AUSTIN (acute kidney injury) ([address] [N17.9]      Priority: Medium    Hypoglycemia [E16.2]      Priority: Medium    Hypothermia [T68. XXXA]      Priority: Medium    Hyponatremia [E87.1]      Priority: Medium    Pulmonary infiltrates [R91.8]      Priority: Medium    Shock circulatory ([address] [R57.9]      Priority: Medium    Normocytic hypochromic anemia [D50.9]      Priority: Medium    Observed seizure-like activity (HCC) [R56.9]      Priority: Medium    Acute respiratory failure with hypoxia (HCC) [J96.01]      Priority: Medium    Paroxysmal atrial fibrillation (HCC) [I48.0]      Priority: Medium    Schizoaffective disorder ([address] [F25.9]      Priority: Medium    Cardiomyopathy ([facility] [I42.9]      Acute hypoxemic respiratory failure   required mechanical ventilator   S/p extubation on 10/20/22  Management as per ICU team    Pneumonia  IV ceftriaxone    Bradycardia  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 Cardiology on board. Abnormal LFT  improving    AUSTIN  Monitor GFR, avoid nephrotoxic agent     Hypothermia  resolved     Acute on chronic Systolic HF  New EF 88%  C/w current meds      Schizoaffective D/O  -  Continue home olanzapine 20mg nightly, trazodone 50mg nightly.          DVT Prophylaxis: heparin   Diet: ADULT TUBE FEEDING; Nasogastric; Renal Formula; Continuous; 10; Yes; 10; Q 4 hours; 35; 30; Q 4 hours; Protein; 1 bottle of proteinex daily via syringe, 30 mL before and after administration  Code Status: Full Code  PT/OT Eval Status: yes    Dispo - Critically ill        Brooks MD Margot

## 2022-10-21 NOTE — PROGRESS NOTES
INPATIENT PULMONARY CRITICAL CARE PROGRESS NOTE      Reason for visit    hypothermia, hypotensive and bradycardic status post urgent temporary transvenous pacemaker placement    SUBJECTIVE: Patient's IV sedation was tapered to DC and patient was given a trial of CPAP with pressure support and patient was tolerating it well, patient was generating good tidal volumes and had maintain acid-base balance and patient was communicative on the vent and for that reason patient was extubated to nasal cannula oxygen, patient was tolerating extubation well this morning, patient did not have any increasing shortness of breath, patient was still having some oral secretions which he is suctioning on his own with the help of a catheter, patient had a T-max of 99.9 °F yesterday but was afebrile this morning, patient has sinus rhythm on the monitor with  Pulm no signs or symptoms as tachycardia, patient was on 4 L of nasal cannula oxygen with saturation of 93%, patient feels at bedside nursing swallowing assessment and 3 evaluation has been requested, patient has good urine output overnight with cumulative fluid balance of -980 mL, patient had slightly decreased blood sugar this morning which was corrected with improvement; no other pertinent review of system of concern         Physical Exam:  Blood pressure (!) 108/58, pulse (!) 101, temperature 99 °F (37.2 °C), temperature source Bladder, resp. rate 19, height 5' 8\" (1.727 m), weight 201 lb 11.5 oz (91.5 kg), SpO2 95 %.'     Constitutional:  No acute distress. On mechanical vent support  HENT: Endotracheal tube present no thyromegaly. Eyes:  Conjunctivae are normal. Pupils equal, round, and reactive to light. No scleral icterus. Neck: . No tracheal deviation present. No obvious thyroid mass. Cardiovascular: Sinus rhythm normal heart sounds. No right ventricular heave. Bibasilar extremity edema. Pulmonary/Chest: No wheezes. decreased  rales.   Chest wall is not dull to percussion. No accessory muscle usage or stridor. Abdominal: Soft. Bowel sounds present. No distinct tenderness now  Musculoskeletal: No cyanosis. No clubbing. No obvious joint deformity. Lymphadenopathy: No cervical or supraclavicular adenopathy. Skin: Skin is warm and dry. Skin rash lower extremities  Neurologic alert and communicative when seen    Results:  CBC:   Recent Labs     10/19/22  0403 10/20/22  0421 10/21/22  0415   WBC 6.4 7.1 7.4   HGB 10.0* 9.5* 9.4*   HCT 30.9* 28.2* 27.9*   MCV 89.8 88.8 88.5   * 109* 112*       BMP:   Recent Labs     10/19/22  0403 10/20/22  0421 10/21/22  0415   * 132* 135*   K 4.7 4.2 4.0   CL 93* 96* 98*   CO2 22 26 26   PHOS 4.8 4.2 3.7   BUN 30* 35* 29*   CREATININE 1.1 1.1 1.0       LIVER PROFILE:   Recent Labs     10/19/22  0403 10/20/22  0421 10/21/22  0415   AST 96* 48* 38*   ALT 82* 56* 41*   BILITOT 0.5 0.5 0.5   ALKPHOS 157* 143* 130*       PT/INR:   Recent Labs     10/19/22  0403 10/20/22  0421 10/21/22  0415   PROTIME 14.9* 14.2 14.3   INR 1.18* 1.11 1.12           Imaging:  I have reviewed radiology images personally. CT ABDOMEN PELVIS WO CONTRAST Additional Contrast? None   Final Result   Elevation of the right hemidiaphragm with right middle lobe and right lower   lobe consolidation/atelectasis. This could represent pneumonia. Small right   pleural effusion. Small left pleural effusion with some compressive   atelectasis of the left lower lobe. Moderate cardiomegaly with coronary artery calcifications. Calcified gallstones in the gallbladder. The gallbladder is contracted with   apparent mural calcifications, in keeping with a porcelain gallbladder. This   is associated with increased incidence of gallbladder carcinoma. Surgical   consultation recommended. Moderate amount of fecal material in the left colon, with mild rectal fecal   impaction. Findings are in keeping with constipation.       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 XR CHEST PORTABLE   Final Result   Supportive tubing projects in normal position. Low lung volumes. Right basilar opacity, indeterminate for atelectasis or pneumonia/aspiration   pneumonitis. XR CHEST PORTABLE   Final Result   Endotracheal tube tip is 3 cm above the ade. Enteric tube tip is within   the stomach with side port just below the gastroesophageal junction. XR ABDOMEN (KUB) (SINGLE AP VIEW)   Final Result   Endotracheal tube tip is 3 cm above the ade. Enteric tube tip is within   the stomach with side port just below the gastroesophageal junction.              CULTURE, RESPIRATORY >100,000 CFU/mL Normal respiratory emmanuel Abnormal   [facility]   Gram Stain Result 4+ WBC's (Polymorphonuclear)   1+ WBC's (Mononuclear)   2+ Gram positive cocci   2+ Gram positive rods  [facility]   Organism Staphylococcus aureus Abnormal   [facility]   CULTURE, RESPIRATORY >100,000 CFU/mL  [address] [facility]        Susceptibility    Staphylococcus aureus (1)    Antibiotic Interpretation Microscan  Method Status    ceFAZolin Sensitive <=4 mcg/mL BACTERIAL SUSCEPTIBILITY PANEL BY MAGALIE     clindamycin Sensitive <=0.5 mcg/mL BACTERIAL SUSCEPTIBILITY PANEL BY MAGALIE     erythromycin Sensitive <=0.5 mcg/mL BACTERIAL SUSCEPTIBILITY PANEL BY MAGALIE     oxacillin Sensitive 0.5 mcg/mL BACTERIAL SUSCEPTIBILITY PANEL BY MAGALIE     tetracycline Sensitive <=4 mcg/mL BACTERIAL SUSCEPTIBILITY PANEL BY MAGALIE     trimethoprim-sulfamethoxazole Sensitive                     Proteus mirabilis (1)    Antibiotic Interpretation Microscan  Method Status    ampicillin Sensitive <=8 mcg/mL BACTERIAL SUSCEPTIBILITY PANEL BY MAGALIE     ampicillin-sulbactam Sensitive <=8/4 mcg/mL BACTERIAL SUSCEPTIBILITY PANEL BY MAGALIE     ceFAZolin Sensitive <=2 mcg/mL BACTERIAL SUSCEPTIBILITY PANEL BY MAGALIE     cefepime Sensitive <=2 mcg/mL BACTERIAL SUSCEPTIBILITY PANEL BY MAGALIE     cefTRIAXone Sensitive <=1 mcg/mL BACTERIAL SUSCEPTIBILITY PANEL BY MAGALIE     cefuroxime Sensitive <=4 mcg/mL BACTERIAL SUSCEPTIBILITY PANEL BY MAGALIE     ciprofloxacin Sensitive <=1 mcg/mL BACTERIAL SUSCEPTIBILITY PANEL BY MAGALIE     ertapenem Sensitive <=0.5 mcg/mL BACTERIAL SUSCEPTIBILITY PANEL BY MAGALIE     gentamicin Sensitive <=4 mcg/mL BACTERIAL SUSCEPTIBILITY PANEL BY MAGALIE     meropenem Sensitive <=1 mcg/mL BACTERIAL SUSCEPTIBILITY PANEL BY MAGALIE     nitrofurantoin Resistant >64 mcg/mL BACTERIAL SUSCEPTIBILITY PANEL BY MAGALIE     piperacillin-tazobactam Sensitive <=16 mcg/mL BACTERIAL SUSCEPTIBILITY PANEL BY MAGALIE     trimethoprim-sulfamethoxazole Sensitive            Assessment:  Principal Problem:    Symptomatic bradycardia  Active Problems:    Schizoaffective disorder (HCC)    Paroxysmal atrial fibrillation (HCC)    Complete heart block (HCC)    Hypotension    Systolic heart failure (HCC)    Hyperkalemia    AUSTIN (acute kidney injury) (HCC)    Hypoglycemia    Hypothermia    Hyponatremia    Pulmonary infiltrates    Shock circulatory (HCC)    Normocytic hypochromic anemia    Observed seizure-like activity (HCC)    Acute respiratory failure with hypoxia (HCC)    Pyuria    Aspiration into airway    Proteus mirabilis infection    Staphylococcal pneumonia ([address]    Cardiomyopathy ([address]  Resolved Problems:    * No resolved hospital problems.  *          Plan:   Patient to keep saturation between 90 and 94% only  Please titrate the oxygen as per the above parameters  Pulmonary toilet  Status post post therapeutic bronchoscopy  The Rocephin to continue for 3 more days  Status post temporary transvenous pacemaker  Monitor cardiac rhythm and hemodynamics closely  Speech evaluation requested  Cardiac medications and Synthroid to continue  Monitor I/O  and BMP  Correct electrolytes on whenever necessary basis  Monitor for any worsening hypoglycemia  PUD and DVT prophylaxis  PT/OT evaluations        Case discussed with ICU team    Patient can be transferred out of the ICU to a stepdown unit from pulmonary/critical care standpoint        Electronically signed by:  [facility] FIRST COLONY, MD    10/21/2022    10:40 AM.

## 2022-10-21 NOTE — PROGRESS NOTES
Writer sent message to Dr. Raimundo Pierre regarding speech and dietician noted/recommendations today as well as regarding CT from 10/19 and per previous nurse pt is still without bowel movement. Awaiting response or new orders. Luciano Mistry RN  10/21/2022    Response from MD to resume dietician recs. Writer will place order. See new orders for lactulose and colace.   Luciano Mistry RN  10/21/2022

## 2022-10-21 NOTE — PROGRESS NOTES
Writer was told NGT at 61, appears to be at 64, and is bridled, KUB ordered to confirm placement. Randall Angela RN  10/21/2022    KUB was completed. Per Dr. Jennifer kapoor to use NGT.   Randall Angela RN  10/21/2022

## 2022-10-21 NOTE — CARE COORDINATION
Patient new to writer and just transferred to C4 from ICU today at 12:30 PM per nursing notes. Recently extubated. From [facility] per notes. SNF verses LTACH is currently plan. LTACH screening in process. They are following for behavioral issues post intubation. Potential barrier to [facility] or SNF placement is hx of schizoaffective disorder and behaviors. Will await LTACH determination. Will need [employer] pre-cert.

## 2022-10-21 NOTE — PROGRESS NOTES
Blood sugar currently 90. Prn dextrose 10% had been given for blood sugar of 70. Tube feeding being started at this time. Jevity 1.2 was ordered if out of jevity 1.5.  tube feed started at initial rate, will have increased per order. Water flushes programmed per order for 60mL every 4 hours.   Diomedes Monae RN  10/21/2022

## 2022-10-21 NOTE — PROGRESS NOTES
4 Eyes Skin Assessment     The patient is being assess for   Transfer to New Unit    I agree that 2 RN's have performed a thorough Head to Toe Skin Assessment on the patient. ALL assessment sites listed below have been assessed. Areas assessed for pressure by both nurses:   []   Head, Face, and Ears   []   Shoulders, Back, and Chest, Abdomen  []   Arms, Elbows, and Hands   []   Coccyx, Sacrum, and Ischium  []   Legs, Feet, and Heels        Skin Assessed Under all Medical Devices by both nurses:  glynn              All Mepilex Borders were peeled back and area peeked at by both nurses:  Yes  Please list where Mepilex Borders are located:  coccyx             **SHARE this note so that the co-signing nurse is able to place an eSignature**    Co-signer eSignature: Electronically signed by Peng Olsen RN on 10/21/22 at 9:39 PM EDT    Does the Patient have Skin Breakdown related to pressure?   No     (Insert Photo here)         Alvin Prevention initiated:  Yes   Wound Care Orders initiated:  NA      [facility] nurse consulted for Pressure Injury (Stage 3,4, Unstageable, DTI, NWPT, Complex wounds)and New or Established Ostomies:  NA      Primary Nurse eSignature: Electronically signed by Chintan Gardner RN on 10/21/22 at 7:37 PM EDT

## 2022-10-21 NOTE — PLAN OF CARE
SLP completed evaluation. Please refer to notes in EMR.     Dicky Landau CFY-SLP  Clinical Fellow  [id number]

## 2022-10-22 LAB
A/G RATIO: 1 (ref 1.1–2.2)
ALBUMIN SERPL-MCNC: 3 G/DL (ref 3.4–5)
ALP BLD-CCNC: 131 U/L (ref 40–129)
ALT SERPL-CCNC: 45 U/L (ref 10–40)
ANION GAP SERPL CALCULATED.3IONS-SCNC: 11 MMOL/L (ref 3–16)
AST SERPL-CCNC: 55 U/L (ref 15–37)
BASOPHILS ABSOLUTE: 0 K/UL (ref 0–0.2)
BASOPHILS RELATIVE PERCENT: 0.2 %
BILIRUB SERPL-MCNC: 0.4 MG/DL (ref 0–1)
BUN BLDV-MCNC: 29 MG/DL (ref 7–20)
CALCIUM SERPL-MCNC: 9.1 MG/DL (ref 8.3–10.6)
CHLORIDE BLD-SCNC: 104 MMOL/L (ref 99–110)
CO2: 28 MMOL/L (ref 21–32)
CREAT SERPL-MCNC: 0.9 MG/DL (ref 0.8–1.3)
EOSINOPHILS ABSOLUTE: 0.1 K/UL (ref 0–0.6)
EOSINOPHILS RELATIVE PERCENT: 1.2 %
GFR SERPL CREATININE-BSD FRML MDRD: >60 ML/MIN/{1.73_M2}
GLUCOSE BLD-MCNC: 118 MG/DL (ref 70–99)
GLUCOSE BLD-MCNC: 123 MG/DL (ref 70–99)
GLUCOSE BLD-MCNC: 130 MG/DL (ref 70–99)
GLUCOSE BLD-MCNC: 132 MG/DL (ref 70–99)
GLUCOSE BLD-MCNC: 158 MG/DL (ref 70–99)
HCT VFR BLD CALC: 28.6 % (ref 40.5–52.5)
HEMOGLOBIN: 9.4 G/DL (ref 13.5–17.5)
INR BLD: 1.18 (ref 0.87–1.14)
LYMPHOCYTES ABSOLUTE: 0.4 K/UL (ref 1–5.1)
LYMPHOCYTES RELATIVE PERCENT: 5.1 %
MAGNESIUM: 2 MG/DL (ref 1.8–2.4)
MCH RBC QN AUTO: 29.1 PG (ref 26–34)
MCHC RBC AUTO-ENTMCNC: 32.8 G/DL (ref 31–36)
MCV RBC AUTO: 88.8 FL (ref 80–100)
MONOCYTES ABSOLUTE: 1.5 K/UL (ref 0–1.3)
MONOCYTES RELATIVE PERCENT: 17.6 %
NEUTROPHILS ABSOLUTE: 6.5 K/UL (ref 1.7–7.7)
NEUTROPHILS RELATIVE PERCENT: 75.9 %
PDW BLD-RTO: 16.2 % (ref 12.4–15.4)
PERFORMED ON: ABNORMAL
PHOSPHORUS: 3.9 MG/DL (ref 2.5–4.9)
PLATELET # BLD: 142 K/UL (ref 135–450)
PMV BLD AUTO: 8.2 FL (ref 5–10.5)
POTASSIUM REFLEX MAGNESIUM: 3.9 MMOL/L (ref 3.5–5.1)
POTASSIUM SERPL-SCNC: 3.9 MMOL/L (ref 3.5–5.1)
PROTHROMBIN TIME: 15 SEC (ref 11.7–14.5)
RBC # BLD: 3.22 M/UL (ref 4.2–5.9)
SODIUM BLD-SCNC: 143 MMOL/L (ref 136–145)
TOTAL PROTEIN: 5.9 G/DL (ref 6.4–8.2)
WBC # BLD: 8.6 K/UL (ref 4–11)

## 2022-10-22 PROCEDURE — 85025 COMPLETE CBC W/AUTO DIFF WBC: CPT

## 2022-10-22 PROCEDURE — 6370000000 HC RX 637 (ALT 250 FOR IP): Performed by: INTERNAL MEDICINE

## 2022-10-22 PROCEDURE — 80053 COMPREHEN METABOLIC PANEL: CPT

## 2022-10-22 PROCEDURE — 99232 SBSQ HOSP IP/OBS MODERATE 35: CPT | Performed by: INTERNAL MEDICINE

## 2022-10-22 PROCEDURE — 94761 N-INVAS EAR/PLS OXIMETRY MLT: CPT

## 2022-10-22 PROCEDURE — 83735 ASSAY OF MAGNESIUM: CPT

## 2022-10-22 PROCEDURE — 84100 ASSAY OF PHOSPHORUS: CPT

## 2022-10-22 PROCEDURE — C9113 INJ PANTOPRAZOLE SODIUM, VIA: HCPCS | Performed by: INTERNAL MEDICINE

## 2022-10-22 PROCEDURE — 36415 COLL VENOUS BLD VENIPUNCTURE: CPT

## 2022-10-22 PROCEDURE — 92526 ORAL FUNCTION THERAPY: CPT

## 2022-10-22 PROCEDURE — 2700000000 HC OXYGEN THERAPY PER DAY

## 2022-10-22 PROCEDURE — 85610 PROTHROMBIN TIME: CPT

## 2022-10-22 PROCEDURE — 2580000003 HC RX 258: Performed by: INTERNAL MEDICINE

## 2022-10-22 PROCEDURE — 6360000002 HC RX W HCPCS: Performed by: INTERNAL MEDICINE

## 2022-10-22 PROCEDURE — 2060000000 HC ICU INTERMEDIATE R&B

## 2022-10-22 RX ADMIN — LACTULOSE 20 G: 20 SOLUTION ORAL at 21:38

## 2022-10-22 RX ADMIN — HEPARIN SODIUM 5000 UNITS: 5000 INJECTION INTRAVENOUS; SUBCUTANEOUS at 14:16

## 2022-10-22 RX ADMIN — ATORVASTATIN CALCIUM 20 MG: 10 TABLET, FILM COATED ORAL at 21:38

## 2022-10-22 RX ADMIN — PANTOPRAZOLE SODIUM 40 MG: 40 INJECTION, POWDER, FOR SOLUTION INTRAVENOUS at 08:07

## 2022-10-22 RX ADMIN — ASPIRIN 81 MG 81 MG: 81 TABLET ORAL at 08:06

## 2022-10-22 RX ADMIN — METOPROLOL TARTRATE 12.5 MG: 25 TABLET, FILM COATED ORAL at 08:06

## 2022-10-22 RX ADMIN — HEPARIN SODIUM 5000 UNITS: 5000 INJECTION INTRAVENOUS; SUBCUTANEOUS at 05:39

## 2022-10-22 RX ADMIN — METOPROLOL TARTRATE 12.5 MG: 25 TABLET, FILM COATED ORAL at 21:38

## 2022-10-22 RX ADMIN — ACETAMINOPHEN 650 MG: 325 TABLET ORAL at 05:13

## 2022-10-22 RX ADMIN — HEPARIN SODIUM 5000 UNITS: 5000 INJECTION INTRAVENOUS; SUBCUTANEOUS at 21:45

## 2022-10-22 RX ADMIN — OLANZAPINE 20 MG: 5 TABLET, ORALLY DISINTEGRATING ORAL at 21:39

## 2022-10-22 RX ADMIN — LEVOTHYROXINE SODIUM 125 MCG: 0.12 TABLET ORAL at 05:13

## 2022-10-22 RX ADMIN — CEFTRIAXONE SODIUM 1000 MG: 1 INJECTION, POWDER, FOR SOLUTION INTRAMUSCULAR; INTRAVENOUS at 17:27

## 2022-10-22 ASSESSMENT — PAIN SCALES - GENERAL: PAINLEVEL_OUTOF10: 0

## 2022-10-22 NOTE — PLAN OF CARE
Problem: Chronic Conditions and Co-morbidities  Goal: Patient's chronic conditions and co-morbidity symptoms are monitored and maintained or improved  Outcome: Progressing     Problem: Discharge Planning  Goal: Discharge to home or other facility with appropriate resources  Outcome: Progressing     Problem: Pain  Goal: Verbalizes/displays adequate comfort level or baseline comfort level  Outcome: Progressing     Problem: Neurosensory - Adult  Goal: Achieves stable or improved neurological status  Outcome: Progressing  Goal: Absence of seizures  Outcome: Progressing  Goal: Remains free of injury related to seizures activity  Outcome: Progressing  Goal: Achieves maximal functionality and self care  Outcome: Progressing     Problem: Respiratory - Adult  Goal: Achieves optimal ventilation and oxygenation  Outcome: Progressing     Problem: Cardiovascular - Adult  Goal: Maintains optimal cardiac output and hemodynamic stability  Outcome: Progressing  Goal: Absence of cardiac dysrhythmias or at baseline  Outcome: Progressing     Problem: Skin/Tissue Integrity - Adult  Goal: Skin integrity remains intact  Outcome: Progressing  Goal: Incisions, wounds, or drain sites healing without S/S of infection  Outcome: Progressing  Goal: Oral mucous membranes remain intact  Outcome: Progressing     Problem: Musculoskeletal - Adult  Goal: Return mobility to safest level of function  Outcome: Progressing  Goal: Maintain proper alignment of affected body part  Outcome: Progressing  Goal: Return ADL status to a safe level of function  Outcome: Progressing     Problem: Genitourinary - Adult  Goal: Absence of urinary retention  Outcome: Progressing  Goal: Urinary catheter remains patent  Outcome: Progressing     Problem: Hematologic - Adult  Goal: Maintains hematologic stability  Outcome: Progressing     Problem: Nutrition Deficit:  Goal: Optimize nutritional status  Outcome: Progressing  Flowsheets (Taken 10/21/2022 1018 by Tressa Borges MS, RD, LD)  Nutrient intake appropriate for improving, restoring, or maintaining nutritional needs:   Assess nutritional status and recommend course of action   Monitor oral intake, labs, and treatment plans   Recommend appropriate diets, oral nutritional supplements, and vitamin/mineral supplements   Recommend, monitor, and adjust tube feedings and TPN/PPN based on assessed needs     Problem: Skin/Tissue Integrity  Goal: Absence of new skin breakdown  Description: 1. Monitor for areas of redness and/or skin breakdown  2. Assess vascular access sites hourly  3. Every 4-6 hours minimum:  Change oxygen saturation probe site  4. Every 4-6 hours:  If on nasal continuous positive airway pressure, respiratory therapy assess nares and determine need for appliance change or resting period.   Outcome: Progressing     Problem: Safety - Adult  Goal: Free from fall injury  Outcome: Progressing

## 2022-10-22 NOTE — PLAN OF CARE
Problem: Chronic Conditions and Co-morbidities  Goal: Patient's chronic conditions and co-morbidity symptoms are monitored and maintained or improved  10/21/2022 2103 by Ashly Moreno RN  Outcome: Progressing  10/21/2022 2102 by Ashly Moreno RN  Outcome: Progressing     Problem: Discharge Planning  Goal: Discharge to home or other facility with appropriate resources  10/21/2022 2103 by Ashly Moreno RN  Outcome: Progressing  10/21/2022 2102 by Ashly Moreno RN  Outcome: Progressing     Problem: Pain  Goal: Verbalizes/displays adequate comfort level or baseline comfort level  10/21/2022 2103 by Ashly Moreno RN  Outcome: Progressing  10/21/2022 2102 by Ashly Moreno RN  Outcome: Progressing     Problem: Neurosensory - Adult  Goal: Achieves stable or improved neurological status  10/21/2022 2103 by Ashly Moreno RN  Outcome: Progressing  10/21/2022 2102 by Ashly Moreno RN  Outcome: Progressing  Goal: Absence of seizures  10/21/2022 2103 by Ashly Moreno RN  Outcome: Progressing  10/21/2022 2102 by Ashly Moreno RN  Outcome: Progressing  Goal: Remains free of injury related to seizures activity  10/21/2022 2103 by Ashly Moreno RN  Outcome: Progressing  10/21/2022 2102 by Ashly Moreno RN  Outcome: Progressing  Goal: Achieves maximal functionality and self care  10/21/2022 2103 by Ashly Moreno RN  Outcome: Progressing  10/21/2022 2102 by Ashly Moreno RN  Outcome: Progressing     Problem: Respiratory - Adult  Goal: Achieves optimal ventilation and oxygenation  10/21/2022 2103 by Ashly Moreno RN  Outcome: Progressing  10/21/2022 2102 by Ashly Moreno RN  Outcome: Progressing     Problem: Cardiovascular - Adult  Goal: Maintains optimal cardiac output and hemodynamic stability  10/21/2022 2103 by Ashly Moreno RN  Outcome: Progressing  10/21/2022 2102 by Ashly Moreno RN  Outcome: Progressing  Goal: Absence of cardiac dysrhythmias or at baseline  10/21/2022 2103 by Lin Lawrence RN  Outcome: Progressing  10/21/2022 2102 by Lin Lawrence RN  Outcome: Progressing     Problem: Skin/Tissue Integrity - Adult  Goal: Skin integrity remains intact  10/21/2022 2103 by Lin Lawrence RN  Outcome: Progressing  10/21/2022 2102 by Lin Lawrence RN  Outcome: Progressing  Goal: Incisions, wounds, or drain sites healing without S/S of infection  10/21/2022 2103 by Lin Lawrence RN  Outcome: Progressing  10/21/2022 2102 by Lin Lawrence RN  Outcome: Progressing  Goal: Oral mucous membranes remain intact  10/21/2022 2103 by Lin Lawrence RN  Outcome: Progressing  10/21/2022 2102 by Lin Lawrence RN  Outcome: Progressing     Problem: Musculoskeletal - Adult  Goal: Return mobility to safest level of function  10/21/2022 2103 by Lin Lawrence RN  Outcome: Progressing  10/21/2022 2102 by Lin Lawrence RN  Outcome: Progressing  Goal: Maintain proper alignment of affected body part  10/21/2022 2103 by Lin Lawrence RN  Outcome: Progressing  10/21/2022 2102 by Lin Lawrence RN  Outcome: Progressing  Goal: Return ADL status to a safe level of function  10/21/2022 2103 by Lin Lawrence RN  Outcome: Progressing  10/21/2022 2102 by Lin Lawrence RN  Outcome: Progressing     Problem: Genitourinary - Adult  Goal: Absence of urinary retention  10/21/2022 2103 by Lin Lawrence RN  Outcome: Progressing  10/21/2022 2102 by Lin Lawrence RN  Outcome: Progressing  Goal: Urinary catheter remains patent  10/21/2022 2103 by Lin Lawrence RN  Outcome: Progressing  10/21/2022 2102 by Lin Lawrence RN  Outcome: Progressing     Problem: Hematologic - Adult  Goal: Maintains hematologic stability  10/21/2022 2103 by Lin Lawrence RN  Outcome: Progressing  10/21/2022 2102 by Lin Lawrence RN  Outcome: Progressing     Problem: Nutrition Deficit:  Goal: Optimize nutritional status  10/21/2022 2103 by Halie Peng RN  Outcome: Progressing  10/21/2022 2102 by Halie Peng RN  Outcome: Progressing  Flowsheets (Taken 10/21/2022 1018 by Hair Terrell, MS, RD, LD)  Nutrient intake appropriate for improving, restoring, or maintaining nutritional needs:   Assess nutritional status and recommend course of action   Monitor oral intake, labs, and treatment plans   Recommend appropriate diets, oral nutritional supplements, and vitamin/mineral supplements   Recommend, monitor, and adjust tube feedings and TPN/PPN based on assessed needs     Problem: Skin/Tissue Integrity  Goal: Absence of new skin breakdown  Description: 1. Monitor for areas of redness and/or skin breakdown  2. Assess vascular access sites hourly  3. Every 4-6 hours minimum:  Change oxygen saturation probe site  4. Every 4-6 hours:  If on nasal continuous positive airway pressure, respiratory therapy assess nares and determine need for appliance change or resting period.   10/21/2022 2103 by Halie Peng RN  Outcome: Progressing  10/21/2022 2102 by Halie Peng RN  Outcome: Progressing     Problem: Safety - Adult  Goal: Free from fall injury  10/21/2022 2103 by Halie Peng RN  Outcome: Progressing  10/21/2022 2102 by Halie Peng RN  Outcome: Progressing

## 2022-10-22 NOTE — PLAN OF CARE
Problem: Chronic Conditions and Co-morbidities  Goal: Patient's chronic conditions and co-morbidity symptoms are monitored and maintained or improved  Outcome: Progressing     Problem: Discharge Planning  Goal: Discharge to home or other facility with appropriate resources  Outcome: Progressing     Problem: Pain  Goal: Verbalizes/displays adequate comfort level or baseline comfort level  Outcome: Progressing     Problem: Neurosensory - Adult  Goal: Achieves stable or improved neurological status  Outcome: Progressing  Goal: Absence of seizures  Outcome: Progressing  Goal: Remains free of injury related to seizures activity  Outcome: Progressing  Goal: Achieves maximal functionality and self care  Outcome: Progressing     Problem: Respiratory - Adult  Goal: Achieves optimal ventilation and oxygenation  Outcome: Progressing     Problem: Cardiovascular - Adult  Goal: Maintains optimal cardiac output and hemodynamic stability  Outcome: Progressing  Goal: Absence of cardiac dysrhythmias or at baseline  Outcome: Progressing     Problem: Skin/Tissue Integrity - Adult  Goal: Skin integrity remains intact  Outcome: Progressing  Goal: Incisions, wounds, or drain sites healing without S/S of infection  Outcome: Progressing  Goal: Oral mucous membranes remain intact  Outcome: Progressing     Problem: Musculoskeletal - Adult  Goal: Return mobility to safest level of function  Outcome: Progressing  Goal: Maintain proper alignment of affected body part  Outcome: Progressing  Goal: Return ADL status to a safe level of function  Outcome: Progressing     Problem: Genitourinary - Adult  Goal: Absence of urinary retention  Outcome: Progressing  Goal: Urinary catheter remains patent  Outcome: Progressing     Problem: Hematologic - Adult  Goal: Maintains hematologic stability  Outcome: Progressing     Problem: Nutrition Deficit:  Goal: Optimize nutritional status  Outcome: Progressing     Problem: Skin/Tissue Integrity  Goal: Absence of new skin breakdown  Description: 1. Monitor for areas of redness and/or skin breakdown  2. Assess vascular access sites hourly  3. Every 4-6 hours minimum:  Change oxygen saturation probe site  4. Every 4-6 hours:  If on nasal continuous positive airway pressure, respiratory therapy assess nares and determine need for appliance change or resting period.   Outcome: Progressing     Problem: Safety - Adult  Goal: Free from fall injury  Outcome: Progressing

## 2022-10-22 NOTE — PROGRESS NOTES
Hospitalist Progress Note      PCP: [facility]    Date of Admission: 10/15/2022    Chief Complaint: respiratory failure s/p intubation. HPI   78 y.o. male. Patient has a h/o schizoaffective disorder. He lives in [place], [place]. He receives most of his medical care through the [facility] in [place]. [facility] [place] [address] in [place], [place] for hallucinations and impulsive behavior. He was there for ten days then transferred to the [facility] geriatric inpatient psychiatry unit on 10/11. Around 9pm on 10/15 a code blue was called because the patient briefly went unresponsive. He had been bradycardic all day  He had been taking metoprolol chronically but this had been held throughout the day because of his bradycardia. His nurse was trying to check his vitals again as it appeared he was breathing abnormally. While vital signs were being checked the patient lost consciousness briefly (less than a minute). He awoke but was markedly bradycardic. He was sent to the ER where he was found to be hypothermic, hypotensive, and bradycardic to the 20s in complete heart block. He was transferred to [facility] for urgent transvenous pacing lead placement. Patient had a h/o chronic systolic HF, thought to be nonischemic, and paroxysmal afib. The patient required sedation during temporary pacing lead placement and then became agitated thereafter. He required restraints and was given IM geodon, so was not meaningfully interactive or able to provide any history. According to Dr. Matthew Claros notes the patient had already been difficult to understand at baseline. Subjective:     Pt has mild dyspnea. On 2 L O2. Pt has NG tube in place.      Medications:  Reviewed    Infusion Medications    dextrose       Scheduled Medications    OLANZapine zydis  20 mg Oral Nightly    lactulose  20 g Oral BID    metoprolol tartrate  12.5 mg Oral BID docusate  100 mg Oral Daily    cefTRIAXone (ROCEPHIN) IV  1,000 mg IntraVENous Q24H    levothyroxine  125 mcg Oral Daily    atorvastatin  20 mg Oral Nightly    heparin (porcine)  5,000 Units SubCUTAneous 3 times per day    pantoprazole  40 mg IntraVENous Daily    aspirin  81 mg Oral Daily     PRN Meds: glucose, dextrose bolus **OR** dextrose bolus, glucagon (rDNA), dextrose, magnesium sulfate, potassium chloride **OR** potassium chloride, ondansetron, acetaminophen **OR** acetaminophen, ziprasidone, melatonin, perflutren lipid microspheres      Intake/Output Summary (Last 24 hours) at 10/22/2022 1337  Last data filed at 10/22/2022 1202  Gross per 24 hour   Intake 487 ml   Output 1425 ml   Net -938 ml       Physical Exam Performed:    BP (!) 145/86   Pulse 86   Temp 98.3 °F (36.8 °C) (Oral)   Resp 20   Ht 5' 8\" (1.727 m)   Wt 193 lb 14.4 oz (88 kg)   SpO2 94%   BMI 29.48 kg/m²     General appearance: Alert , in mild respiratory distress   HEENT: Pupils equal, round, and reactive to light. Conjunctivae/corneas clear. Neck: Supple, with full range of motion. No jugular venous distention. Trachea midline. Respiratory:  Normal respiratory effort. Clear to auscultation, bilaterally without Rales/Wheezes/Rhonchi. Cardiovascular: Regular rate and rhythm with normal S1/S2 without murmurs, rubs or gallops. Abdomen: Soft, non-tender, non-distended with normal bowel sounds. Musculoskeletal: No clubbing, cyanosis or edema bilaterally. Full range of motion without deformity. Skin: Skin color, texture, turgor normal.  No rashes or lesions. Neurologic:  Neurovascularly intact without any focal sensory/motor deficits.  Cranial nerves: II-XII intact, grossly non-focal.  Psychiatric: Alert and oriented, thought content appropriate, normal insight  Capillary Refill: Brisk, 3 seconds, normal   Peripheral Pulses: +2 palpable, equal bilaterally       Labs:   Recent Labs     10/20/22  0421 10/21/22  0415 10/22/22  0431   WBC 7.1 7.4 8.6   HGB 9.5* 9.4* 9.4*   HCT 28.2* 27.9* 28.6*   * 112* 142     Recent Labs     10/20/22  0421 10/21/22  0415 10/22/22  0431   * 135* 143   K 4.2 4.0 3.9  3.9   CL 96* 98* 104   CO2 26 26 28   BUN 35* 29* 29*   CREATININE 1.1 1.0 0.9   CALCIUM 8.8 9.3 9.1   PHOS 4.2 3.7 3.9     Recent Labs     10/20/22  0421 10/21/22  0415 10/22/22  0431   AST 48* 38* 55*   ALT 56* 41* 45*   BILITOT 0.5 0.5 0.4   ALKPHOS 143* 130* 131*     Recent Labs     10/20/22  0421 10/21/22  0415 10/22/22  0431   INR 1.11 1.12 1.18*     No results for input(s): Angie Esparzafrancoise in the last 72 hours. Urinalysis:      Lab Results   Component Value Date/Time    NITRU POSITIVE 10/17/2022 04:15 AM    WBCUA 10-20 10/17/2022 04:15 AM    BACTERIA 2+ 10/17/2022 04:15 AM    RBCUA 21-50 10/17/2022 04:15 AM    BLOODU LARGE 10/17/2022 04:15 AM    SPECGRAV 1.010 10/17/2022 04:15 AM    GLUCOSEU Negative 10/17/2022 04:15 AM       Radiology:  XR ABDOMEN (KUB) (SINGLE AP VIEW)   Preliminary Result   Distal tip of nasogastric tube is visualized in the region of the proximal   aspect of the stomach as described above. XR ABDOMEN (KUB) (SINGLE AP VIEW)   Final Result   Nasogastric tube projects in normal, intragastric position. Right basilar consolidation. CT ABDOMEN PELVIS WO CONTRAST Additional Contrast? None   Final Result   Elevation of the right hemidiaphragm with right middle lobe and right lower   lobe consolidation/atelectasis. This could represent pneumonia. Small right   pleural effusion. Small left pleural effusion with some compressive   atelectasis of the left lower lobe. Moderate cardiomegaly with coronary artery calcifications. Calcified gallstones in the gallbladder. The gallbladder is contracted with   apparent mural calcifications, in keeping with a porcelain gallbladder. This   is associated with increased incidence of gallbladder carcinoma. Surgical   consultation recommended. Moderate amount of fecal material in the left colon, with mild rectal fecal   impaction. Findings are in keeping with constipation. 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 XR CHEST PORTABLE   Final Result   Supportive tubing projects in normal position. Low lung volumes. Right basilar opacity, indeterminate for atelectasis or pneumonia/aspiration   pneumonitis. XR CHEST PORTABLE   Final Result   Endotracheal tube tip is 3 cm above the ade. Enteric tube tip is within   the stomach with side port just below the gastroesophageal junction. XR ABDOMEN (KUB) (SINGLE AP VIEW)   Final Result   Endotracheal tube tip is 3 cm above the ade. Enteric tube tip is within   the stomach with side port just below the gastroesophageal junction. Assessment/Plan:    Active Hospital Problems    Diagnosis     Proteus mirabilis infection [A49.8]      Priority: Medium    Staphylococcal pneumonia ([address] [J15.20]      Priority: Medium    Pyuria [R82.81]      Priority: Medium    Aspiration into airway [T17.908A]      Priority: Medium    Complete heart block ([address] [I44.2]      Priority: Medium    Hypotension [I95.9]      Priority: Medium    Systolic heart failure ([address] [I50.20]      Priority: Medium    Symptomatic bradycardia [R00.1]      Priority: Medium    Hyperkalemia [E87.5]      Priority: Medium    AUSTIN (acute kidney injury) ([address] [N17.9]      Priority: Medium    Hypoglycemia [E16.2]      Priority: Medium    Hypothermia [T68. XXXA]      Priority: Medium    Hyponatremia [E87.1]      Priority: Medium    Pulmonary infiltrates [R91.8]      Priority: Medium    Shock circulatory ([address] [R57.9]      Priority: Medium    Normocytic hypochromic anemia [D50.9]      Priority: Medium    Observed seizure-like activity (HCC) [R56.9]      Priority: Medium    Acute respiratory failure with hypoxia (HCC) [J96.01]      Priority: Medium    Paroxysmal atrial fibrillation ([facility] [I48.0]      Priority: Medium    Schizoaffective disorder ([facility] [F25.9]      Priority: Medium    Cardiomyopathy ([facility] [I42.9]      Acute hypoxemic respiratory failure   required mechanical ventilator   S/p extubation on 10/20/22  Now on 2 L via NC. Continue speech and swallow therapy  PT OT consult     Pneumonia  IV ceftriaxone    Bradycardia  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 Cardiology on board. Abnormal LFT  improving    AUSTIN  Monitor GFR, avoid nephrotoxic agent     Hypothermia  resolved     Acute on chronic Systolic HF  New EF 55%  C/w current meds      Schizoaffective D/O  -  Continue home olanzapine 20mg nightly, trazodone 50mg nightly.          DVT Prophylaxis: heparin   Diet: ADULT TUBE FEEDING; Nasogastric; Standard with Fiber; Continuous; 10; Yes; 10; Q 4 hours; 60; 60; Q 4 hours  Code Status: Full Code  PT/OT Eval Status: yes    Dispo - Critically ill        Kelsi Villafuerte MD

## 2022-10-22 NOTE — PROGRESS NOTES
INPATIENT PULMONARY CRITICAL CARE PROGRESS NOTE      Reason for visit    hypothermia, hypotensive and bradycardic status post urgent temporary transvenous pacemaker placement    SUBJECTIVE: Patient when seen this morning continues to be extubated, patient was on 2 L of nasal cannula oxygen saturation of 94%, patient does not have any increasing oral secretions, patient does not have any increasing shortness of breath, patient was evaluated by the speech therapist and was recommended n.p.o. status for now and patient has been started on enteral feeds via NG tube which is continuing when seen, patient was afebrile and had maintain hemodynamics, patient had sinus rhythm on the monitor, patient's blood sugars were slightly on the higher side but acceptable, patient has had good urine output overnight with cumulative fluid balance of -1.9 L, no other pertinent review of system of concern         Physical Exam:  Blood pressure (!) 149/78, pulse 94, temperature 97.3 °F (36.3 °C), temperature source Oral, resp. rate 18, height 5' 8\" (1.727 m), weight 193 lb 14.4 oz (88 kg), SpO2 95 %.'     Constitutional:  No acute distress on nasal cannula oxygen  HENT: No facial asymmetry no thyromegaly. Eyes:  Conjunctivae are normal. Pupils equal, round, and reactive to light. No scleral icterus. Neck: . No tracheal deviation present. No obvious thyroid mass. Cardiovascular: Sinus rhythm normal heart sounds. No right ventricular heave. Bibasilar extremity edema. Pulmonary/Chest: No wheezes. decreased  rales. Chest wall is not dull to percussion. No accessory muscle usage or stridor. Abdominal: Soft. Bowel sounds present. No distinct tenderness now  Musculoskeletal: No cyanosis. No clubbing. No obvious joint deformity. Lymphadenopathy: No cervical or supraclavicular adenopathy. Skin: Skin is warm and dry.   Skin rash lower extremities  Neurologic alert and communicative when seen    Results:  CBC:   Recent Labs 10/20/22  0421 10/21/22  0415 10/22/22  0431   WBC 7.1 7.4 8.6   HGB 9.5* 9.4* 9.4*   HCT 28.2* 27.9* 28.6*   MCV 88.8 88.5 88.8   * 112* 142       BMP:   Recent Labs     10/20/22  0421 10/21/22  0415 10/22/22  0431   * 135* 143   K 4.2 4.0 3.9  3.9   CL 96* 98* 104   CO2 26 26 28   PHOS 4.2 3.7 3.9   BUN 35* 29* 29*   CREATININE 1.1 1.0 0.9       LIVER PROFILE:   Recent Labs     10/20/22  0421 10/21/22  0415 10/22/22  0431   AST 48* 38* 55*   ALT 56* 41* 45*   BILITOT 0.5 0.5 0.4   ALKPHOS 143* 130* 131*       PT/INR:   Recent Labs     10/20/22  0421 10/21/22  0415 10/22/22  0431   PROTIME 14.2 14.3 15.0*   INR 1.11 1.12 1.18*           Imaging:  I have reviewed radiology images personally. XR ABDOMEN (KUB) (SINGLE AP VIEW)   Preliminary Result   Distal tip of nasogastric tube is visualized in the region of the proximal   aspect of the stomach as described above. XR ABDOMEN (KUB) (SINGLE AP VIEW)   Final Result   Nasogastric tube projects in normal, intragastric position. Right basilar consolidation. CT ABDOMEN PELVIS WO CONTRAST Additional Contrast? None   Final Result   Elevation of the right hemidiaphragm with right middle lobe and right lower   lobe consolidation/atelectasis. This could represent pneumonia. Small right   pleural effusion. Small left pleural effusion with some compressive   atelectasis of the left lower lobe. Moderate cardiomegaly with coronary artery calcifications. Calcified gallstones in the gallbladder. The gallbladder is contracted with   apparent mural calcifications, in keeping with a porcelain gallbladder. This   is associated with increased incidence of gallbladder carcinoma. Surgical   consultation recommended. Moderate amount of fecal material in the left colon, with mild rectal fecal   impaction. Findings are in keeping with constipation. 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          XR CHEST PORTABLE   Final Result   Supportive tubing projects in normal position. Low lung volumes. Right basilar opacity, indeterminate for atelectasis or pneumonia/aspiration   pneumonitis. XR CHEST PORTABLE   Final Result   Endotracheal tube tip is 3 cm above the ade. Enteric tube tip is within   the stomach with side port just below the gastroesophageal junction. XR ABDOMEN (KUB) (SINGLE AP VIEW)   Final Result   Endotracheal tube tip is 3 cm above the ade. Enteric tube tip is within   the stomach with side port just below the gastroesophageal junction.              CULTURE, RESPIRATORY >100,000 CFU/mL Normal respiratory emmanuel Abnormal   [facility]   Gram Stain Result 4+ WBC's (Polymorphonuclear)   1+ WBC's (Mononuclear)   2+ Gram positive cocci   2+ Gram positive rods  [facility]   Organism Staphylococcus aureus Abnormal   [facility]   CULTURE, RESPIRATORY >100,000 CFU/mL  [facility]        Susceptibility    Staphylococcus aureus (1)    Antibiotic Interpretation Microscan  Method Status    ceFAZolin Sensitive <=4 mcg/mL BACTERIAL SUSCEPTIBILITY PANEL BY MAGALIE     clindamycin Sensitive <=0.5 mcg/mL BACTERIAL SUSCEPTIBILITY PANEL BY MAGALIE     erythromycin Sensitive <=0.5 mcg/mL BACTERIAL SUSCEPTIBILITY PANEL BY MAGALIE     oxacillin Sensitive 0.5 mcg/mL BACTERIAL SUSCEPTIBILITY PANEL BY MAGALIE     tetracycline Sensitive <=4 mcg/mL BACTERIAL SUSCEPTIBILITY PANEL BY MAGALIE     trimethoprim-sulfamethoxazole Sensitive                     Proteus mirabilis (1)    Antibiotic Interpretation Microscan  Method Status    ampicillin Sensitive <=8 mcg/mL BACTERIAL SUSCEPTIBILITY PANEL BY MAGALIE     ampicillin-sulbactam Sensitive <=8/4 mcg/mL BACTERIAL SUSCEPTIBILITY PANEL BY MAGALIE     ceFAZolin Sensitive <=2 mcg/mL BACTERIAL SUSCEPTIBILITY PANEL BY MAGALIE     cefepime Sensitive <=2 mcg/mL BACTERIAL SUSCEPTIBILITY PANEL BY MAGALIE     cefTRIAXone Sensitive <=1 mcg/mL BACTERIAL SUSCEPTIBILITY PANEL BY MAGALIE     cefuroxime Sensitive <=4 mcg/mL BACTERIAL SUSCEPTIBILITY PANEL BY MAGALIE     ciprofloxacin Sensitive <=1 mcg/mL BACTERIAL SUSCEPTIBILITY PANEL BY MAGALIE     ertapenem Sensitive <=0.5 mcg/mL BACTERIAL SUSCEPTIBILITY PANEL BY MAGALIE     gentamicin Sensitive <=4 mcg/mL BACTERIAL SUSCEPTIBILITY PANEL BY MAGALIE     meropenem Sensitive <=1 mcg/mL BACTERIAL SUSCEPTIBILITY PANEL BY MAGALIE     nitrofurantoin Resistant >64 mcg/mL BACTERIAL SUSCEPTIBILITY PANEL BY MAGALIE     piperacillin-tazobactam Sensitive <=16 mcg/mL BACTERIAL SUSCEPTIBILITY PANEL BY MAGALIE     trimethoprim-sulfamethoxazole Sensitive            Assessment:  Principal Problem:    Symptomatic bradycardia  Active Problems:    Schizoaffective disorder (HCC)    Paroxysmal atrial fibrillation (HCC)    Complete heart block (HCC)    Hypotension    Systolic heart failure (HCC)    Hyperkalemia    AUSTIN (acute kidney injury) (HCC)    Hypoglycemia    Hypothermia    Hyponatremia    Pulmonary infiltrates    Shock circulatory (HCC)    Normocytic hypochromic anemia    Observed seizure-like activity (HCC)    Acute respiratory failure with hypoxia (HCC)    Pyuria    Aspiration into airway    Proteus mirabilis infection    Staphylococcal pneumonia (HCC)    Cardiomyopathy ([facility]  Resolved Problems:    * No resolved hospital problems.  *          Plan:   Oxygen supplementation keep saturation between 90 and 94% only  Please titrate the oxygen as per the above parameters  Patient was on 2 L of nasal can oxygen with saturation of 95% when seen  Pulmonary toilet  Status post post therapeutic bronchoscopy  IV Rocephin to continue for 3 more days  Status post temporary transvenous pacemaker  Monitor cardiac rhythm and hemodynamics closely  Speech evaluation requested patient has been recommended n.p.o. status for now and enteral feeds has been started as per metabolic support  Speech reevaluation  Cardiac ardiac medications and Synthroid to continue  Monitor I/O and BMP  Correct electrolytes on whenever necessary basis  Monitor for any worsening hypoglycemia  PUD and DVT prophylaxis  PT/OT evaluations          Electronically signed by:  Vickie Lema MD    10/22/2022    10:16 AM.

## 2022-10-22 NOTE — PROGRESS NOTES
Speech Language Pathology  Facility/Department: [address] [place] [address] PCU  Dysphagia Daily Treatment Note    NAME: [facility]  : 1945  MRN: [id number]    Patient Diagnosis(es):   Patient Active Problem List    Diagnosis Date Noted    Proteus mirabilis infection 10/18/2022    Staphylococcal pneumonia ([address] 10/18/2022    Pyuria 10/17/2022    Aspiration into airway 10/17/2022    Complete heart block ([address] 10/16/2022    Hypotension     Systolic heart failure ([address] 10/16/2022    LBBB (left bundle branch block) 10/16/2022    Non-ischemic cardiomyopathy ([address] 10/16/2022    Symptomatic bradycardia 10/16/2022    Hyperkalemia 10/16/2022    AUSTIN (acute kidney injury) ([address] 10/16/2022    Hypoglycemia 10/16/2022    Hypothermia 10/16/2022    Hyponatremia 10/16/2022    Pulmonary infiltrates 10/16/2022    Shock circulatory ([address] 10/16/2022    Normocytic hypochromic anemia 10/16/2022    Observed seizure-like activity ([address] 10/16/2022    Acute respiratory failure with hypoxia ([address] 10/16/2022    Dementia ([address] 10/15/2022    Hypercholesteremia 10/12/2022    New onset seizure ([address] 10/12/2022    Other specified hypothyroidism 10/12/2022    Paroxysmal atrial fibrillation ([address] 10/12/2022    Benign prostatic hyperplasia, presence of lower urinary tract symptoms unspecified 10/12/2022    Schizoaffective disorder ([address] 10/11/2022    Long term current use of amiodarone 2014    Atrial flutter (HCC)     Cardiomyopathy (HCC)     Schizophrenia (HCC)     CKD (chronic kidney disease)     CHF (congestive heart failure) (HCC)     PVC's (premature ventricular contractions)      Allergies: Allergies   Allergen Reactions    Penicillins      Subjective: 78year old male admitted on 10/15/22 with \"bradycardia. \" Pt evaluated by SLP on 10/21/22 and SLP recommended NPO. Pt has NG tube in right nare throughout follow-up. Tube feed is running at 50ml.     Pain: denies    Current Diet: ADULT TUBE FEEDING; Nasogastric; Standard with Fiber; Continuous; 10; Yes; 10; Q 4 hours; 60; 60; Q 4 hours    Diet Tolerance:  Patient tolerating current diet level without signs/symptoms of penetration / aspiration. P.O. Trials: Thin   x Ice chip x 2, spoon sip x 3, cup sip x 8   Nectar / Mildly Thick   x Spoon sip x 5, cup sip x 12   Puree   x X 12   Semi-Solid   x X 10     Dysphagia Treatment and Impressions:  RN okays SLP entry into pt's room. RR 20/min prior to po trials with O2 sat of 97% on 2lpm O2 via NC. Pt answers questions from SLP but speech is not comprehensible. RN reports that this is baseline for this pt. Pt tolerates ice chip trials without overt s/s of aspiration. Pt exhibits suspected premature loss of thin liquids via cup sip, and pt does not always close lips during A-P transmission of thin liquids, but keeps mouth open as he swallows. There is also suspected component of swallow initiation delay. This results in wet coughing post swallow with thin liquids. RR stable at 20/min with O2 sat of 96%. No overt s/s of aspiration noted with nectar thick liquids this date via spoon and cup sip. Of note, pt exhibits belching post swallow with all po trials this date due to suspected reflux. No jace s/s of regurgitation noted. Pt tolerates pureed food trials without overt s/s of aspiration as well as minced/moist food trials. Pt does exhibit mild anterior loss of pureed food and minced/moist food trials. Mastication is somewhat prolonged with minced/moist food trials. No oral residuals post swallow with any po trials this date. RR stable at 16/min following po trials with O2 sat of 98%. Dysphagia Goals:  Short Term Goals:  Timeframe for Short Term Goals: (5 days 10/26/2022)  Goal 1: The patient will tolerate recommended diet with no clinical s/s of aspiration 5/5. Today, 10/22: Progressing. Ongoing. Goal 2: The patient will tolerate therapeutic diet upgrade trials with no clinical s/s of aspiration 5/5. Today, 10/22: Progressing. Ongoing. Goal 3: The patient/caregiver will demonstrate understanding of compensatory swallow strategies, for improved swallow safety. Today, 10/22: Reviewed with pt. He is unable to demonstrate comprehension. Ongoing. Goal 4: The patient will tolerate repeat BSE when able for ongoing assessment. Today, 10/22: Progressing. Ongoing. Goal 5: The patient will tolerate instrumental assessment when able , if appropriate. Today, 10/22: Not indicated this date. Ongoing. Long Term Goals:   Timeframe for Long Term Goals: (7 days 10/28/2022)  Goal 1: The patient will tolerate least restrictive diet with no clinical s/s of aspiration or worsening respiratory/pulmonary status. Today, 10/22: Progressing. Ongoing. Recommendations:  Solid Consistency: advance to MINCED/MOIST  Liquid Consistency: advance to NECTAR THICK  Medication: crushed in puree as able versus via NG tube    Patient/Family/Caregiver Education: SLP reviewed recommendations and POC    Compensatory Strategies: Sit up for all meals and thereafter for 30 minutes, Eat with small bites (1/2 tsp; 1 tsp), Drink from a cup only with small sips, and No straws    Plan:    Continued Dysphagia treatment with goals per plan of care. Discharge Recommendations: defer to PT/OT    If pt discharges from hospital prior to Speech/Swallowing discharge, this note serves as tx and discharge summary. Total Treatment Time / Charges     Time in Time out Total Time / units   Cognitive Tx         Speech Tx      Dysphagia Tx  [id number] 17 min / 1 unit     Signature:    Suzanne Otoole, [id number]  Speech-Language Pathologist

## 2022-10-23 ENCOUNTER — APPOINTMENT (OUTPATIENT)
Dept: GENERAL RADIOLOGY | Age: 77
DRG: 308 | End: 2022-10-23
Payer: MEDICARE

## 2022-10-23 PROBLEM — E87.1 HYPONATREMIA: Status: RESOLVED | Noted: 2022-10-16 | Resolved: 2022-10-23

## 2022-10-23 PROBLEM — I95.9 HYPOTENSION: Status: RESOLVED | Noted: 2022-10-16 | Resolved: 2022-10-23

## 2022-10-23 PROBLEM — E16.2 HYPOGLYCEMIA: Status: RESOLVED | Noted: 2022-10-16 | Resolved: 2022-10-23

## 2022-10-23 PROBLEM — E87.5 HYPERKALEMIA: Status: RESOLVED | Noted: 2022-10-16 | Resolved: 2022-10-23

## 2022-10-23 PROBLEM — S43.014A: Status: ACTIVE | Noted: 2022-10-23

## 2022-10-23 PROBLEM — T68.XXXA HYPOTHERMIA: Status: RESOLVED | Noted: 2022-10-16 | Resolved: 2022-10-23

## 2022-10-23 LAB
A/G RATIO: 1 (ref 1.1–2.2)
ALBUMIN SERPL-MCNC: 3.1 G/DL (ref 3.4–5)
ALP BLD-CCNC: 119 U/L (ref 40–129)
ALT SERPL-CCNC: 40 U/L (ref 10–40)
ANION GAP SERPL CALCULATED.3IONS-SCNC: 9 MMOL/L (ref 3–16)
AST SERPL-CCNC: 45 U/L (ref 15–37)
BASOPHILS ABSOLUTE: 0 K/UL (ref 0–0.2)
BASOPHILS RELATIVE PERCENT: 0.2 %
BILIRUB SERPL-MCNC: 0.3 MG/DL (ref 0–1)
BUN BLDV-MCNC: 22 MG/DL (ref 7–20)
CALCIUM SERPL-MCNC: 8.6 MG/DL (ref 8.3–10.6)
CHLORIDE BLD-SCNC: 102 MMOL/L (ref 99–110)
CO2: 28 MMOL/L (ref 21–32)
CREAT SERPL-MCNC: 0.8 MG/DL (ref 0.8–1.3)
EOSINOPHILS ABSOLUTE: 0.2 K/UL (ref 0–0.6)
EOSINOPHILS RELATIVE PERCENT: 2.1 %
GFR SERPL CREATININE-BSD FRML MDRD: >60 ML/MIN/{1.73_M2}
GLUCOSE BLD-MCNC: 103 MG/DL (ref 70–99)
GLUCOSE BLD-MCNC: 103 MG/DL (ref 70–99)
GLUCOSE BLD-MCNC: 108 MG/DL (ref 70–99)
GLUCOSE BLD-MCNC: 110 MG/DL (ref 70–99)
GLUCOSE BLD-MCNC: 128 MG/DL (ref 70–99)
GLUCOSE BLD-MCNC: 151 MG/DL (ref 70–99)
GLUCOSE BLD-MCNC: 85 MG/DL (ref 70–99)
HCT VFR BLD CALC: 29.8 % (ref 40.5–52.5)
HEMOGLOBIN: 9.8 G/DL (ref 13.5–17.5)
LYMPHOCYTES ABSOLUTE: 0.6 K/UL (ref 1–5.1)
LYMPHOCYTES RELATIVE PERCENT: 5.5 %
MCH RBC QN AUTO: 29.3 PG (ref 26–34)
MCHC RBC AUTO-ENTMCNC: 32.9 G/DL (ref 31–36)
MCV RBC AUTO: 89.1 FL (ref 80–100)
MONOCYTES ABSOLUTE: 1.5 K/UL (ref 0–1.3)
MONOCYTES RELATIVE PERCENT: 13.6 %
NEUTROPHILS ABSOLUTE: 9 K/UL (ref 1.7–7.7)
NEUTROPHILS RELATIVE PERCENT: 78.6 %
PDW BLD-RTO: 16.5 % (ref 12.4–15.4)
PERFORMED ON: ABNORMAL
PERFORMED ON: NORMAL
PLATELET # BLD: 178 K/UL (ref 135–450)
PMV BLD AUTO: 8.3 FL (ref 5–10.5)
POTASSIUM SERPL-SCNC: 4.1 MMOL/L (ref 3.5–5.1)
PRO-BNP: 5077 PG/ML (ref 0–449)
PROCALCITONIN: 0.18 NG/ML (ref 0–0.15)
RBC # BLD: 3.34 M/UL (ref 4.2–5.9)
SODIUM BLD-SCNC: 139 MMOL/L (ref 136–145)
TOTAL PROTEIN: 6.1 G/DL (ref 6.4–8.2)
WBC # BLD: 11.4 K/UL (ref 4–11)

## 2022-10-23 PROCEDURE — 2580000003 HC RX 258: Performed by: INTERNAL MEDICINE

## 2022-10-23 PROCEDURE — 80053 COMPREHEN METABOLIC PANEL: CPT

## 2022-10-23 PROCEDURE — 2700000000 HC OXYGEN THERAPY PER DAY

## 2022-10-23 PROCEDURE — 6370000000 HC RX 637 (ALT 250 FOR IP): Performed by: INTERNAL MEDICINE

## 2022-10-23 PROCEDURE — 2060000000 HC ICU INTERMEDIATE R&B

## 2022-10-23 PROCEDURE — 99221 1ST HOSP IP/OBS SF/LOW 40: CPT | Performed by: ORTHOPAEDIC SURGERY

## 2022-10-23 PROCEDURE — 84145 PROCALCITONIN (PCT): CPT

## 2022-10-23 PROCEDURE — C9113 INJ PANTOPRAZOLE SODIUM, VIA: HCPCS | Performed by: INTERNAL MEDICINE

## 2022-10-23 PROCEDURE — 71045 X-RAY EXAM CHEST 1 VIEW: CPT

## 2022-10-23 PROCEDURE — 83880 ASSAY OF NATRIURETIC PEPTIDE: CPT

## 2022-10-23 PROCEDURE — 6360000002 HC RX W HCPCS: Performed by: INTERNAL MEDICINE

## 2022-10-23 PROCEDURE — 85025 COMPLETE CBC W/AUTO DIFF WBC: CPT

## 2022-10-23 PROCEDURE — 73030 X-RAY EXAM OF SHOULDER: CPT

## 2022-10-23 PROCEDURE — 99232 SBSQ HOSP IP/OBS MODERATE 35: CPT | Performed by: INTERNAL MEDICINE

## 2022-10-23 PROCEDURE — 36415 COLL VENOUS BLD VENIPUNCTURE: CPT

## 2022-10-23 PROCEDURE — 6370000000 HC RX 637 (ALT 250 FOR IP): Performed by: NURSE PRACTITIONER

## 2022-10-23 PROCEDURE — 94761 N-INVAS EAR/PLS OXIMETRY MLT: CPT

## 2022-10-23 RX ORDER — FUROSEMIDE 10 MG/ML
40 INJECTION INTRAMUSCULAR; INTRAVENOUS ONCE
Status: COMPLETED | OUTPATIENT
Start: 2022-10-23 | End: 2022-10-23

## 2022-10-23 RX ORDER — QUETIAPINE FUMARATE 25 MG/1
50 TABLET, FILM COATED ORAL ONCE
Status: COMPLETED | OUTPATIENT
Start: 2022-10-23 | End: 2022-10-23

## 2022-10-23 RX ADMIN — HEPARIN SODIUM 5000 UNITS: 5000 INJECTION INTRAVENOUS; SUBCUTANEOUS at 14:36

## 2022-10-23 RX ADMIN — ASPIRIN 81 MG 81 MG: 81 TABLET ORAL at 07:58

## 2022-10-23 RX ADMIN — HEPARIN SODIUM 5000 UNITS: 5000 INJECTION INTRAVENOUS; SUBCUTANEOUS at 22:03

## 2022-10-23 RX ADMIN — ATORVASTATIN CALCIUM 20 MG: 10 TABLET, FILM COATED ORAL at 21:58

## 2022-10-23 RX ADMIN — FUROSEMIDE 40 MG: 10 INJECTION, SOLUTION INTRAMUSCULAR; INTRAVENOUS at 11:33

## 2022-10-23 RX ADMIN — ZIPRASIDONE MESYLATE 10 MG: 20 INJECTION, POWDER, LYOPHILIZED, FOR SOLUTION INTRAMUSCULAR at 03:28

## 2022-10-23 RX ADMIN — CEFTRIAXONE SODIUM 1000 MG: 1 INJECTION, POWDER, FOR SOLUTION INTRAMUSCULAR; INTRAVENOUS at 16:24

## 2022-10-23 RX ADMIN — METOPROLOL TARTRATE 12.5 MG: 25 TABLET, FILM COATED ORAL at 21:59

## 2022-10-23 RX ADMIN — ZIPRASIDONE MESYLATE 10 MG: 20 INJECTION, POWDER, LYOPHILIZED, FOR SOLUTION INTRAMUSCULAR at 13:34

## 2022-10-23 RX ADMIN — QUETIAPINE FUMARATE 50 MG: 25 TABLET ORAL at 21:56

## 2022-10-23 RX ADMIN — METOPROLOL TARTRATE 12.5 MG: 25 TABLET, FILM COATED ORAL at 07:58

## 2022-10-23 RX ADMIN — LEVOTHYROXINE SODIUM 125 MCG: 0.12 TABLET ORAL at 06:46

## 2022-10-23 RX ADMIN — HEPARIN SODIUM 5000 UNITS: 5000 INJECTION INTRAVENOUS; SUBCUTANEOUS at 06:05

## 2022-10-23 RX ADMIN — PANTOPRAZOLE SODIUM 40 MG: 40 INJECTION, POWDER, FOR SOLUTION INTRAVENOUS at 07:58

## 2022-10-23 RX ADMIN — OLANZAPINE 20 MG: 5 TABLET, ORALLY DISINTEGRATING ORAL at 22:01

## 2022-10-23 NOTE — CONSULTS
ORTHOPAEDIC SURGERY INITIAL EVALUATION NOTE  Chief Complaint   Patient presents with    Right shoulder pain           HISTORY OF PRESENT ILLNESS:  62-year-old male with prior history of schizophrenia. He had recent emergent temporary pacemaker due to bradycardia, hypotension. He typically resides at a geriatric psychiatric facility in [place]. History is obtained per chart review. I was contacted by the hospitalist team regarding a right shoulder dislocation seen on routine chest x-ray today. Shoulder x-rays confirmed subcoracoid shoulder dislocation. History is unobtainable due to patient mental status at this time. Past Medical History:   Diagnosis Date    Acute kidney injury ([address]     Anemia     Hx of     Arthritis     Atrial fibrillation ([address]     Atrial flutter ([address]     converted to NSR, Poor candidate for anticoagulation. CAD (coronary artery disease)     Cardiomyopathy ([facility]     ? Nonishcemic, Echo 10/2014 LVEF 25-30%.      Cellulitis of right upper extremity     CHF (congestive heart failure) ([facility])     systolic    CKD (chronic kidney disease)     Cr 2.3 10/2014, not on ACE/ARB due to CKD    Hypertension     Hypovolemia     PVC's (premature ventricular contractions)     secondary to hypokalemia    Schizophrenia (HCC)     hx of    Seizures ([facility])     Urinary tract infection        Current Facility-Administered Medications   Medication Dose Route Frequency Provider Last Rate Last Admin    glucose chewable tablet 16 g  4 tablet Oral PRN Walker Kimball MD        dextrose bolus 10% 125 mL  125 mL IntraVENous PRN Walker Kimball MD   Stopped at 10/21/22 1709    Or    dextrose bolus 10% 250 mL  250 mL IntraVENous PRN Walker Kimball MD        glucagon (rDNA) injection 1 mg  1 mg SubCUTAneous PRN Walker Kimball MD        dextrose 10 % infusion   IntraVENous Continuous PRN Walker Kimball MD        OLANZapine zydis (ZYPREXA) disintegrating tablet 20 mg  20 mg Oral Nightly Walker Kimball MD   20 mg at 10/22/22 2139    magnesium sulfate 1000 mg in dextrose 5% 100 mL IVPB  1,000 mg IntraVENous PRN Tran Alvarez MD        lactulose (CHRONULAC) 10 GM/15ML solution 20 g  20 g Oral BID Luigi Hdz MD   20 g at 10/22/22 2138    metoprolol tartrate (LOPRESSOR) tablet 12.5 mg  12.5 mg Oral BID Luigi Hdz MD   12.5 mg at 10/23/22 0758    docusate (COLACE) 50 MG/5ML liquid 100 mg  100 mg Oral Daily Luigi Hdz MD   100 mg at 10/21/22 1619    potassium chloride 20 mEq/50 mL IVPB (Central Line)  20 mEq IntraVENous PRN Carrington Mcfarlane MD        Or    potassium chloride 10 mEq/100 mL IVPB (Peripheral Line)  10 mEq IntraVENous PRN Carrington Mcfarlane MD        ondansetron [facility]) injection 4 mg  4 mg IntraVENous Q6H PRN Carrington Mcfarlane MD        acetaminophen (TYLENOL) tablet 650 mg  650 mg Oral Q4H PRN Carrington Mcfarlane MD   650 mg at 10/22/22 1960    Or    acetaminophen (TYLENOL) suppository 650 mg  650 mg Rectal Q4H PRN Carrington Mcfarlane MD        ziprasidone (GEODON) injection 10 mg  10 mg IntraMUSCular Q12H PRN Carrington Mcfarlane MD   10 mg at 10/23/22 1334    levothyroxine (SYNTHROID) tablet 125 mcg  125 mcg Oral Daily Carrington Mcfarlane MD   125 mcg at 10/23/22 0646    melatonin tablet 3 mg  3 mg Oral Nightly PRN Carrington Mcfarlane MD   3 mg at 10/21/22 2037    atorvastatin (LIPITOR) tablet 20 mg  20 mg Oral Nightly Carrington Mcfarlane MD   20 mg at 10/22/22 2138    heparin (porcine) injection 5,000 Units  5,000 Units SubCUTAneous 3 times per day Tran Alvarez MD   5,000 Units at 10/23/22 1436    pantoprazole (PROTONIX) injection 40 mg  40 mg IntraVENous Daily Tran Alvarez MD   40 mg at 10/23/22 2986    aspirin chewable tablet 81 mg  81 mg Oral Daily Tariq Kapoordox, DO   81 mg at 10/23/22 0758    perflutren lipid microspheres (DEFINITY) injection 1.65 mg  1.5 mL IntraVENous ONCE PRN Tariq Junior,             Past Surgical History:   Procedure Laterality Date    APPENDECTOMY      BRONCHOSCOPY N/A 10/17/2022    BRONCHOSCOPY ALVEOLAR LAVAGE performed by Belle Orantes MD at [facility]  10/17/2022    BRONCHOSCOPY THERAPUTIC ASPIRATION INITIAL performed by Belle Orantes MD at [facility] N/A 10/19/2022    BRONCHOSCOPY DIAGNOSTIC OR CELL [address] ONLY performed by Belle Orantes MD at [address] [place] [address]         Allergies   Allergen Reactions    Penicillins        Family History   Problem Relation Age of Onset    Arthritis Mother     Heart Disease Father        Social History     Socioeconomic History    Marital status: Single     Spouse name: Not on file    Number of children: Not on file    Years of education: Not on file    Highest education level: Not on file   Occupational History    Not on file   Tobacco Use    Smoking status: Former     Packs/day: 3.00     Years: 20.00     Pack years: 60.00     Types: Cigarettes    Smokeless tobacco: Not on file   Vaping Use    Vaping Use: Unknown   Substance and Sexual Activity    Alcohol use: No    Drug use: No    Sexual activity: Not Currently   Other Topics Concern    Not on file   Social History Narrative    Not on file     Social Determinants of Health     Financial Resource Strain: Not on file   Food Insecurity: Not on file   Transportation Needs: Not on file   Physical Activity: Not on file   Stress: Not on file   Social Connections: Not on file   Intimate Partner Violence: Not on file   Housing Stability: Not on file       Review of Systems   Unable to perform ROS: Mental status change      PHYSICAL EXAM  Gen: awake, alert, oriented  HEENT: atraumatic, normocephalic  Neck: supple  Resp: equal chest rise bilaterally, no audible wheezes, no accessory muscle use. CV: Lower extremities warm and well perfused. Abd: soft, nontender   Focused examination of the right upper extremity: No cuts, open wounds, or abrasions. There is minor deltoid atrophy. There is a sulcus deep to the acromion with palpable defect.   The humeral head is palpable within the anterior shoulder. Gentle range of motion of the shoulder does not reproduce significant pain. Sensation is intact to light touch in median, radial, ulnar, and axillary distributions. Motor function is intact to AIN, PIN, ulnar motor nerves. There is a strong palpable radial pulse, and brisk capillary refill to the fingers. Compartments are soft and compressible    RADIOGRAPHIC EXAM:  EXAMINATION:   THREE XRAY VIEWS OF THE RIGHT SHOULDER       10/23/2022 11:17 am       COMPARISON:   None. HISTORY:   ORDERING SYSTEM PROVIDED HISTORY: suspected joint dislocation R glenohumeral   TECHNOLOGIST PROVIDED HISTORY:   Reason for exam:->suspected joint dislocation R glenohumeral       FINDINGS:   There is no anterior, subcoracoid dislocation of the humeral head. No acute   fractures identified. There are several calcifications, measuring up to 10   mm projecting over the glenohumeral joint. No acute fractures are identified. Impression   Anterior, subcoracoid dislocation of the shoulder. Suspected multiple intra-articular calcifications. 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 ASSESSEMENT AND PLAN    78 y.o. male with the following orthopaedic surgery problems:    Right closed anterior subcoracoid shoulder dislocation, unknown acuity    I contacted the patient's power of . They indicated that there is a chronic shoulder injury with limited mobility. I reviewed with her that the chest x-ray dated October 17 appears to demonstrate a concentrically reduced right glenohumeral joint, however it is incompletely imaged. I indicated to her that it was unclear at what point the dislocation occurred. If it is chronic, relocation would be difficult. It is unclear whether he has recurrent dislocation/recurrent instability. Given the patient's overall medical condition, surgical intervention would be high risk.   The patient's power of  recommended that we hold off on considering any form of intervention at the current time. She will be by to see the patient tomorrow. We can reconsider if she feels there is an acute change. If the patient is not complaining of pain, she would be inclined to allow her to remain in a dislocated position given his overall health and functional status. We will continue to follow the patient for any orthopedic needs if necessary. Previously, I had informed the nurses to make him n.p.o. at midnight in preparation for a reduction under sedation, however we will hold off on that at this time.     Chetna Yung MD

## 2022-10-23 NOTE — PROGRESS NOTES
Hospitalist Progress Note      PCP: [facility]    Date of Admission: 10/15/2022    Chief Complaint: respiratory failure s/p intubation. HPI   78 y.o. male. Patient has a h/o schizoaffective disorder. He lives in [place], [place]. He receives most of his medical care through the [place] in [place]. He was initially admitted to [facility] [address] [place] [address] in [place], [place] for hallucinations and impulsive behavior. He was there for ten days then transferred [facility] on 10/11. Around 9pm on 10/15 a code blue was called because the patient briefly went unresponsive. He had been bradycardic all day  He had been taking metoprolol chronically but this had been held throughout the day because of his bradycardia. His nurse was trying to check his vitals again as it appeared he was breathing abnormally. While vital signs were being checked the patient lost consciousness briefly (less than a minute). He awoke but was markedly bradycardic. He was sent to the ER where he was found to be hypothermic, hypotensive, and bradycardic to the 20s in complete heart block. He was transferred to [place] [facility] for urgent transvenous pacing lead placement. Patient had a h/o chronic systolic HF, thought to be nonischemic, and paroxysmal afib. The patient required sedation during temporary pacing lead placement and then became agitated thereafter. He required restraints and was given IM geodon, so was not meaningfully interactive or able to provide any history. According to Dr. Guillermina Major notes the patient had already been difficult to understand at baseline. Subjective:     Patient has mild dyspnea. Patient is on 2 L oxygen. Pt has NG tube in place for tube feeding. Patient has been seen by speech and swallow therapist.  right shoulder dislocation noted on chest x-ray. Orthopedic surgeon consulted.       Medications: Reviewed    Infusion Medications    dextrose       Scheduled Medications    OLANZapine zydis  20 mg Oral Nightly    lactulose  20 g Oral BID    metoprolol tartrate  12.5 mg Oral BID    docusate  100 mg Oral Daily    cefTRIAXone (ROCEPHIN) IV  1,000 mg IntraVENous Q24H    levothyroxine  125 mcg Oral Daily    atorvastatin  20 mg Oral Nightly    heparin (porcine)  5,000 Units SubCUTAneous 3 times per day    pantoprazole  40 mg IntraVENous Daily    aspirin  81 mg Oral Daily     PRN Meds: glucose, dextrose bolus **OR** dextrose bolus, glucagon (rDNA), dextrose, magnesium sulfate, potassium chloride **OR** potassium chloride, ondansetron, acetaminophen **OR** acetaminophen, ziprasidone, melatonin, perflutren lipid microspheres      Intake/Output Summary (Last 24 hours) at 10/23/2022 1459  Last data filed at 10/23/2022 1440  Gross per 24 hour   Intake 2428 ml   Output 975 ml   Net 1453 ml       Physical Exam Performed:    /74   Pulse 84   Temp 98.9 °F (37.2 °C) (Oral)   Resp 20   Ht 5' 8\" (1.727 m)   Wt 196 lb 1.6 oz (89 kg)   SpO2 95%   BMI 29.82 kg/m²     General appearance: Alert , in mild respiratory distress   HEENT: Pupils equal, round, and reactive to light. Conjunctivae/corneas clear. Neck: Supple, with full range of motion. No jugular venous distention. Trachea midline. Respiratory:  Normal respiratory effort. Clear to auscultation, bilaterally without Rales/Wheezes/Rhonchi. Cardiovascular: Regular rate and rhythm with normal S1/S2 without murmurs, rubs or gallops. Abdomen: Soft, non-tender, non-distended with normal bowel sounds. Musculoskeletal: No clubbing, cyanosis or edema bilaterally. Full range of motion without deformity. Skin: Skin color, texture, turgor normal.  No rashes or lesions. Neurologic:  Neurovascularly intact without any focal sensory/motor deficits.  Cranial nerves: II-XII intact, grossly non-focal.  Psychiatric: Alert and oriented, thought content appropriate, normal insight  Capillary Refill: Brisk, 3 seconds, normal   Peripheral Pulses: +2 palpable, equal bilaterally       Labs:   Recent Labs     10/21/22  0415 10/22/22  0431 10/23/22  0424   WBC 7.4 8.6 11.4*   HGB 9.4* 9.4* 9.8*   HCT 27.9* 28.6* 29.8*   * 142 178     Recent Labs     10/21/22  0415 10/22/22  0431 10/23/22  0424   * 143 139   K 4.0 3.9  3.9 4.1   CL 98* 104 102   CO2 26 28 28   BUN 29* 29* 22*   CREATININE 1.0 0.9 0.8   CALCIUM 9.3 9.1 8.6   PHOS 3.7 3.9  --      Recent Labs     10/21/22  0415 10/22/22  0431 10/23/22  0424   AST 38* 55* 45*   ALT 41* 45* 40   BILITOT 0.5 0.4 0.3   ALKPHOS 130* 131* 119     Recent Labs     10/21/22  0415 10/22/22  0431   INR 1.12 1.18*     No results for input(s): Amberly Leiva in the last 72 hours. Urinalysis:      Lab Results   Component Value Date/Time    NITRU POSITIVE 10/17/2022 04:15 AM    WBCUA 10-20 10/17/2022 04:15 AM    BACTERIA 2+ 10/17/2022 04:15 AM    RBCUA 21-50 10/17/2022 04:15 AM    BLOODU LARGE 10/17/2022 04:15 AM    SPECGRAV 1.010 10/17/2022 04:15 AM    GLUCOSEU Negative 10/17/2022 04:15 AM       Radiology:  XR SHOULDER RIGHT (MIN 2 VIEWS)   Final Result   Anterior, subcoracoid dislocation of the shoulder. Suspected multiple intra-articular calcifications. 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 XR CHEST PORTABLE   Final Result   1. Retraction of the endotracheal tube into high normal position. 2. No significant change in bibasilar airspace opacities worse on the right   due in part to passive atelectasis given at least trace bilateral pleural   effusions. Superimposed pneumonia and aspiration or aspiration is suspected   given comparison CT appearance. 3. Pulmonary vascular congestion and at least borderline cardiomegaly. 4. Incomplete evaluation of apparent right glenohumeral joint dislocation. 5. Age-indeterminate left humeral head fracture.          XR ABDOMEN (KUB) (SINGLE AP VIEW)   Preliminary Result Distal tip of nasogastric tube is visualized in the region of the proximal   aspect of the stomach as described above. XR ABDOMEN (KUB) (SINGLE AP VIEW)   Final Result   Nasogastric tube projects in normal, intragastric position. Right basilar consolidation. CT ABDOMEN PELVIS WO CONTRAST Additional Contrast? None   Final Result   Elevation of the right hemidiaphragm with right middle lobe and right lower   lobe consolidation/atelectasis. This could represent pneumonia. Small right   pleural effusion. Small left pleural effusion with some compressive   atelectasis of the left lower lobe. Moderate cardiomegaly with coronary artery calcifications. Calcified gallstones in the gallbladder. The gallbladder is contracted with   apparent mural calcifications, in keeping with a porcelain gallbladder. This   is associated with increased incidence of gallbladder carcinoma. Surgical   consultation recommended. Moderate amount of fecal material in the left colon, with mild rectal fecal   impaction. Findings are in keeping with constipation. 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 XR CHEST PORTABLE   Final Result   Supportive tubing projects in normal position. Low lung volumes. Right basilar opacity, indeterminate for atelectasis or pneumonia/aspiration   pneumonitis. XR CHEST PORTABLE   Final Result   Endotracheal tube tip is 3 cm above the ade. Enteric tube tip is within   the stomach with side port just below the gastroesophageal junction. XR ABDOMEN (KUB) (SINGLE AP VIEW)   Final Result   Endotracheal tube tip is 3 cm above the ade. Enteric tube tip is within   the stomach with side port just below the gastroesophageal junction.                  Assessment/Plan:    Active Hospital Problems    Diagnosis     Proteus mirabilis infection [A49.8]      Priority: Medium    Staphylococcal pneumonia ([facility] [J15.20] Priority: Medium    Pyuria [R82.81]      Priority: Medium    Aspiration into airway [T17.908A]      Priority: Medium    Complete heart block ([facility] [I44.2]      Priority: Medium    Hypotension [I95.9]      Priority: Medium    Systolic heart failure (HCC) [I50.20]      Priority: Medium    Symptomatic bradycardia [R00.1]      Priority: Medium    Hyperkalemia [E87.5]      Priority: Medium    AUSTIN (acute kidney injury) ([facility] [N17.9]      Priority: Medium    Hypoglycemia [E16.2]      Priority: Medium    Hypothermia [T68. XXXA]      Priority: Medium    Hyponatremia [E87.1]      Priority: Medium    Pulmonary infiltrates [R91.8]      Priority: Medium    Shock circulatory ([facility] [R57.9]      Priority: Medium    Normocytic hypochromic anemia [D50.9]      Priority: Medium    Observed seizure-like activity (HCC) [R56.9]      Priority: Medium    Acute respiratory failure with hypoxia (HCC) [J96.01]      Priority: Medium    Paroxysmal atrial fibrillation (HCC) [I48.0]      Priority: Medium    Schizoaffective disorder (HCC) [F25.9]      Priority: Medium    Cardiomyopathy ([facility] [I42.9]        Right shoulder dislocation  Incidentally chest x-ray showed right shoulder dislocation. Confirmed by right shoulder x-ray. Orthopedic surgeon consulted. Acute hypoxemic respiratory failure due to pneumonia and acute on chronic systolic heart failure.: Patient required intubation and mechanical ventilation. required mechanical ventilator   S/p extubation on 10/20/22  Now on 2 L via NC.  IV Lasix ordered today  Continue speech and swallow therapy: Solid Consistency: advance to MINCED/MOIST  Liquid Consistency: advance to NECTAR THICK  Medication: crushed in puree as able versus via NG tube  PT OT consult     Pneumonia  Initially treated with IV cefepime and now on  IV ceftriaxone    Bradycardia  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 Cardiology on board. Abnormal LFT  improving    AUSTIN  Monitor GFR, avoid nephrotoxic agent     Hypothermia  resolved     Acute on chronic Systolic HF  New EF 83%  C/w current meds      Schizoaffective D/O  -  Continue home olanzapine 20mg nightly, trazodone 50mg nightly. DVT Prophylaxis: heparin   Diet: ADULT TUBE FEEDING; Nasogastric; Standard with Fiber; Continuous; 10; Yes; 10; Q 4 hours; 60; 60; Q 4 hours  ADULT DIET;  Dysphagia - Minced and Moist; Mildly Thick (Nectar)  Code Status: Full Code  PT/OT Eval Status: yes    Dispo -skilled nursing facility in 1 to 2 days        Cookie Schwab, MD

## 2022-10-23 NOTE — PROGRESS NOTES
INPATIENT PULMONARY CRITICAL CARE PROGRESS NOTE      Reason for visit    hypothermia, hypotensive and bradycardic status post urgent temporary transvenous pacemaker placement    SUBJECTIVE: Patient when seen this morning was doing well and not having increased shortness of breath/chest congestion patient was on 2 L of nasal cannula oxygen saturation of 95%, patient does not have any increasing oral secretions, patient does not have any increasing shortness of breath, patient tolerating  on enteral feeds via NG tube which is continuing when seen, patient was afebrile and had maintain hemodynamics, patient had sinus rhythm on the monitor, patient's blood sugars were better controlled , patient has had good urine output overnight with cumulative fluid balance of -705 ml, no other pertinent review of system of concern         Physical Exam:  Blood pressure 127/74, pulse 84, temperature 98.9 °F (37.2 °C), temperature source Oral, resp. rate 20, height 5' 8\" (1.727 m), weight 196 lb 1.6 oz (89 kg), SpO2 95 %.'     Constitutional:  No acute distress on nasal cannula oxygen  HENT: No facial asymmetry no thyromegaly. Eyes:  Conjunctivae are normal. Pupils equal, round, and reactive to light. No scleral icterus. Neck: . No tracheal deviation present. No obvious thyroid mass. Cardiovascular: Sinus rhythm normal heart sounds. No right ventricular heave. Bibasilar extremity edema. Pulmonary/Chest: No wheezes. decreased  rales. Chest wall is not dull to percussion. No accessory muscle usage or stridor. Abdominal: Soft. Bowel sounds present. No distinct tenderness now  Musculoskeletal: No cyanosis. No clubbing. No obvious joint deformity. Lymphadenopathy: No cervical or supraclavicular adenopathy. Skin: Skin is warm and dry.   Skin rash lower extremities  Neurologic alert and communicative when seen    Results:  CBC:   Recent Labs     10/21/22  0415 10/22/22  0431 10/23/22  0424   WBC 7.4 8.6 11.4*   HGB 9.4* 9.4* 9.8*   HCT 27.9* 28.6* 29.8*   MCV 88.5 88.8 89.1   * 142 178     BMP:   Recent Labs     10/21/22  0415 10/22/22  0431 10/23/22  0424   * 143 139   K 4.0 3.9  3.9 4.1   CL 98* 104 102   CO2 26 28 28   PHOS 3.7 3.9  --    BUN 29* 29* 22*   CREATININE 1.0 0.9 0.8     LIVER PROFILE:   Recent Labs     10/21/22  0415 10/22/22  0431 10/23/22  0424   AST 38* 55* 45*   ALT 41* 45* 40   BILITOT 0.5 0.4 0.3   ALKPHOS 130* 131* 119     PT/INR:   Recent Labs     10/21/22  0415 10/22/22  0431   PROTIME 14.3 15.0*   INR 1.12 1.18*         Imaging:  I have reviewed radiology images personally. XR SHOULDER RIGHT (MIN 2 VIEWS)   Final Result   Anterior, subcoracoid dislocation of the shoulder. Suspected multiple intra-articular calcifications. 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 XR CHEST PORTABLE   Final Result   1. Retraction of the endotracheal tube into high normal position. 2. No significant change in bibasilar airspace opacities worse on the right   due in part to passive atelectasis given at least trace bilateral pleural   effusions. Superimposed pneumonia and aspiration or aspiration is suspected   given comparison CT appearance. 3. Pulmonary vascular congestion and at least borderline cardiomegaly. 4. Incomplete evaluation of apparent right glenohumeral joint dislocation. 5. Age-indeterminate left humeral head fracture. XR ABDOMEN (KUB) (SINGLE AP VIEW)   Preliminary Result   Distal tip of nasogastric tube is visualized in the region of the proximal   aspect of the stomach as described above. XR ABDOMEN (KUB) (SINGLE AP VIEW)   Final Result   Nasogastric tube projects in normal, intragastric position. Right basilar consolidation. CT ABDOMEN PELVIS WO CONTRAST Additional Contrast? None   Final Result   Elevation of the right hemidiaphragm with right middle lobe and right lower   lobe consolidation/atelectasis. This could represent pneumonia.   Small right   pleural effusion. Small left pleural effusion with some compressive   atelectasis of the left lower lobe. Moderate cardiomegaly with coronary artery calcifications. Calcified gallstones in the gallbladder. The gallbladder is contracted with   apparent mural calcifications, in keeping with a porcelain gallbladder. This   is associated with increased incidence of gallbladder carcinoma. Surgical   consultation recommended. Moderate amount of fecal material in the left colon, with mild rectal fecal   impaction. Findings are in keeping with constipation. 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 XR CHEST PORTABLE   Final Result   Supportive tubing projects in normal position. Low lung volumes. Right basilar opacity, indeterminate for atelectasis or pneumonia/aspiration   pneumonitis. XR CHEST PORTABLE   Final Result   Endotracheal tube tip is 3 cm above the ade. Enteric tube tip is within   the stomach with side port just below the gastroesophageal junction. XR ABDOMEN (KUB) (SINGLE AP VIEW)   Final Result   Endotracheal tube tip is 3 cm above the ade. Enteric tube tip is within   the stomach with side port just below the gastroesophageal junction.              CULTURE, RESPIRATORY >100,000 CFU/mL Normal respiratory emmanuel Abnormal   [facility]   Gram Stain Result 4+ WBC's (Polymorphonuclear)   1+ WBC's (Mononuclear)   2+ Gram positive cocci   2+ Gram positive rods  [facility]   Organism Staphylococcus aureus Abnormal   [facility]   CULTURE, RESPIRATORY >100,000 CFU/mL  [address] [facility]        Susceptibility    Staphylococcus aureus (1)    Antibiotic Interpretation Microscan  Method Status    ceFAZolin Sensitive <=4 mcg/mL BACTERIAL SUSCEPTIBILITY PANEL BY MAGALIE     clindamycin Sensitive <=0.5 mcg/mL BACTERIAL SUSCEPTIBILITY PANEL BY MAGALIE     erythromycin Sensitive <=0.5 mcg/mL BACTERIAL SUSCEPTIBILITY PANEL BY MAGALIE     oxacillin Sensitive 0.5 mcg/mL BACTERIAL SUSCEPTIBILITY PANEL BY MAGALIE     tetracycline Sensitive <=4 mcg/mL BACTERIAL SUSCEPTIBILITY PANEL BY MAGALIE     trimethoprim-sulfamethoxazole Sensitive                     Proteus mirabilis (1)    Antibiotic Interpretation Microscan  Method Status    ampicillin Sensitive <=8 mcg/mL BACTERIAL SUSCEPTIBILITY PANEL BY MAGALIE     ampicillin-sulbactam Sensitive <=8/4 mcg/mL BACTERIAL SUSCEPTIBILITY PANEL BY MAGALIE     ceFAZolin Sensitive <=2 mcg/mL BACTERIAL SUSCEPTIBILITY PANEL BY MAGALIE     cefepime Sensitive <=2 mcg/mL BACTERIAL SUSCEPTIBILITY PANEL BY MAGALIE     cefTRIAXone Sensitive <=1 mcg/mL BACTERIAL SUSCEPTIBILITY PANEL BY MAGALIE     cefuroxime Sensitive <=4 mcg/mL BACTERIAL SUSCEPTIBILITY PANEL BY MAGALIE     ciprofloxacin Sensitive <=1 mcg/mL BACTERIAL SUSCEPTIBILITY PANEL BY MAGALIE     ertapenem Sensitive <=0.5 mcg/mL BACTERIAL SUSCEPTIBILITY PANEL BY MAGALIE     gentamicin Sensitive <=4 mcg/mL BACTERIAL SUSCEPTIBILITY PANEL BY MAGALIE     meropenem Sensitive <=1 mcg/mL BACTERIAL SUSCEPTIBILITY PANEL BY MAGALIE     nitrofurantoin Resistant >64 mcg/mL BACTERIAL SUSCEPTIBILITY PANEL BY MAGALIE     piperacillin-tazobactam Sensitive <=16 mcg/mL BACTERIAL SUSCEPTIBILITY PANEL BY MAGALIE     trimethoprim-sulfamethoxazole Sensitive            Assessment:  Principal Problem:    Symptomatic bradycardia  Active Problems:    Schizoaffective disorder (HCC)    Paroxysmal atrial fibrillation (HCC)    Complete heart block (HCC)    Systolic heart failure (HCC)    AUSTIN (acute kidney injury) (HCC)    Pulmonary infiltrates    Shock circulatory (HCC)    Normocytic hypochromic anemia    Observed seizure-like activity (HCC)    Acute respiratory failure with hypoxia (HCC)    Pyuria    Aspiration into airway    Proteus mirabilis infection    Staphylococcal pneumonia ([address]    Cardiomyopathy ([address]  Resolved Problems:    Hypotension    Hyperkalemia    Hypoglycemia    Hypothermia Hyponatremia          Plan:   Oxygen supplementation keep saturation between 90 and 94% only  Please titrate the oxygen as per the above parameters  Patient was on 2 L of nasal can oxygen with saturation of 95% when seen  Pulmonary toilet  Status post post therapeutic bronchoscopy  IV Rocephin to continue for 3 more days  Status post temporary transvenous pacemaker  Monitor cardiac rhythm and hemodynamics closely  Speech evaluation requested patient has been recommended n.p.o. status for now and enteral feeds has been started as per metabolic support  Speech reevaluation  Cardiac ardiac medications and Synthroid to continue  Zyprexa as per IM   Monitor I/O  and BMP  Correct electrolytes on whenever necessary basis  PUD and DVT prophylaxis  PT/OT evaluations      No other recommendations from Pulmonary/CCM stand point -will sign off -please call on PRN basis          Electronically signed by:  Adalberto Billings MD    10/23/2022    3:12 PM.

## 2022-10-24 LAB
A/G RATIO: 0.9 (ref 1.1–2.2)
ALBUMIN SERPL-MCNC: 2.8 G/DL (ref 3.4–5)
ALP BLD-CCNC: 102 U/L (ref 40–129)
ALT SERPL-CCNC: 36 U/L (ref 10–40)
ANION GAP SERPL CALCULATED.3IONS-SCNC: 7 MMOL/L (ref 3–16)
AST SERPL-CCNC: 43 U/L (ref 15–37)
BASOPHILS ABSOLUTE: 0 K/UL (ref 0–0.2)
BASOPHILS RELATIVE PERCENT: 0 %
BILIRUB SERPL-MCNC: 0.4 MG/DL (ref 0–1)
BUN BLDV-MCNC: 25 MG/DL (ref 7–20)
C-REACTIVE PROTEIN: 96.7 MG/L (ref 0–5.1)
CALCIUM SERPL-MCNC: 8.3 MG/DL (ref 8.3–10.6)
CHLORIDE BLD-SCNC: 102 MMOL/L (ref 99–110)
CO2: 30 MMOL/L (ref 21–32)
CREAT SERPL-MCNC: 0.9 MG/DL (ref 0.8–1.3)
EKG ATRIAL RATE: 87 BPM
EKG DIAGNOSIS: NORMAL
EKG P AXIS: 61 DEGREES
EKG P-R INTERVAL: 236 MS
EKG Q-T INTERVAL: 382 MS
EKG QRS DURATION: 138 MS
EKG QTC CALCULATION (BAZETT): 459 MS
EKG R AXIS: -27 DEGREES
EKG T AXIS: 65 DEGREES
EKG VENTRICULAR RATE: 87 BPM
EOSINOPHILS ABSOLUTE: 0.2 K/UL (ref 0–0.6)
EOSINOPHILS RELATIVE PERCENT: 2 %
GFR SERPL CREATININE-BSD FRML MDRD: >60 ML/MIN/{1.73_M2}
GLUCOSE BLD-MCNC: 105 MG/DL (ref 70–99)
GLUCOSE BLD-MCNC: 111 MG/DL (ref 70–99)
GLUCOSE BLD-MCNC: 112 MG/DL (ref 70–99)
GLUCOSE BLD-MCNC: 127 MG/DL (ref 70–99)
GLUCOSE BLD-MCNC: 141 MG/DL (ref 70–99)
GLUCOSE BLD-MCNC: 90 MG/DL (ref 70–99)
GLUCOSE BLD-MCNC: 91 MG/DL (ref 70–99)
HCT VFR BLD CALC: 27 % (ref 40.5–52.5)
HEMOGLOBIN: 8.6 G/DL (ref 13.5–17.5)
IRON SATURATION: 12 % (ref 20–50)
IRON: 29 UG/DL (ref 59–158)
LYMPHOCYTES ABSOLUTE: 0.8 K/UL (ref 1–5.1)
LYMPHOCYTES RELATIVE PERCENT: 7 %
MCH RBC QN AUTO: 28.7 PG (ref 26–34)
MCHC RBC AUTO-ENTMCNC: 32 G/DL (ref 31–36)
MCV RBC AUTO: 89.8 FL (ref 80–100)
MONOCYTES ABSOLUTE: 1.2 K/UL (ref 0–1.3)
MONOCYTES RELATIVE PERCENT: 10 %
NEUTROPHILS ABSOLUTE: 9.5 K/UL (ref 1.7–7.7)
NEUTROPHILS RELATIVE PERCENT: 81 %
PDW BLD-RTO: 16.2 % (ref 12.4–15.4)
PERFORMED ON: ABNORMAL
PERFORMED ON: NORMAL
PLATELET # BLD: 263 K/UL (ref 135–450)
PLATELET SLIDE REVIEW: ABNORMAL
PMV BLD AUTO: 8 FL (ref 5–10.5)
POTASSIUM SERPL-SCNC: 4.4 MMOL/L (ref 3.5–5.1)
RBC # BLD: 3.01 M/UL (ref 4.2–5.9)
RBC # BLD: NORMAL 10*6/UL
SLIDE REVIEW: ABNORMAL
SODIUM BLD-SCNC: 139 MMOL/L (ref 136–145)
TOTAL IRON BINDING CAPACITY: 242 UG/DL (ref 260–445)
TOTAL PROTEIN: 5.8 G/DL (ref 6.4–8.2)
TOXIC GRANULATION: PRESENT
WBC # BLD: 11.7 K/UL (ref 4–11)

## 2022-10-24 PROCEDURE — 97530 THERAPEUTIC ACTIVITIES: CPT

## 2022-10-24 PROCEDURE — 97110 THERAPEUTIC EXERCISES: CPT

## 2022-10-24 PROCEDURE — 36415 COLL VENOUS BLD VENIPUNCTURE: CPT

## 2022-10-24 PROCEDURE — C9113 INJ PANTOPRAZOLE SODIUM, VIA: HCPCS | Performed by: INTERNAL MEDICINE

## 2022-10-24 PROCEDURE — 99232 SBSQ HOSP IP/OBS MODERATE 35: CPT

## 2022-10-24 PROCEDURE — 93005 ELECTROCARDIOGRAM TRACING: CPT | Performed by: INTERNAL MEDICINE

## 2022-10-24 PROCEDURE — 93010 ELECTROCARDIOGRAM REPORT: CPT | Performed by: INTERNAL MEDICINE

## 2022-10-24 PROCEDURE — 6370000000 HC RX 637 (ALT 250 FOR IP): Performed by: INTERNAL MEDICINE

## 2022-10-24 PROCEDURE — 94761 N-INVAS EAR/PLS OXIMETRY MLT: CPT

## 2022-10-24 PROCEDURE — 2700000000 HC OXYGEN THERAPY PER DAY

## 2022-10-24 PROCEDURE — 86140 C-REACTIVE PROTEIN: CPT

## 2022-10-24 PROCEDURE — 80053 COMPREHEN METABOLIC PANEL: CPT

## 2022-10-24 PROCEDURE — 97535 SELF CARE MNGMENT TRAINING: CPT

## 2022-10-24 PROCEDURE — 83540 ASSAY OF IRON: CPT

## 2022-10-24 PROCEDURE — 83550 IRON BINDING TEST: CPT

## 2022-10-24 PROCEDURE — 6360000002 HC RX W HCPCS: Performed by: INTERNAL MEDICINE

## 2022-10-24 PROCEDURE — 2060000000 HC ICU INTERMEDIATE R&B

## 2022-10-24 PROCEDURE — 85025 COMPLETE CBC W/AUTO DIFF WBC: CPT

## 2022-10-24 RX ADMIN — ACETAMINOPHEN 650 MG: 650 SUPPOSITORY RECTAL at 21:08

## 2022-10-24 RX ADMIN — ASPIRIN 81 MG 81 MG: 81 TABLET ORAL at 08:57

## 2022-10-24 RX ADMIN — HEPARIN SODIUM 5000 UNITS: 5000 INJECTION INTRAVENOUS; SUBCUTANEOUS at 06:27

## 2022-10-24 RX ADMIN — DOCUSATE SODIUM 100 MG: 50 LIQUID ORAL at 08:57

## 2022-10-24 RX ADMIN — HEPARIN SODIUM 5000 UNITS: 5000 INJECTION INTRAVENOUS; SUBCUTANEOUS at 22:28

## 2022-10-24 RX ADMIN — METOPROLOL TARTRATE 12.5 MG: 25 TABLET, FILM COATED ORAL at 08:58

## 2022-10-24 RX ADMIN — PANTOPRAZOLE SODIUM 40 MG: 40 INJECTION, POWDER, FOR SOLUTION INTRAVENOUS at 08:58

## 2022-10-24 RX ADMIN — LEVOTHYROXINE SODIUM 125 MCG: 0.12 TABLET ORAL at 09:01

## 2022-10-24 RX ADMIN — HEPARIN SODIUM 5000 UNITS: 5000 INJECTION INTRAVENOUS; SUBCUTANEOUS at 13:47

## 2022-10-24 ASSESSMENT — PAIN SCALES - GENERAL: PAINLEVEL_OUTOF10: 0

## 2022-10-24 NOTE — PLAN OF CARE
VSS. Patient is oriented x2. Anxious and confused at times. Patient complained of chest pain this AM, EKG complete and MD aware, no further orders. Consult placed to psych today. Peetrson removed and external catheter placed. Patient tolerating PO intake, tube feed stopped and possible NGT removal tomorrow. Afebrile. Turned q 2 hr for pressure ulcer prevention. Avaysis in place for patient safety and redirection. Patient and patient's family, at bedside, updated on plan of care. Will continue plan of care.      Problem: Chronic Conditions and Co-morbidities  Goal: Patient's chronic conditions and co-morbidity symptoms are monitored and maintained or improved  Outcome: Progressing  Flowsheets (Taken 10/24/2022 0845)  Care Plan - Patient's Chronic Conditions and Co-Morbidity Symptoms are Monitored and Maintained or Improved:   Monitor and assess patient's chronic conditions and comorbid symptoms for stability, deterioration, or improvement   Collaborate with multidisciplinary team to address chronic and comorbid conditions and prevent exacerbation or deterioration     Problem: Discharge Planning  Goal: Discharge to home or other facility with appropriate resources  Outcome: Progressing  Flowsheets (Taken 10/24/2022 0845)  Discharge to home or other facility with appropriate resources:   Identify barriers to discharge with patient and caregiver   Arrange for needed discharge resources and transportation as appropriate   Identify discharge learning needs (meds, wound care, etc)     Problem: Pain  Goal: Verbalizes/displays adequate comfort level or baseline comfort level  Outcome: Progressing     Problem: Neurosensory - Adult  Goal: Achieves stable or improved neurological status  Outcome: Progressing  Flowsheets (Taken 10/24/2022 0845)  Achieves stable or improved neurological status:   Initiate measures to prevent increased intracranial pressure   Assess for and report changes in neurological status  Goal: Absence of seizures  Outcome: Progressing  Goal: Remains free of injury related to seizures activity  Outcome: Progressing  Flowsheets (Taken 10/24/2022 0845)  Remains free of injury related to seizure activity: Maintain airway, patient safety  and administer oxygen as ordered  Goal: Achieves maximal functionality and self care  Outcome: Progressing  Flowsheets (Taken 10/24/2022 0845)  Achieves maximal functionality and self care: Monitor swallowing and airway patency with patient fatigue and changes in neurological status     Problem: Respiratory - Adult  Goal: Achieves optimal ventilation and oxygenation  Outcome: Progressing     Problem: Cardiovascular - Adult  Goal: Maintains optimal cardiac output and hemodynamic stability  Outcome: Progressing  Flowsheets (Taken 10/24/2022 0845)  Maintains optimal cardiac output and hemodynamic stability: Monitor blood pressure and heart rate  Goal: Absence of cardiac dysrhythmias or at baseline  Outcome: Progressing  Flowsheets (Taken 10/24/2022 0845)  Absence of cardiac dysrhythmias or at baseline: Monitor cardiac rate and rhythm     Problem: Skin/Tissue Integrity - Adult  Goal: Skin integrity remains intact  Outcome: Progressing  Flowsheets (Taken 10/24/2022 0845)  Skin Integrity Remains Intact: Monitor for areas of redness and/or skin breakdown  Goal: Incisions, wounds, or drain sites healing without S/S of infection  Outcome: Progressing  Flowsheets (Taken 10/24/2022 0845)  Incisions, Wounds, or Drain Sites Healing Without Sign and Symptoms of Infection: ADMISSION and DAILY: Assess and document risk factors for pressure ulcer development  Goal: Oral mucous membranes remain intact  Outcome: Progressing  Flowsheets (Taken 10/24/2022 0845)  Oral Mucous Membranes Remain Intact: Assess oral mucosa and hygiene practices     Problem: Musculoskeletal - Adult  Goal: Return mobility to safest level of function  Outcome: Progressing  Flowsheets (Taken 10/24/2022 0845)  Return Mobility to Safest Level of Function: Assess patient stability and activity tolerance for standing, transferring and ambulating with or without assistive devices  Goal: Maintain proper alignment of affected body part  Outcome: Progressing  Flowsheets (Taken 10/24/2022 0845)  Maintain proper alignment of affected body part: Support and protect limb and body alignment per provider's orders  Goal: Return ADL status to a safe level of function  Outcome: Progressing  Flowsheets (Taken 10/24/2022 0845)  Return ADL Status to a Safe Level of Function: Administer medication as ordered     Problem: Genitourinary - Adult  Goal: Absence of urinary retention  Outcome: Progressing  Flowsheets (Taken 10/24/2022 0845)  Absence of urinary retention: Assess patients ability to void and empty bladder  Goal: Urinary catheter remains patent  Outcome: Progressing  Flowsheets (Taken 10/24/2022 0845)  Urinary catheter remains patent: Assess patency of urinary catheter     Problem: Hematologic - Adult  Goal: Maintains hematologic stability  Outcome: Progressing  Flowsheets (Taken 10/24/2022 0845)  Maintains hematologic stability: Assess for signs and symptoms of bleeding or hemorrhage     Problem: Nutrition Deficit:  Goal: Optimize nutritional status  Outcome: Progressing     Problem: Skin/Tissue Integrity  Goal: Absence of new skin breakdown  Description: 1. Monitor for areas of redness and/or skin breakdown  2. Assess vascular access sites hourly  3. Every 4-6 hours minimum:  Change oxygen saturation probe site  4. Every 4-6 hours:  If on nasal continuous positive airway pressure, respiratory therapy assess nares and determine need for appliance change or resting period. Outcome: Progressing     Problem: Safety - Adult  Goal: Free from fall injury  Outcome: Progressing     Problem: Confusion  Goal: Confusion, delirium, dementia, or psychosis is improved or at baseline  Description: INTERVENTIONS:  1.  Assess for possible contributors to thought disturbance, including medications, impaired vision or hearing, underlying metabolic abnormalities, dehydration, psychiatric diagnoses, and notify attending LIP  2. Daniels high risk fall precautions, as indicated  3. Provide frequent short contacts to provide reality reorientation, refocusing and direction  4. Decrease environmental stimuli, including noise as appropriate  5. Monitor and intervene to maintain adequate nutrition, hydration, elimination, sleep and activity  6. If unable to ensure safety without constant attention obtain sitter and review sitter guidelines with assigned personnel  7.  Initiate Psychosocial CNS and Spiritual Care consult, as indicated  Outcome: Progressing

## 2022-10-24 NOTE — CARE COORDINATION
Case Management Follow up      Chart reviewed and case discussed during huddle and rounds. Pt is admitted day # 9. Unit C-4. Diagnosis and currently status as per MD progress: Bradycardia- CHB, s/p temp pacer. Extubated and downgraded to PCU now. Services following: IM, Pulm, Ortho    Anticipated Discharge date:pending progress    Expected Plan for Discharge:LTACH placement. Select specialty able to accept, Pt still meets criteria. Pt needs approval through [place]. Call placed to Yared Boles @ [phone] and message left for Provider to call back. Pre cert needed: yes, through [place]. Message left for call back. Potential Barriers:none    Pt;s ALINA Llamas, requesting Psych consult to manage Psych meds as she feels that the series of events contributing to current situation were triggered by Seroquel. Message to provider, Rebeca Welsh. RN CM will continue to follow Pt clinical course for DCP needs.

## 2022-10-24 NOTE — PROGRESS NOTES
Hospitalist Progress Note      PCP: [facility]    Date of Admission: 10/15/2022    Chief Complaint: respiratory failure s/p intubation. HPI   78 y.o. male. Patient has a h/o schizoaffective disorder. He lives in [place], [place]. He receives most of his medical care through the [place] in [place]. He was initially admitted to [facility] [address] [address] [place] [address] in [place], [place] for hallucinations and impulsive behavior. He was there for ten days then transferred to the [facility] geriatric inpatient psychiatry unit on 10/11. Around 9pm on 10/15 a code blue was called because the patient briefly went unresponsive. He had been bradycardic all day  He had been taking metoprolol chronically but this had been held throughout the day because of his bradycardia. His nurse was trying to check his vitals again as it appeared he was breathing abnormally. While vital signs were being checked the patient lost consciousness briefly (less than a minute). He awoke but was markedly bradycardic. He was sent to the ER where he was found to be hypothermic, hypotensive, and bradycardic to the 20s in complete heart block. He was transferred to [facility] for urgent transvenous pacing lead placement. Patient had a h/o chronic systolic HF, thought to be nonischemic, and paroxysmal afib. The patient required sedation during temporary pacing lead placement and then became agitated thereafter. He required restraints and was given IM geodon, so was not meaningfully interactive or able to provide any history. According to Dr. Zenaida Harrell notes the patient had already been difficult to understand at baseline. Subjective:  Patient resting in bed, no voiced complaints, poor historian.        Medications:  Reviewed    Infusion Medications    dextrose       Scheduled Medications    OLANZapine zydis  20 mg Oral Nightly    metoprolol tartrate  12.5 mg Oral BID    docusate  100 mg Oral Daily levothyroxine  125 mcg Oral Daily    atorvastatin  20 mg Oral Nightly    heparin (porcine)  5,000 Units SubCUTAneous 3 times per day    pantoprazole  40 mg IntraVENous Daily    aspirin  81 mg Oral Daily     PRN Meds: glucose, dextrose bolus **OR** dextrose bolus, glucagon (rDNA), dextrose, magnesium sulfate, potassium chloride **OR** potassium chloride, ondansetron, acetaminophen **OR** acetaminophen, ziprasidone, melatonin, perflutren lipid microspheres      Intake/Output Summary (Last 24 hours) at 10/24/2022 1619  Last data filed at 10/24/2022 1506  Gross per 24 hour   Intake 669 ml   Output 2500 ml   Net -1831 ml         Physical Exam Performed:    /63   Pulse 84   Temp 98.5 °F (36.9 °C)   Resp 18   Ht 5' 8\" (1.727 m)   Wt 198 lb 9.6 oz (90.1 kg)   SpO2 96%   BMI 30.20 kg/m²     General appearance: Alert, gen weak, NAD  HEENT: Pupils equal, round, and reactive to light. Conjunctivae/corneas clear. Neck: Supple, with full range of motion. No jugular venous distention. Trachea midline. Respiratory:  Normal respiratory effort. Diminished BS  Cardiovascular: Regular rate and rhythm with normal S1/S2 without murmurs, rubs or gallops. Abdomen: Soft, non-tender, non-distended with normal bowel sounds. Musculoskeletal: left hand edema  Skin: Skin color-pale  Neurologic:  Neurovascularly intact without any focal sensory/motor deficits.  Cranial nerves: II-XII intact, grossly non-focal.  Psychiatric: Alert, limited insight  Capillary Refill: Brisk, 3 seconds, normal   Peripheral Pulses: +2 palpable, equal bilaterally       Labs:   Recent Labs     10/22/22  0431 10/23/22  0424 10/24/22  0451   WBC 8.6 11.4* 11.7*   HGB 9.4* 9.8* 8.6*   HCT 28.6* 29.8* 27.0*    178 263       Recent Labs     10/22/22  0431 10/23/22  0424 10/24/22  0451    139 139   K 3.9  3.9 4.1 4.4    102 102   CO2 28 28 30   BUN 29* 22* 25*   CREATININE 0.9 0.8 0.9   CALCIUM 9.1 8.6 8.3   PHOS 3.9  --   --        Recent Labs     10/22/22  0431 10/23/22  0424 10/24/22  0451   AST 55* 45* 43*   ALT 45* 40 36   BILITOT 0.4 0.3 0.4   ALKPHOS 131* 119 102       Recent Labs     10/22/22  0431   INR 1.18*       No results for input(s): Laly Odom in the last 72 hours. Urinalysis:      Lab Results   Component Value Date/Time    NITRU POSITIVE 10/17/2022 04:15 AM    WBCUA 10-20 10/17/2022 04:15 AM    BACTERIA 2+ 10/17/2022 04:15 AM    RBCUA 21-50 10/17/2022 04:15 AM    BLOODU LARGE 10/17/2022 04:15 AM    SPECGRAV 1.010 10/17/2022 04:15 AM    GLUCOSEU Negative 10/17/2022 04:15 AM       Radiology:  XR SHOULDER RIGHT (MIN 2 VIEWS)   Final Result   Anterior, subcoracoid dislocation of the shoulder. Suspected multiple intra-articular calcifications. 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 XR CHEST PORTABLE   Final Result   1. Retraction of the endotracheal tube into high normal position. 2. No significant change in bibasilar airspace opacities worse on the right   due in part to passive atelectasis given at least trace bilateral pleural   effusions. Superimposed pneumonia and aspiration or aspiration is suspected   given comparison CT appearance. 3. Pulmonary vascular congestion and at least borderline cardiomegaly. 4. Incomplete evaluation of apparent right glenohumeral joint dislocation. 5. Age-indeterminate left humeral head fracture. XR ABDOMEN (KUB) (SINGLE AP VIEW)   Final Result   Distal tip of nasogastric tube is visualized in the region of the proximal   aspect of the stomach as described above. XR ABDOMEN (KUB) (SINGLE AP VIEW)   Final Result   Nasogastric tube projects in normal, intragastric position. Right basilar consolidation. CT ABDOMEN PELVIS WO CONTRAST Additional Contrast? None   Final Result   Elevation of the right hemidiaphragm with right middle lobe and right lower   lobe consolidation/atelectasis. This could represent pneumonia.   Small right   pleural effusion. Small left pleural effusion with some compressive   atelectasis of the left lower lobe. Moderate cardiomegaly with coronary artery calcifications. Calcified gallstones in the gallbladder. The gallbladder is contracted with   apparent mural calcifications, in keeping with a porcelain gallbladder. This   is associated with increased incidence of gallbladder carcinoma. Surgical   consultation recommended. Moderate amount of fecal material in the left colon, with mild rectal fecal   impaction. Findings are in keeping with constipation. 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 XR CHEST PORTABLE   Final Result   Supportive tubing projects in normal position. Low lung volumes. Right basilar opacity, indeterminate for atelectasis or pneumonia/aspiration   pneumonitis. XR CHEST PORTABLE   Final Result   Endotracheal tube tip is 3 cm above the ade. Enteric tube tip is within   the stomach with side port just below the gastroesophageal junction. XR ABDOMEN (KUB) (SINGLE AP VIEW)   Final Result   Endotracheal tube tip is 3 cm above the ade. Enteric tube tip is within   the stomach with side port just below the gastroesophageal junction.                  Assessment/Plan:    Active Hospital Problems    Diagnosis     Closed traumatic subcoracoid dislocation of right shoulder joint [S43.014A]      Priority: Medium    Proteus mirabilis infection [A49.8]      Priority: Medium    Staphylococcal pneumonia ([address] [J15.20]      Priority: Medium    Pyuria [R82.81]      Priority: Medium    Aspiration into airway [T17.908A]      Priority: Medium    Complete heart block ([address] [I44.2]      Priority: Medium    Systolic heart failure (HCC) [I50.20]      Priority: Medium    Symptomatic bradycardia [R00.1]      Priority: Medium    AUSTIN (acute kidney injury) ([address] [N17.9]      Priority: Medium    Pulmonary infiltrates [R91.8]      Priority: Medium Shock circulatory ([facility] [R57.9]      Priority: Medium    Normocytic hypochromic anemia [D50.9]      Priority: Medium    Observed seizure-like activity (HCC) [R56.9]      Priority: Medium    Acute respiratory failure with hypoxia (HCC) [J96.01]      Priority: Medium    Paroxysmal atrial fibrillation (HCC) [I48.0]      Priority: Medium    Schizoaffective disorder ([facility] [F25.9]      Priority: Medium    Cardiomyopathy ([facility] [I42.9]        Right shoulder dislocation  Incidentally chest x-ray showed right shoulder dislocation. Confirmed by right shoulder x-ray. Orthopedic surgeon consulted    Acute hypoxemic respiratory failure due to pneumonia and acute on chronic systolic heart failure: Patient required intubation and mechanical ventilation. required mechanical ventilator   S/p extubation on 10/20/22  Now on 2 L via NC. Continue speech and swallow therapy: Solid Consistency: advance to MINCED/MOIST  Liquid Consistency: advance to NECTAR THICK  Medication: crushed in puree as able versus via NG tube  PT OT consult   Hold TF, tolerating diet  Resp cx:staph aureus    Pneumonia  Initially treated with IV cefepime and now on  IV ceftriaxone    Bradycardia, Complete heart block with junctional escape on admission s/p temporary venous pacemaker   Off dopamine drip. Cardiology on board. Abnormal LFT  improving    AUSTIN  Monitor GFR, avoid nephrotoxic agent, dc glynn    Hypothermia  Resolved    UTI-proteus mirabilis, completed Rocephin     Acute on chronic Systolic HF  -  TTE 3/1/10:  LVEF = 40-45%, New EF 35%  C/w current meds, I&O, HF orderset, BNP 5,077     Schizoaffective D/O  -  Continue home olanzapine 20mg nightly, trazodone 50mg nightly. Psych consulted     DVT Prophylaxis: heparin   Diet: ADULT TUBE FEEDING; Nasogastric; Standard with Fiber; Continuous; 10; Yes; 10; Q 4 hours; 60; 60; Q 4 hours  ADULT DIET;  Dysphagia - Minced and Moist; Mildly Thick (Nectar)  Code Status: Full Code  PT/OT Eval Status: yes    Dispo -skilled nursing facility in 1 to 2 days        Louise Bashir, APRN - CNP

## 2022-10-24 NOTE — PROGRESS NOTES
[address]     Electrophysiology                                     Progress Note    Admission date:  10/15/2022    Reason for follow up visit: CHB/AF    HPI/CC: Carmelo Parrish was admitted on 10/15/2022 from [facility]. He was a patient on the inpatient psychiatry unit. He was found to be hypothermic and bradycardic. EKG showed CHB with junctional escape rhythm. He was transferred to [facility] and had a temporary venous pacemaker placed. Dopamine was started. Dopamine has since been discontinued. On 10/20/2022 he was extubated and temporary pacing wire was pulled. On 10/21/2022 low dose metoprolol was started. Rhythm has been SR with PVC's, heart rates in the 80's. Subjective: He awakes to stimuli but falls asleep quickly, he is not answering questions     Vitals:  Blood pressure 110/63, pulse 84, temperature 98.5 °F (36.9 °C), resp. rate 18, height 5' 8\" (1.727 m), weight 198 lb 9.6 oz (90.1 kg), SpO2 96 %.   Temp  Av.7 °F (37.1 °C)  Min: 98.1 °F (36.7 °C)  Max: 99.2 °F (37.3 °C)  Pulse  Av.3  Min: 84  Max: 101  BP  Min: 110/63  Max: 119/61  SpO2  Av.3 %  Min: 92 %  Max: 96 %    24 hour I/O    Intake/Output Summary (Last 24 hours) at 10/24/2022 1421  Last data filed at 10/24/2022 1244  Gross per 24 hour   Intake 771 ml   Output 2250 ml   Net -1479 ml     Current Facility-Administered Medications   Medication Dose Route Frequency Provider Last Rate Last Admin    glucose chewable tablet 16 g  4 tablet Oral PRN Irina Deluca MD        dextrose bolus 10% 125 mL  125 mL IntraVENous PRN Irina Deluca MD   Stopped at 10/21/22 1709    Or    dextrose bolus 10% 250 mL  250 mL IntraVENous PRN Irina Deluca MD        glucagon (rDNA) injection 1 mg  1 mg SubCUTAneous PRN Irina Deluca MD        dextrose 10 % infusion   IntraVENous Continuous PRN Irina Deluca MD        OLANZapine zydis (ZYPREXA) disintegrating tablet 20 mg  20 mg Oral Nightly Irina Deluca MD   20 mg at 10/23/22 2201    magnesium sulfate 1000 mg in dextrose 5% 100 mL IVPB  1,000 mg IntraVENous PRN Iris Castillo MD        metoprolol tartrate (LOPRESSOR) tablet 12.5 mg  12.5 mg Oral BID Luigi Hdz MD   12.5 mg at 10/24/22 0858    docusate (COLACE) 50 MG/5ML liquid 100 mg  100 mg Oral Daily Luigi Hdz MD   100 mg at 10/24/22 0857    potassium chloride 20 mEq/50 mL IVPB (Central Line)  20 mEq IntraVENous PRN Wil Weiner MD        Or    potassium chloride 10 mEq/100 mL IVPB (Peripheral Line)  10 mEq IntraVENous PRN Wil Weiner MD        [facility]etron [facility]) injection 4 mg  4 mg IntraVENous Q6H PRN Wil Weiner MD        acetaminophen (TYLENOL) tablet 650 mg  650 mg Oral Q4H PRN Wil Weiner MD   650 mg at 10/22/22 9680    Or    acetaminophen (TYLENOL) suppository 650 mg  650 mg Rectal Q4H PRN Wil Weiner MD        ziprasidone (GEODON) injection 10 mg  10 mg IntraMUSCular Q12H PRN Wil Weiner MD   10 mg at 10/23/22 1334    levothyroxine (SYNTHROID) tablet 125 mcg  125 mcg Oral Daily Wil Weiner MD   125 mcg at 10/24/22 0901    melatonin tablet 3 mg  3 mg Oral Nightly PRN Wil Weiner MD   3 mg at 10/21/22 2037    atorvastatin (LIPITOR) tablet 20 mg  20 mg Oral Nightly Wil Weiner MD   20 mg at 10/23/22 2158    heparin (porcine) injection 5,000 Units  5,000 Units SubCUTAneous 3 times per day Iris Castillo MD   5,000 Units at 10/24/22 1347    pantoprazole (PROTONIX) injection 40 mg  40 mg IntraVENous Daily Iris Castillo MD   40 mg at 10/24/22 0206    aspirin chewable tablet 81 mg  81 mg Oral Daily Tariq Haddox, DO   81 mg at 10/24/22 0857    perflutren lipid microspheres (DEFINITY) injection 1.65 mg  1.5 mL IntraVENous ONCE PRN Tariq Haddox, DO           Objective:     Telemetry monitor: SR with PVC's      Physical Exam:  Constitutional and general appearance: alert, cooperative, no distress, and appears stated age  [de-identified]: PERRL, no cervical lymphadenopathy. No masses palpable.  Normal oral mucosa  Respiratory:  Normal excursion and expansion without use of accessory muscles  Resp auscultation: +rhonchi  Cardiovascular: The apical impulse is not displaced  Heart tones are crisp and normal. regular S1 and S2.  Jugular venous pulsation Normal  The carotid upstroke is normal in amplitude and contour without delay or bruit  Peripheral pulses are symmetrical and full   Abdomen:  No masses or tenderness  Bowel sounds present  Extremities:   No cyanosis or clubbing   Yes, +1 pitting left ankle, none in RLE   Skin: warm and dry  Neurological:  Alert and oriented  Moves all extremities well  No abnormalities of mood, affect, memory, mentation, or behavior are noted    Data  Echo 10/17/2022:   Summary   Technically difficult study due to poor acoustic window and patient on vent. Difficult to assess left ventricular systolic function due to arrhythmia but   appears moderately reduced with an ejection fraction of 35% (better assessed   on definity images, however ectopy makes assessment of lv function   difficult)   Normal left ventricular size with mild concentric left ventricular   hypertrophy. Left ventricular diastolic filling pressure is normal     All labs and testing reviewed.   Lab Review     Renal Profile:   Lab Results   Component Value Date/Time    CREATININE 0.9 10/24/2022 04:51 AM    BUN 25 10/24/2022 04:51 AM     10/24/2022 04:51 AM    K 4.4 10/24/2022 04:51 AM    K 3.9 10/22/2022 04:31 AM     10/24/2022 04:51 AM    CO2 30 10/24/2022 04:51 AM     CBC:    Lab Results   Component Value Date/Time    WBC 11.7 10/24/2022 04:51 AM    RBC 3.01 10/24/2022 04:51 AM    HGB 8.6 10/24/2022 04:51 AM    HCT 27.0 10/24/2022 04:51 AM    MCV 89.8 10/24/2022 04:51 AM    RDW 16.2 10/24/2022 04:51 AM     10/24/2022 04:51 AM     BNP:  No results found for: BNP  Fasting Lipid Panel:    Lab Results   Component Value Date/Time    CHOL 109 10/11/2022 03:56 PM    HDL 61 10/11/2022 03:56 PM    TRIG 56 10/11/2022 03:56 PM     Cardiac Enzymes:  CK/MbTroponin  Lab Results   Component Value Date/Time    TROPONINI <0.01 10/15/2022 10:00 PM     PT/ INR   Lab Results   Component Value Date/Time    INR 1.18 10/22/2022 04:31 AM    INR 1.12 10/21/2022 04:15 AM    INR 1.11 10/20/2022 04:21 AM    PROTIME 15.0 10/22/2022 04:31 AM    PROTIME 14.3 10/21/2022 04:15 AM    PROTIME 14.2 10/20/2022 04:21 AM     PTT No results found for: PTT   Lab Results   Component Value Date/Time    MG 2.00 10/22/2022 04:31 AM      Lab Results   Component Value Date/Time    TSH 1.82 10/11/2022 03:56 PM     Assessment:  Complete heart block with junctional escape: resolved   -s/p temporary venous pacemaker placement at admission   -temporary wire pulled 10/20/2022  Paroxysmal atrial fibrillation: stable   -[id number] score 4 (age, CHF, HTN)  -not previously anticoagulated due to falls  First degree AV block: stable  PAC's/PVC's: stable  LBBB  Chronic systolic CHF: compensated   Nonischemic cardiomyopathy: ongoing  -EF 35% on 10/2022  -has declined ICD in past  HTN  Acute respiratory failure requiring intubation  -extubated 10/20/2022   Hyperkalemia: resolved  Hyponatremia: resolved  Hypoglycemia: resolved  AUSTIN/CKD: resolved  BPH  Anemia  Hypotension  Hypothermia  Schizophrenia         Plan:   1. Continue Lopressor 12.5 mg PO daily  2. Will talk with his sister about starting anticoagulation for stroke risk given [id number] score of 4 and PAF  3.  Will plan of cardiac event monitor at discharge        CHRISTIAN Wilcox-CNP  [facility]  [phone]

## 2022-10-24 NOTE — PROGRESS NOTES
Physical Therapy  [address]  Daily Treatment Note  NAME: Sindy Martinez  : 1945  MRN: [id number]    Date of Service: 10/24/2022    Discharge Recommendations:  Subacute/Skilled Nursing Facility   PT Equipment Recommendations  Equipment Needed: No    [facility] 6 Clicks Inpatient Mobility:  AM-PAC Mobility Inpatient   How much difficulty turning over in bed?: A Lot  How much difficulty sitting down on / standing up from a chair with arms?: Unable  How much difficulty moving from lying on back to sitting on side of bed?: A Lot  How much help from another person moving to and from a bed to a chair?: Total  How much help from another person needed to walk in hospital room?: Total  How much help from another person for climbing 3-5 steps with a railing?: Total  AM-PAC Inpatient Mobility Raw Score : 8  AM-PAC Inpatient T-Scale Score : 28.52  Mobility Inpatient CMS 0-100% Score: 86.62  Mobility Inpatient CMS G-Code Modifier : CM    Patient Diagnosis(es): The encounter diagnosis was Bradycardia. Assessment   Assessment: Pt continues to require max A x2 for rolling and supine<>sit. Attempted sit-stand from EOB with max A x2, however pt only able to stand ~25% of a full stand. Will continue to progress mobility as pt tolerates. Recommend SNF for continued therapy. Activity Tolerance: Patient tolerated treatment well  Equipment Needed: No     Plan    Physcial Therapy Plan  General Plan: 3-5 times per week  Current Treatment Recommendations: Strengthening;ROM;Balance training;Functional mobility training;Transfer training;ADL/Self-care training;Cognitive/Perceptual training; Endurance training;Gait training;Stair training;Neuromuscular re-education;Patient/Caregiver education & training; Safety education & training;Pain management;Positioning; Therapeutic activities     Restrictions  Restrictions/Precautions  Restrictions/Precautions: Fall Risk, Up as Tolerated, Bed Alarm  Required Braces or Orthoses?: No     Subjective    Subjective  Subjective: Pt agreeable to therapy. Difficult to understand speech at times  Pain: no direct c/o pain     Objective   Vitals  Comment: /67, HR 83, SpO2 96%  Bed Mobility Training  Bed Mobility Training: Yes  Overall Level of Assistance: Assist X2;Maximum assistance  Rolling: Maximum assistance;Assist X2 (rolling multiple times each direction for bed pan placement and pericare)  Supine to Sit: Assist X2;Maximum assistance  Sit to Supine: Assist X2;Maximum assistance  Scooting: Assist X2;Maximum assistance  Balance  Sitting: Impaired  Sitting - Static: Poor (constant support)  Sitting - Dynamic: Poor (constant support)  Transfer Training  Transfer Training: No (attempted sit-stand from EOB with max Ax2 however, pt able to stand ~25% of a full stand)     PT Exercises  Dynamic Sitting Balance Exercises: Sitting EOB x15 minutes with varied assist levels of max to min A. Pt demonstrated L lateral lean, able to correct with min A and cues. Safety Devices  Type of Devices: All jv prominences offloaded;Call light within reach;Gait belt;Left in bed;Patient at risk for falls; Bed alarm in place;Nurse notified       Goals  Short Term Goals  Time Frame for Short Term Goals: 7 days (10/28/22)  Short Term Goal 1: Pt will be able to conduct bed mobility exercise with min-A of x2  Short Term Goal 2: Pt will be able to perform 10x BLE exercises with Min-A x1  Short Term Goal 3: Pt will tolerate sitting EOB x10 minutes with Supervision  Short Term Goal 4: Pt will transfer from sitting at edge of bed to chair min-A x2 with RW by 10/24/22  Patient Goals   Patient Goals : none stated by pt    Education  Patient Education  Education Given To: Patient  Education Provided: Role of Therapy;Plan of Care;Transfer Training;Orientation  Education Method: Demonstration;Verbal  Barriers to Learning: Cognition;Vision  Education Outcome: Verbalized understanding    Therapy Time   Individual Concurrent Group Co-treatment   Time In 4709         Time Out 1210         Minutes 47         Timed Code Treatment Minutes: [address] [place], [id number]  [facility]

## 2022-10-24 NOTE — PROGRESS NOTES
Occupational Therapy  Facility/Department: [facility]  Daily Treatment Note  NAME: Rachele Miller  : 1945  MRN: [id number]    Date of Service: 10/24/2022    Discharge Recommendations:  [address]         Patient Diagnosis(es): The encounter diagnosis was Bradycardia. Assessment    Assessment: Pt continues to require max A x2 for rolling and supine<>sit. Attempted sit-stand from EOB with max A x2 however unable to clear buttocks. Mod A provided for EOB grooming tasks. Recommend SNF for continued therapy. Activity Tolerance: Patient tolerated treatment well;Treatment limited secondary to decreased cognition  Discharge Recommendations: Subacute/Skilled Nursing Facility      Plan   Occupational Therapy Plan  Times Per Week: 3-5  Current Treatment Recommendations: ROM;Balance training;Functional mobility training; Endurance training;Pain management; Safety education & training;Patient/Caregiver education & training;Equipment evaluation, education, & procurement;Positioning;Self-Care / ADL; Coordination training     Restrictions  Restrictions/Precautions  Restrictions/Precautions: Fall Risk;Up as Tolerated; Bed Alarm  Required Braces or Orthoses?: No    Subjective   Subjective  Subjective: Pt seen for OT/PT cotx. Pt agreeable to therapy session. RN cleared pt  Pain: Pt denies pain  Orientation  Overall Orientation Status: Impaired  Orientation Level: Oriented to place;Oriented to person;Disoriented to time;Disoriented to situation  Pain: no direct c/o pain  Cognition  Overall Cognitive Status: Exceptions  Arousal/Alertness: Delayed responses to stimuli  Following Commands:  Follows one step commands with increased time  Attention Span: Difficulty dividing attention  Safety Judgement: Decreased awareness of need for assistance;Decreased awareness of need for safety  Problem Solving: Assistance required to correct errors made  Insights: Decreased awareness of deficits  Initiation: Requires cues for all  Sequencing: Requires cues for all        Objective    Vitals:       BP: (!) 106/59   Pulse: 83   Resp: 16   Temp: 99.2 °F (37.3 °C)   SpO2: 96%       Bed Mobility Training  Bed Mobility Training: Yes  Overall Level of Assistance: Assist X2;Maximum assistance  Rolling: Maximum assistance;Assist X2 (rolling multiple times each direction for bed pan placement and pericare)  Supine to Sit: Assist X2;Maximum assistance  Sit to Supine: Assist X2;Maximum assistance  Scooting: Assist X2;Maximum assistance  Balance  Sitting: Impaired  Sitting - Static: Poor (constant support)  Sitting - Dynamic: Poor (constant support)  Transfer Training  Transfer Training: No (attempted sit-stand from EOB with max Ax2 however, pt able to stand ~25% of a full stand)     ADL  Grooming: Moderate assistance  Grooming Skilled Clinical Factors: to wash face at EOB  Toileting: Dependent/Total  Toileting Skilled Clinical Factors: Pt reporting need for BM. Returned to supine and total A to place bed pan. Assist for all parts  Additional Comments: Pt declining further ADLs  OT Exercises  Circulation/Endurance Exercises: AAROM provided to KIARAE for edema management. Attempted AROM with pt having difficulting attending to task and following 1 step commandes  Dynamic Sitting Balance Exercises: Pt tolerated sitting at EOB for >10 mins with grossly min-mod A with brief periods of CGA. Lateral lean to L noted     Safety Devices  Type of Devices: All jv prominences offloaded;Call light within reach;Gait belt;Left in bed;Patient at risk for falls; Bed alarm in place;Nurse notified  Restraints  Restraints Initially in Place: No        AM-PAC score  AM-PAC Inpatient Daily Activity Raw Score: 9 (10/24/22 1544)  AM-PAC Inpatient ADL T-Scale Score : 25.33 (10/24/22 1544)  ADL Inpatient CMS 0-100% Score: 79.59 (10/24/22 1544)  ADL Inpatient CMS G-Code Modifier : CL (10/24/22 1544)    Goals  Short Term Goals  Time Frame for Short Term Goals: 1 week, 10/28/22- all goals ongoing 10/24  Short Term Goal 1: Pt will complete bed mobilty with mod Ax2 by 10/26  Short Term Goal 2: Pt will complete 10 reps BUE ex with SBA while seated EOB  Short Term Goal 3: Pt will require CGA to sit EOB and complete ADLs  Short Term Goal 4: Pt will complete sit<>stand transfer with mod A  Patient Goals   Patient goals : to not be in pain       Therapy Time   Individual Concurrent Group Co-treatment   Time In 1127         Time Out 1207         Minutes 40         Timed Code Treatment Minutes: 40 Minutes     If pt is unable to be seen after this session, please let this note serve as discharge summary. Please see case management note for discharge disposition. Thank you.       Cata Cano OT

## 2022-10-25 ENCOUNTER — APPOINTMENT (OUTPATIENT)
Dept: GENERAL RADIOLOGY | Age: 77
DRG: 308 | End: 2022-10-25
Payer: MEDICARE

## 2022-10-25 ENCOUNTER — TELEPHONE (OUTPATIENT)
Dept: CARDIOLOGY | Age: 77
End: 2022-10-25

## 2022-10-25 LAB
A/G RATIO: 0.9 (ref 1.1–2.2)
ALBUMIN SERPL-MCNC: 2.8 G/DL (ref 3.4–5)
ALP BLD-CCNC: 94 U/L (ref 40–129)
ALT SERPL-CCNC: 41 U/L (ref 10–40)
ANION GAP SERPL CALCULATED.3IONS-SCNC: 10 MMOL/L (ref 3–16)
AST SERPL-CCNC: 53 U/L (ref 15–37)
BASOPHILS ABSOLUTE: 0 K/UL (ref 0–0.2)
BASOPHILS RELATIVE PERCENT: 0.3 %
BILIRUB SERPL-MCNC: 0.5 MG/DL (ref 0–1)
BUN BLDV-MCNC: 25 MG/DL (ref 7–20)
C-REACTIVE PROTEIN: 95.7 MG/L (ref 0–5.1)
CALCIUM SERPL-MCNC: 8.4 MG/DL (ref 8.3–10.6)
CHLORIDE BLD-SCNC: 101 MMOL/L (ref 99–110)
CO2: 30 MMOL/L (ref 21–32)
CREAT SERPL-MCNC: 0.8 MG/DL (ref 0.8–1.3)
EOSINOPHILS ABSOLUTE: 0.3 K/UL (ref 0–0.6)
EOSINOPHILS RELATIVE PERCENT: 3.2 %
GFR SERPL CREATININE-BSD FRML MDRD: >60 ML/MIN/{1.73_M2}
GLUCOSE BLD-MCNC: 89 MG/DL (ref 70–99)
GLUCOSE BLD-MCNC: 94 MG/DL (ref 70–99)
GLUCOSE BLD-MCNC: 96 MG/DL (ref 70–99)
HCT VFR BLD CALC: 26.4 % (ref 40.5–52.5)
HEMOGLOBIN: 8.9 G/DL (ref 13.5–17.5)
LYMPHOCYTES ABSOLUTE: 0.8 K/UL (ref 1–5.1)
LYMPHOCYTES RELATIVE PERCENT: 8.1 %
MAGNESIUM: 1.9 MG/DL (ref 1.8–2.4)
MCH RBC QN AUTO: 30.3 PG (ref 26–34)
MCHC RBC AUTO-ENTMCNC: 33.7 G/DL (ref 31–36)
MCV RBC AUTO: 89.9 FL (ref 80–100)
MONOCYTES ABSOLUTE: 1 K/UL (ref 0–1.3)
MONOCYTES RELATIVE PERCENT: 10.1 %
NEUTROPHILS ABSOLUTE: 8.2 K/UL (ref 1.7–7.7)
NEUTROPHILS RELATIVE PERCENT: 78.3 %
PDW BLD-RTO: 16.5 % (ref 12.4–15.4)
PERFORMED ON: NORMAL
PERFORMED ON: NORMAL
PLATELET # BLD: 294 K/UL (ref 135–450)
PMV BLD AUTO: 7.5 FL (ref 5–10.5)
POTASSIUM SERPL-SCNC: 4.4 MMOL/L (ref 3.5–5.1)
RBC # BLD: 2.93 M/UL (ref 4.2–5.9)
SODIUM BLD-SCNC: 141 MMOL/L (ref 136–145)
TOTAL PROTEIN: 5.9 G/DL (ref 6.4–8.2)
WBC # BLD: 10.4 K/UL (ref 4–11)

## 2022-10-25 PROCEDURE — 83735 ASSAY OF MAGNESIUM: CPT

## 2022-10-25 PROCEDURE — 6370000000 HC RX 637 (ALT 250 FOR IP): Performed by: INTERNAL MEDICINE

## 2022-10-25 PROCEDURE — 92611 MOTION FLUOROSCOPY/SWALLOW: CPT

## 2022-10-25 PROCEDURE — 51702 INSERT TEMP BLADDER CATH: CPT

## 2022-10-25 PROCEDURE — 85025 COMPLETE CBC W/AUTO DIFF WBC: CPT

## 2022-10-25 PROCEDURE — 2060000000 HC ICU INTERMEDIATE R&B

## 2022-10-25 PROCEDURE — 80053 COMPREHEN METABOLIC PANEL: CPT

## 2022-10-25 PROCEDURE — 92526 ORAL FUNCTION THERAPY: CPT

## 2022-10-25 PROCEDURE — 97530 THERAPEUTIC ACTIVITIES: CPT

## 2022-10-25 PROCEDURE — 51798 US URINE CAPACITY MEASURE: CPT

## 2022-10-25 PROCEDURE — 74230 X-RAY XM SWLNG FUNCJ C+: CPT

## 2022-10-25 PROCEDURE — 86140 C-REACTIVE PROTEIN: CPT

## 2022-10-25 PROCEDURE — 36415 COLL VENOUS BLD VENIPUNCTURE: CPT

## 2022-10-25 PROCEDURE — C9113 INJ PANTOPRAZOLE SODIUM, VIA: HCPCS | Performed by: INTERNAL MEDICINE

## 2022-10-25 PROCEDURE — 99223 1ST HOSP IP/OBS HIGH 75: CPT | Performed by: PSYCHIATRY & NEUROLOGY

## 2022-10-25 PROCEDURE — 6370000000 HC RX 637 (ALT 250 FOR IP)

## 2022-10-25 PROCEDURE — 6360000002 HC RX W HCPCS: Performed by: INTERNAL MEDICINE

## 2022-10-25 RX ADMIN — ATORVASTATIN CALCIUM 20 MG: 10 TABLET, FILM COATED ORAL at 00:57

## 2022-10-25 RX ADMIN — ASPIRIN 81 MG 81 MG: 81 TABLET ORAL at 08:14

## 2022-10-25 RX ADMIN — LEVOTHYROXINE SODIUM 125 MCG: 0.12 TABLET ORAL at 05:58

## 2022-10-25 RX ADMIN — HEPARIN SODIUM 5000 UNITS: 5000 INJECTION INTRAVENOUS; SUBCUTANEOUS at 15:52

## 2022-10-25 RX ADMIN — HEPARIN SODIUM 5000 UNITS: 5000 INJECTION INTRAVENOUS; SUBCUTANEOUS at 05:58

## 2022-10-25 RX ADMIN — HEPARIN SODIUM 5000 UNITS: 5000 INJECTION INTRAVENOUS; SUBCUTANEOUS at 20:56

## 2022-10-25 RX ADMIN — PANTOPRAZOLE SODIUM 40 MG: 40 INJECTION, POWDER, FOR SOLUTION INTRAVENOUS at 08:15

## 2022-10-25 RX ADMIN — METOPROLOL TARTRATE 25 MG: 25 TABLET, FILM COATED ORAL at 20:56

## 2022-10-25 RX ADMIN — ATORVASTATIN CALCIUM 20 MG: 10 TABLET, FILM COATED ORAL at 20:56

## 2022-10-25 RX ADMIN — METOPROLOL TARTRATE 12.5 MG: 25 TABLET, FILM COATED ORAL at 08:14

## 2022-10-25 RX ADMIN — OLANZAPINE 20 MG: 5 TABLET, ORALLY DISINTEGRATING ORAL at 20:58

## 2022-10-25 RX ADMIN — METOPROLOL TARTRATE 12.5 MG: 25 TABLET, FILM COATED ORAL at 00:54

## 2022-10-25 ASSESSMENT — PAIN SCALES - GENERAL
PAINLEVEL_OUTOF10: 0
PAINLEVEL_OUTOF10: 2
PAINLEVEL_OUTOF10: 0
PAINLEVEL_OUTOF10: 2

## 2022-10-25 NOTE — CARE COORDINATION
Call placed to [phone] to [facility] . Previous  attempted to initiate contact yesterday per notes and left message. Writer was routed into a call que and on hold for an extended period of time. Finally was finally able to speak with Jaime Lowry. She transferred writer to [facility] who is following in PCP office at [phone]. Spoke with [place] . She is aware there is a pending request and will let writer know if he has benefit for LTACH. If not he will need SNF at minimum likely and have to have NGT removed and tolerate a diet or have another means of nutrition if LTACH is not an option. [address] determination for [facility], [facility] and will let team know as soon as writer can get approval. Faxed clinical to [phone]. Maritza Núñez the  there is helping Kalee review the information and make determination and will get back with writer.

## 2022-10-25 NOTE — TELEPHONE ENCOUNTER
Monitor company [employer]  Length of monitor 14 days  Monitor ordered by April Ford CNP   Patch ID [id number]  Device number [id number]  Monitor given to Marie Fields RN. Nurse to apply at the time of discharge.

## 2022-10-25 NOTE — PROCEDURES
Speech Language Pathology  Modified Barium Swallow Study  Facility/Department: [place] [id number] [facility]        Recommendations:  Diet recommendation: IDDSI 4 Puree Solids; IDDSI 0 Thin Liquids; Meds crushed in puree as able  Risk management: upright for all intake, stay upright for at least 30 mins after intake, small bites/sips, distant supervision with intake, oral care 2-3x/day to reduce adverse affects in the event of aspiration, alternate bites/sips, slow rate of intake, general GERD precautions, general aspiration precautions, and hold PO and contact SLP if s/s of aspiration or worsening respiratory status develop.       NAME:Emilio Palacios  : 1945 (71 y.o.)   MRN: [id number]  ROOM: [address] [place] [address]  ADMISSION DATE: 10/15/2022  PATIENT DIAGNOSIS(ES): Bradycardia [R00.1]  Symptomatic bradycardia [R00.1]  Chief Complaint   Patient presents with    Cardiac Arrest     Air care from [facility]     Patient Active Problem List    Diagnosis Date Noted    Closed traumatic subcoracoid dislocation of right shoulder joint 10/23/2022    Proteus mirabilis infection 10/18/2022    Staphylococcal pneumonia ([address] 10/18/2022    Pyuria 10/17/2022    Aspiration into airway 10/17/2022    Complete heart block ([address]     Systolic heart failure ([address] 10/16/2022    LBBB (left bundle branch block) 10/16/2022    Non-ischemic cardiomyopathy ([address] 10/16/2022    Symptomatic bradycardia 10/16/2022    AUSTIN (acute kidney injury) ([address] 10/16/2022    Pulmonary infiltrates 10/16/2022    Shock circulatory ([address] 10/16/2022    Normocytic hypochromic anemia 10/16/2022    Observed seizure-like activity ([address] 10/16/2022    Acute respiratory failure with hypoxia ([address] 10/16/2022    Dementia ([address] 10/15/2022    Hypercholesteremia 10/12/2022    New onset seizure ([address] 10/12/2022    Other specified hypothyroidism 10/12/2022    Paroxysmal atrial fibrillation ([address] 10/12/2022    Benign prostatic hyperplasia, presence of lower urinary tract symptoms unspecified 10/12/2022    Schizoaffective disorder ([address] 10/11/2022    Long term current use of amiodarone 12/16/2014    Atrial flutter ([address]     Cardiomyopathy ([address]     Schizophrenia ([address]     CKD (chronic kidney disease)     CHF (congestive heart failure) (HCC)     PVC's (premature ventricular contractions)      Past Medical History:   Diagnosis Date    Acute kidney injury ([address]     Anemia     Hx of     Arthritis     Atrial fibrillation ([address]     Atrial flutter ([address]     converted to NSR, Poor candidate for anticoagulation. CAD (coronary artery disease)     Cardiomyopathy ([address]     ? Nonishcemic, Echo 10/2014 LVEF 25-30%. Cellulitis of right upper extremity     CHF (congestive heart failure) (HCC)     systolic    CKD (chronic kidney disease)     Cr 2.3 10/2014, not on ACE/ARB due to CKD    Hypertension     Hypovolemia     PVC's (premature ventricular contractions)     secondary to hypokalemia    Schizophrenia ([address]     hx of    Seizures ([address]     Urinary tract infection      Past Surgical History:   Procedure Laterality Date    APPENDECTOMY      BRONCHOSCOPY N/A 10/17/2022    BRONCHOSCOPY ALVEOLAR LAVAGE performed by Leland Hamlin MD at [facility]  10/17/2022    BRONCHOSCOPY THERAPUTIC ASPIRATION INITIAL performed by Leland Hamlin MD at [facility] N/A 10/19/2022    BRONCHOSCOPY DIAGNOSTIC OR CELL [address] ONLY performed by Leland Hamlin MD at [address] [place] [address]       Allergies   Allergen Reactions    Penicillins          Current Diet Solid Consistency: Pureed diet (IDDSI 4)  Current Diet Liquid Consistency: Mildly thick (nectar) liquids (IDDSI 2)    Date of Prior Study: n/a  Results of Prior Study: n/a    Recent CXR (10/23/22): Impression   1. Retraction of the endotracheal tube into high normal position.    2. No significant change in bibasilar airspace opacities worse on the right   due in part to passive atelectasis given at least trace bilateral pleural   effusions. Superimposed pneumonia and aspiration or aspiration is suspected   given comparison CT appearance. 3. Pulmonary vascular congestion and at least borderline cardiomegaly. 4. Incomplete evaluation of apparent right glenohumeral joint dislocation. 5. Age-indeterminate left humeral head fracture. Patient Complaints/Reason for Referral:  Keon Hammond was referred for a MBS to assess the efficiency of his/her swallow function, assess for aspiration, and to make recommendations regarding safe dietary consistencies, effective compensatory strategies, and safe eating environment. Pain   Patient Currently in Pain: No    General Comments:   Per MD H&P, \"\"Emilio Annette Melendez is a 78 y.o. male. Patient has a h/o schizoaffective disorder. He lives in [place], [place]. He receives most of his medical care through the [facility] in [place]. [facility] [place] [place] for hallucinations and impulsive behavior. He was there for ten days then transferred to the [facility] geriatric inpatient psychiatry unit on 10/11. Around 9pm on 10/15 a code blue was called because the patient briefly went unresponsive. He had been bradycardic all day  He had been taking metoprolol chronically but this had been held throughout the day because of his bradycardia. His nurse was trying to check his vitals again as it appeared he was breathing abnormally. While vital signs were being checked the patient lost consciousness briefly (less than a minute). He awoke but was markedly bradycardic. He was sent to the ER where he was found to be hypothermic, hypotensive, and bradycardic to the 20s in complete heart block. He was transferred to [facility] for urgent transvenous pacing lead placement. Patient had a h/o chronic systolic HF, thought to be nonischemic, and paroxysmal afib.      The patient required sedation during temporary pacing lead placement and then became agitated thereafter. He required restraints and was given IM geodon, so was not meaningfully interactive or able to provide any history. According to Dr. Guillermina Major notes the patient had already been difficult to understand at baseline. \"    Pt was intubated from 10/16-10/20/22. Pt downgraded from minced and moist to puree diet earlier this date, continue mildly thick (nectar) liquids pending completion of MBSS. Pt continues with NG in place. Medical record review/interview:   Predisposing dysphagia risk factors: Age  Clinical signs of possible chronic dysphagia: recurrent PNA  Precipitating dysphagia risk factors: reduced physical mobility and increased O2 demands    Impressions:  Treatment Dx: Oropharyngeal dysphagia  Radiologist: Dr. Kamar Olivier  Referring MD: Yoko GONZALES-CNP    Assessment:     Oral Preparation / Oral Phase  Impaired  Oral Phase - Major Contributing Deficits  Weak Lingual Manipulation: All  Lingual Pumping: Puree  Decreased Bolus Cohesion: Puree  Piecemeal Swallowing: Puree  Premature Bolus Loss to Pharynx: All  Oral Phase Comment: Liquids administered via tsp and straw only during study d/t presence of NG (pt unable to successfully coordinate cup sips despite assistance). Pharyngeal Phase  Impaired  Pharyngeal Phase - Major Contributing Deficits  Delayed Swallow Initiation: All  Premature Spillage to Valleculae: All  Premature Spillage to Pyriform: Thin straw , Mildly (nectar) thick straw   Reduced Pharyngeal Peristalsis: All  Reduced Epiglottic Retroversion: All; of note, NG in place  Reduced Laryngeal Elevation:  All  Reduced Anterior Laryngeal Movement: All  Reduced Thyrohyoid Approximation: All  Pooling Valleculae: All  Reduced Airway/laryngeal Closure: All  Reduced Tongue Base Retraction:  All  Shallow Penetration During: Thin straw   Complete Self-clearing (shallow):  Thin straw   Pharyngeal Residue - Valleculae: All; increased to moderate-severe with puree initially  Pharyngeal Wall - Weakness: All; again, NG in place  Pharyngeal phase comment: Reduced hyolaryngeal excursion, reduced pharyngeal peristalsis, and reduced tongue base retraction resulted in moderate-severe valleculae residue post-swallow with puree. Suspect presence of NG also negatively impacting overall pharyngeal peristalsis / clearance. Use of double swallow and chin tuck (during and after the swallow) were largely ineffectively at clearing this residue. Liquid wash (thin liquid via straw) successfully cleared majority of residue. Soft and regular solid trials held during study d/t severity of valleculae residue with puree. X1 instance of shallow penetration observed during the swallow with thin liquid via straw that completely cleared with the swallow. No aspiration. No penetration/aspiration noted with remainder of thin liquid trials, mildly thick (nectar) via straw, or solid PO trials. Esophageal Phase  Appears WFL when viewed at the cervical level throughout the duration of the study      Aspiration Scale   1 Material does not enter the airway   X 2 Material enters the airway, remains above the vocal folds, and is ejected from the airway    3 Material enters the airway, remains above the vocal folds, and is not ejected from the airway    4 Material enters the airway, contacts the vocal folds, an is ejected from the airway    5 Material enters the airway, contacts the vocal folds, and is not ejected from the airway    6 Material enters the airway, passes below the vocal folds and is ejected into the larynx or out of the airway    7 Material enters the airway, passes below the vocal folds, and is not ejected from the trachea despite effort    8 Material enters the airway, passes below the vocal folds, and no effort is made to eject.            Compensatory Swallowing Strategies Attempted: small bites/sips, upright positioning with PO, alternate bites/sips, double swallow, chin tuck  Postural Changes and/or Swallow Maneuvers Trialed: chin tuck  Patient Position: Lateral and Patient Degrees: 90 degrees, Seated upright in [facility] chair  Consistencies Administered: Thin-tsp, straw; Mildly thick (nectar)- straw; Puree       Recommendations:  Diet recommendation: IDDSI 4 Puree Solids; IDDSI 0 Thin Liquids; Meds crushed in puree as able  Risk management: upright for all intake, stay upright for at least 30 mins after intake, small bites/sips, distant supervision with intake, oral care 2-3x/day to reduce adverse affects in the event of aspiration, alternate bites/sips, slow rate of intake, general GERD precautions, general aspiration precautions, and hold PO and contact SLP if s/s of aspiration or worsening respiratory status develop.     Safe Swallow Protocol:  Supervision: Close  Compensatory Swallowing Strategies: HOB 90* and 30\" after meals; small bites/sips; alternate solids/liquids every 3-5 bites; oral care after every meal      Behavior/Cognition/Vision/Hearing:  Behavior/Cognition: alert, cooperative  Vision: wears glasses  Hearing: adequate    Recommendations/Treatment  Requires SLP Intervention: Yes  D/C Recommendations: per PT/OT recs  Postural Changes and/or Swallow Maneuvers: n/a  Referral To: n/a    Recommended Exercises: laryngeal/pharyngeal strengthening exercises  Therapeutic Interventions: diet tolerance monitoring, patient/family education, oral care     Education: Images and recommendations were reviewed with pt following this exam.   Patient Education Response: pt verbalized understanding, needs ongoing reinforcement    Prognosis for safe diet advancement: good  Barriers to reach goals:   Duration/Frequency of Treatment: 3-5x/week for LOS  Safety Devices in place: Yes  Type of devices: Pt left [facility] in no distress; left with transport      Goals:    Short Term Goals:  Timeframe for Short Term Goals: (5 days 10/26/2022)  Goal 1: The patient will tolerate recommended diet with no clinical s/s of aspiration 5/5. Goal 2: The patient will tolerate therapeutic diet upgrade trials with no clinical s/s of aspiration 5/5. Goal 3: The patient/caregiver will demonstrate understanding of compensatory swallow strategies, for improved swallow safety. Goal 4: The patient will tolerate repeat BSE when able for ongoing assessment. Goal 5: The patient will tolerate instrumental assessment when able , if appropriate. Long Term Goals:   Timeframe for Long Term Goals: (7 days 10/28/2022)  Goal 1: The patient will tolerate least restrictive diet with no clinical s/s of aspiration or worsening respiratory/pulmonary status.        Therapy Time:   Individual Concurrent Group Co-treatment   Time In 1427         Time Out 1450         Minutes 23           MBSS procedure and dysphagia tx    Signature:  Claude Mile, M.S. Suraj Lancaster  Speech-language pathologist  [id number]

## 2022-10-25 NOTE — CONSULTS
[facility]  HEART FAILURE PROGRAM      Mercedes Love 1945    History:  Past Medical History:   Diagnosis Date    Acute kidney injury ([facility]     Anemia     Hx of     Arthritis     Atrial fibrillation ([facility]     Atrial flutter ([facility]     converted to NSR, Poor candidate for anticoagulation. CAD (coronary artery disease)     Cardiomyopathy ([address]     ? Nonishcemic, Echo 10/2014 LVEF 25-30%. Cellulitis of right upper extremity     CHF (congestive heart failure) (HCC)     systolic    CKD (chronic kidney disease)     Cr 2.3 10/2014, not on ACE/ARB due to CKD    Hypertension     Hypovolemia     PVC's (premature ventricular contractions)     secondary to hypokalemia    Schizophrenia ([facility]     hx of    Seizures (HCC)     Urinary tract infection        ECHO:  10/17/22  EF 35%  HgA1C:  10/11/22  5.9  Iron Saturation:  10/24/22   12  Ferritin:     ACE/ARB/ARNI: AUSTIN  BB: Lopressor 12.5mg BID, Bradycardia  Spironolactone:  SGLT2:    Last Hospital Admission:  10/11/22 [facility] for psychiatry  Discharge plans: [facility]    Advanced Directives: patient has advance directives scanned in the chart    Chart review completed. Patient a 78year old male,a dmitted for Bradycardia. Hospital day 10, currently on C4 level of care. During this hospitalization pt required intubation due to respiratory failure/pneumonia/CHF. Extubated on 10/20/22. Cardiology also following to assist with management. Plans are for cardiac monitor at discharge. Pt with history of schizoaffective disorder and had recent hospitalization as a result. Current plans are for [facility] placement for continued medical management. Awaiting approval from [place]. There they will monitor for any s/s of CHF including daily weights and diet/fluid restrictions.        Patient recent weights and intake/output reviewed:    Patient Vitals for the past 96 hrs (Last 3 readings):   Weight   10/25/22 0511 191 lb 9.6 oz (86.9 kg) 10/24/22 0448 198 lb 9.6 oz (90.1 kg)   10/23/22 0345 196 lb 1.6 oz (89 kg)         Intake/Output Summary (Last 24 hours) at 10/25/2022 1039  Last data filed at 10/25/2022 0945  Gross per 24 hour   Intake 1428 ml   Output 700 ml   Net 728 ml       Education Time: chart review completed.      Marcellina Dandy, RN   10/25/2022 10:39 AM

## 2022-10-25 NOTE — PROGRESS NOTES
[facility]     Electrophysiology                                     Progress Note    Admission date:  10/15/2022    Reason for follow up visit: CHB/AF    HPI/CC: Sudhakar Herrera was admitted on 10/15/2022 from [facility]. He was a patient on the inpatient psychiatry unit. He was found to be hypothermic and bradycardic. EKG showed CHB with junctional escape rhythm. He was transferred to [facility] and had a temporary venous pacemaker placed. Dopamine was started. Dopamine has since been discontinued. On 10/20/2022 he was extubated and temporary pacing wire was pulled. On 10/21/2022 low dose metoprolol was restarted. Rhythm has been SR with PVC's, heart rates in the 70's-80's. NSVT events noted. Subjective: He is seen resting in bed with family at bedside. He just got back from his swallow evaluation. He denies chest pain, palpitations, shortness of breath, and dizziness. Vitals:  Blood pressure 131/85, pulse 86, temperature 98 °F (36.7 °C), temperature source Axillary, resp. rate 16, height 5' 8\" (1.727 m), weight 191 lb 9.6 oz (86.9 kg), SpO2 96 %.   Temp  Av.6 °F (37 °C)  Min: 98 °F (36.7 °C)  Max: 99.9 °F (37.7 °C)  Pulse  Av.4  Min: 80  Max: 96  BP  Min: 106/59  Max: 131/85  SpO2  Av.6 %  Min: 94 %  Max: 98 %    24 hour I/O    Intake/Output Summary (Last 24 hours) at 10/25/2022 1628  Last data filed at 10/25/2022 1311  Gross per 24 hour   Intake 1548 ml   Output 1100 ml   Net 448 ml       Current Facility-Administered Medications   Medication Dose Route Frequency Provider Last Rate Last Admin    metoprolol tartrate (LOPRESSOR) tablet 25 mg  25 mg Oral BID CHRISTIAN Orellana - CNP        glucose chewable tablet 16 g  4 tablet Oral PRN Wolfgang Crowley MD        dextrose bolus 10% 125 mL  125 mL IntraVENous PRN Wolfgang Crowley MD   Stopped at 10/21/22 1709    Or    dextrose bolus 10% 250 mL  250 mL IntraVENous PRN Wolfgang Crowley MD        glucagon (rDNA) injection 1 mg  1 mg SubCUTAneous PRN Luisa Jackson MD        dextrose 10 % infusion   IntraVENous Continuous PRN Luisa Jackson MD        OLANZapine zydis (ZYPREXA) disintegrating tablet 20 mg  20 mg Oral Nightly Luisa Jackson MD   20 mg at 10/23/22 2201    magnesium sulfate 1000 mg in dextrose 5% 100 mL IVPB  1,000 mg IntraVENous PRN Luisa Jackson MD        docusate (COLACE) 50 MG/5ML liquid 100 mg  100 mg Oral Daily Luigi Hdz MD   100 mg at 10/24/22 0857    potassium chloride 20 mEq/50 mL IVPB (Central Line)  20 mEq IntraVENous PRN Sissy Uriarte MD        Or    potassium chloride 10 mEq/100 mL IVPB (Peripheral Line)  10 mEq IntraVENous PRN Sissy Uriarte MD        ondansetron [facility]) injection 4 mg  4 mg IntraVENous Q6H PRN Sissy Uriarte MD        acetaminophen (TYLENOL) tablet 650 mg  650 mg Oral Q4H PRN Sissy Uriarte MD   650 mg at 10/22/22 8129    Or    acetaminophen (TYLENOL) suppository 650 mg  650 mg Rectal Q4H PRN Sissy Uriarte MD   650 mg at 10/24/22 2108    ziprasidone (GEODON) injection 10 mg  10 mg IntraMUSCular Q12H PRN Sissy Uriarte MD   10 mg at 10/23/22 1334    levothyroxine (SYNTHROID) tablet 125 mcg  125 mcg Oral Daily Sissy Uriarte MD   125 mcg at 10/25/22 0558    melatonin tablet 3 mg  3 mg Oral Nightly PRN Sissy Uriarte MD   3 mg at 10/21/22 2037    atorvastatin (LIPITOR) tablet 20 mg  20 mg Oral Nightly Sissy Uriarte MD   20 mg at 10/25/22 0057    heparin (porcine) injection 5,000 Units  5,000 Units SubCUTAneous 3 times per day Luisa Jackson MD   5,000 Units at 10/25/22 1552    pantoprazole (PROTONIX) injection 40 mg  40 mg IntraVENous Daily Luisa Jackson MD   40 mg at 10/25/22 0815    aspirin chewable tablet 81 mg  81 mg Oral Daily Tariq Haddox, DO   81 mg at 10/25/22 2343    perflutren lipid microspheres (DEFINITY) injection 1.65 mg  1.5 mL IntraVENous ONCE PRN Tariq Escobarx, DO           Objective:     Telemetry monitor: SR with PVC's, NSVT    Physical Exam:  Constitutional and general appearance: alert, cooperative, no distress, and appears stated age  [de-identified]: PERRL, no cervical lymphadenopathy. No masses palpable. Normal oral mucosa  Respiratory:  Normal excursion and expansion without use of accessory muscles  Resp auscultation: clear anteriorly   Cardiovascular: The apical impulse is not displaced  Heart tones are crisp and normal. regular S1 and S2.  Jugular venous pulsation Normal  The carotid upstroke is normal in amplitude and contour without delay or bruit  Peripheral pulses are symmetrical and full   Abdomen:  No masses or tenderness  Bowel sounds present  Extremities:   No cyanosis or clubbing   Yes, +1 pitting left ankle, none in RLE   Skin: warm and dry  Neurological:  Alert and oriented  Moves all extremities well  No abnormalities of mood, affect, memory, mentation, or behavior are noted    Data  Echo 10/17/2022:   Summary   Technically difficult study due to poor acoustic window and patient on vent. Difficult to assess left ventricular systolic function due to arrhythmia but appears moderately reduced with an ejection fraction of 35% (better assessed   on definity images, however ectopy makes assessment of lv function   difficult)   Normal left ventricular size with mild concentric left ventricular   hypertrophy. Left ventricular diastolic filling pressure is normal     Stress test 7/2022 [facility]):   Conclusions     * Mild heterogeneity of isotope uptake noted but not in a pattern consistent   with ischemia. * Ejection fraction is normal.     * No significant reversible defects. Echo 7/2022 [facility])  Conclusions     * Left ventricular chamber dimension is normal.     * Left ventricular function is mildly reduced with an estimated ejection   fraction of 40-45%.      * Left ventricular segmental wall motion is abnormal.     * Right ventricular systolic function is normal.     * Unable to estimate pulmonary arterial systolic pressure due to lack of tricuspid regurgitation jet. * There is mild aortic valve regurgitation. * The aortic arch is mildly dilated. * The basal inferior wall, mid inferior wall, basal anterolateral wall, mid   anterolateral wall, basal inferolateral wall, and mid inferolateral wall are   hypokinetic. * All other walls appear normal.    All labs and testing reviewed.   Lab Review     Renal Profile:   Lab Results   Component Value Date/Time    CREATININE 0.8 10/25/2022 04:53 AM    BUN 25 10/25/2022 04:53 AM     10/25/2022 04:53 AM    K 4.4 10/25/2022 04:53 AM    K 3.9 10/22/2022 04:31 AM     10/25/2022 04:53 AM    CO2 30 10/25/2022 04:53 AM     CBC:    Lab Results   Component Value Date/Time    WBC 10.4 10/25/2022 04:53 AM    RBC 2.93 10/25/2022 04:53 AM    HGB 8.9 10/25/2022 04:53 AM    HCT 26.4 10/25/2022 04:53 AM    MCV 89.9 10/25/2022 04:53 AM    RDW 16.5 10/25/2022 04:53 AM     10/25/2022 04:53 AM     BNP:  No results found for: BNP  Fasting Lipid Panel:    Lab Results   Component Value Date/Time    CHOL 109 10/11/2022 03:56 PM    HDL 61 10/11/2022 03:56 PM    TRIG 56 10/11/2022 03:56 PM     Cardiac Enzymes:  CK/MbTroponin  Lab Results   Component Value Date/Time    TROPONINI <0.01 10/15/2022 10:00 PM     PT/ INR   Lab Results   Component Value Date/Time    INR 1.18 10/22/2022 04:31 AM    INR 1.12 10/21/2022 04:15 AM    INR 1.11 10/20/2022 04:21 AM    PROTIME 15.0 10/22/2022 04:31 AM    PROTIME 14.3 10/21/2022 04:15 AM    PROTIME 14.2 10/20/2022 04:21 AM     PTT No results found for: PTT   Lab Results   Component Value Date/Time    MG 1.90 10/25/2022 04:53 AM      Lab Results   Component Value Date/Time    TSH 1.82 10/11/2022 03:56 PM     Assessment:  Complete heart block with junctional escape: resolved   -s/p temporary venous pacemaker placement at admission   -temporary wire pulled 10/20/2022  Paroxysmal atrial fibrillation: stable   -[id number] score 4 (age, CHF, HTN)  -not previously anticoagulated due to falls  NSVT:   First degree AV block: stable  PAC's/PVC's: stable  LBBB  Chronic systolic CHF: compensated   Nonischemic cardiomyopathy: ongoing  -EF 35% on 10/2022  -has declined ICD in past  HTN: controlled   Acute respiratory failure requiring intubation  -extubated 10/20/2022   Hyperkalemia: resolved  Hyponatremia: resolved  Hypoglycemia: resolved  AUSTIN/CKD: resolved  BPH  Anemia  Hypotension  Hypothermia  Schizophrenia       Plan:   1. Increase Lopressor to 25 mg PO BID due to NSVT events on telemetry  2. Muriel (POA) not at bedside today.  Will talk with her tomorrow about starting anticoagulation for stroke risk given [id number] score of 4 and PAF  3. 2 week cardiac event monitor ordered at discharge        CHRISTIAN Wilcox-CNP  [facility]  [phone]

## 2022-10-25 NOTE — PLAN OF CARE
Patient's EF (Ejection Fraction) is less than 40%    Heart Failure Medications:  Diuretics[de-identified] Furosemide, Torsemide, Spironolactone, Metalozone, Other, and None    (One of the following REQUIRED for EF </= 40%/SYSTOLIC FAILURE but MAY be used in EF% >40%/DIASTOLIC FAILURE)        ACE[de-identified] None        ARB[de-identified] None         ARNI[de-identified] None    (Beta Blockers)  NON- Evidenced Based Beta Blocker (for EF% >40%/DIASTOLIC FAILURE): Metoprolol TARTrate- Lopressor    Evidenced Based Beta Blocker::(REQUIRED for EF% <40%/SYSTOLIC FAILURE) None  . .................................................................................................................................................. Patient's weights and intake/output reviewed: No    Patient's Last Weight:198.5 lbs lbs obtained by bed scale. Difference of 2.5 lbs more than last documented weight. Intake/Output Summary (Last 24 hours) at 10/25/2022 0420  Last data filed at 10/25/2022 0058  Gross per 24 hour   Intake 648 ml   Output 1350 ml   Net -702 ml       Education Booklet Provided: no    Comorbidities Reviewed Yes    Patient has a past medical history of Acute kidney injury ([address] Anemia, Arthritis, Atrial fibrillation ([address] Atrial flutter ([address] CAD (coronary artery disease), Cardiomyopathy ([address] Cellulitis of right upper extremity, CHF (congestive heart failure) ([address] CKD (chronic kidney disease), Hypertension, Hypovolemia, PVC's (premature ventricular contractions), Schizophrenia ([address] Seizures ([address] and Urinary tract infection. >>For CHF and Comorbidity documentation on Education Time and Topics, please see Education Tab    Progressive Mobility Assessment:  What is this patient's Current Level of Mobility?: Requires Bed Rest  How was this patient Mobilized today?: Edge of Bed, Up to Chair, Bedside Commode,  Up to Toilet/Shower, Up in Room, Up in [place], Unable to Mobilize, and Patient Refuses to Mobilize, ambulated 0 ft                 With Whom?   N/a, Nurse, PCA, PT, OT, and Self                 Level of Difficulty/Assistance:  n/a      Pt resting in bed at this time on  2 L O2. Pt denies shortness of breath. Pt with pitting lower extremity edema.      Patient and/or Family's stated Goal of Care this Admission: reduce shortness of breath, increase activity tolerance, better understand heart failure and disease management, be more comfortable, and reduce lower extremity edema prior to discharge        :

## 2022-10-25 NOTE — PLAN OF CARE
ORTHO NOTE    Patient not seen or examined. Follow-up note placed in order to facilitate patient care. Please see previous consult note regarding discussion about closed reduction of the right shoulder dislocation. The patient's power of  recommended that we proceed with nonsurgical management. No restrictions upon the use of the right arm. May use a sling for comfort if painful. No evidence of neurovascular compromise at the time of my evaluation. I asked her to contact the orthopedic team if she would like to proceed with any form of intervention. At the current time, she has not contacted us. We will sign off. Please reconsult if we can be of assistance in any way.     Tino Martino MD

## 2022-10-25 NOTE — PROGRESS NOTES
Comprehensive Nutrition Assessment    Type and Reason for Visit:  Reassess    Nutrition Recommendations/Plan:   Diet per SLP; pending MBSS today  Continue Jevity 1.5 (standard with fiber formula) at goal of 60 mL/hr via NG. If able to safely consume PO, monitor for intake adequacy and ability to wean TF  Recommend 60 mL H20 q 4 hours. Increase flush if Na+ increases greater than 145 mEq/L. Monitor for tolerance (bowel habits, N/V, cramping, abdominal exam findings: distended, firm, tense, guarded, discomfort). Monitor nutrition adequacy, pertinent labs, bowel habits, wt changes, and clinical progress     Malnutrition Assessment:  Malnutrition Status: At risk for malnutrition (Comment) (10/21/22 1021)    Context:  Acute Illness     Findings of the 6 clinical characteristics of malnutrition:  Energy Intake:  Mild decrease in energy intake (Comment)    Nutrition Assessment:    Follow up: Diet advanced to minced and moist w/ nectar thick liquids per SLP on 10/22, downgraded to pureed/ nectar thick today. Pt continues w/ TF via NG, Jevity 1.5 at goal of 60 ml/hr. Spoke w/ RN, TF being held this afternoon for MBSS d/t concern for aspiration with PO intakes. Recommend to resume previous TF regimen after MBSS to promote adequate nutrition. If able to tolerate PO diet, monitor PO intakes and wean TF as able. Will evaluate ability to add ONS pending diet recs. Continue to monitor. Nutrition Related Findings:    Labs reviewed.  10/22. +3 pitting BUE, +1 BLE edema. Wound Type: None       Current Nutrition Intake & Therapies:    Average Meal Intake: 51-75%, %  Average Supplements Intake: None Ordered  ADULT TUBE FEEDING; Nasogastric; Standard with Fiber; Continuous; 10; Yes; 10; Q 4 hours; 60; 60; Q 4 hours  ADULT DIET;  Dysphagia - Pureed; Mildly Thick (Nectar)    Anthropometric Measures:  Height: 5' 8\" (172.7 cm)  Ideal Body Weight (IBW): 154 lbs (70 kg)    Admission Body Weight: 203 lb (92.1 kg)  Current Body Weight: 191 lb (86.6 kg), 132 % IBW. Weight Source: Bed Scale  Current BMI (kg/m2): 29        Weight Adjustment For: No Adjustment                 BMI Categories: Overweight (BMI 25.0-29. 9)    Estimated Daily Nutrient Needs:  Energy Requirements Based On: Kcal/kg (25-30)  Weight Used for Energy Requirements: Ideal  Energy (kcal/day): 9827-6594 kcal  Weight Used for Protein Requirements: Ideal (1.0-1.2 g/kg)  Protein (g/day): 70-84 g  Method Used for Fluid Requirements: 1 ml/kcal  Fluid (ml/day): 3975-8625 mL    Nutrition Diagnosis:   Inadequate oral intake related to swallowing difficulty as evidenced by nutrition support - enteral nutrition, swallow study results    Nutrition Interventions:   Food and/or Nutrient Delivery: Continue Current Tube Feeding (Diet per SLP)  Nutrition Education/Counseling: No recommendation at this time  Coordination of Nutrition Care: Continue to monitor while inpatient       Goals:  Previous Goal Met: Progressing toward Goal(s)  Goals: Meet at least 75% of estimated needs, by next RD assessment       Nutrition Monitoring and Evaluation:   Behavioral-Environmental Outcomes: None Identified  Food/Nutrient Intake Outcomes: Diet Advancement/Tolerance, Enteral Nutrition Intake/Tolerance  Physical Signs/Symptoms Outcomes: Biochemical Data, Chewing or Swallowing, Nutrition Focused Physical Findings, Weight    Discharge Planning:     Too soon to determine     Mookie Ruiz, [address]  Contact: [phone]

## 2022-10-25 NOTE — PROGRESS NOTES
Physical Therapy  Facility/Department: [facility] [address] C4 PCU  Daily Treatment Note  NAME: Karen Wallace  : 1945  MRN: [id number]    Date of Service: 10/25/2022    Discharge Recommendations:  Subacute/Skilled Nursing Facility, [facility], [facility]   PT Equipment Recommendations  Equipment Needed: No  Other: report after further assessent    Patient Diagnosis(es): The primary encounter diagnosis was Bradycardia. Diagnoses of Complete heart block (HCC) and PVC's (premature ventricular contractions) were also pertinent to this visit. Assessment   Assessment: Pt continues to require max A x2 for bed mobility. When transfering to chair, max A x3 was required for sit to stand and the use of Viveca Pennant. When transfering stand to sit, pt required max-A x3. When standing on the Viveca Pennant, pt continued to move his trunk forward. 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 When d/c, PT recommends SNF or L-TACH depending on insurance coverage. Activity Tolerance: Patient tolerated treatment well;Treatment limited secondary to decreased cognition  Equipment Needed: No  Other: report after further assessent     Plan    Physcial Therapy Plan  General Plan: 3-5 times per week  Current Treatment Recommendations: Strengthening;ROM;Balance training;Functional mobility training;Transfer training;ADL/Self-care training;Cognitive/Perceptual training; Endurance training;Gait training;Stair training;Neuromuscular re-education;Patient/Caregiver education & training; Safety education & training;Pain management;Positioning; Therapeutic activities  PT Plan of Care: 5 times/week;4 times/week;3 times/week     Restrictions  Restrictions/Precautions  Restrictions/Precautions: Fall Risk, Up as Tolerated, Bed Alarm  Required Braces or Orthoses?: No  Position Activity Restriction  Other position/activity restrictions: tele, NG tube, 2L O2     Subjective    Subjective  Subjective: Pt agreeable to therapy.  Difficult to understand speech at times  Pain: no direct c/o pain  Orientation  Overall Orientation Status: Impaired  Orientation Level: Oriented to place;Oriented to person;Disoriented to time;Disoriented to situation  Cognition  Overall Cognitive Status: Exceptions  Arousal/Alertness: Delayed responses to stimuli  Following Commands: Follows one step commands with increased time  Attention Span: Difficulty dividing attention  Memory: Decreased recall of precautions;Decreased recall of recent events  Safety Judgement: Decreased awareness of need for assistance;Decreased awareness of need for safety  Problem Solving: Assistance required to correct errors made  Insights: Decreased awareness of deficits  Initiation: Requires cues for all  Sequencing: Requires cues for all     Objective   Bed Mobility Training  Bed Mobility Training: Yes  Overall Level of Assistance: Assist X2;Maximum assistance  Interventions: Manual cues;Demonstration; Safety awareness training; Tactile cues; Verbal cues; Visual cues; Weight shifting training/pressure relief  Rolling: Maximum assistance;Assist X2  Supine to Sit: Assist X2;Maximum assistance  Sit to Supine: Maximum assistance; Moderate assistance;Assist X2 (Max-A x1 and mod-A x1)  Scooting: Assist X2;Maximum assistance  Balance  Sitting: Impaired  Sitting - Static: Poor (constant support)  Sitting - Dynamic: Poor (constant support)  Standing: Impaired  Standing - Static: Constant support  Transfer Training  Transfer Training: Yes  Overall Level of Assistance: Maximum assistance; Other (comment) (for transfering required max A x3)  Interventions: Demonstration;Manual cues; Safety awareness training; Tactile cues; Verbal cues; Visual cues  Sit to Stand: Maximum assistance; Other (comment) (Max-A x3)  Stand to Sit: Maximum assistance (Max-A x3)  Bed to Chair: Maximum assistance (maxA x 3 using [place], max verbal/tactile cues needed for upright trunk in [place])  Gait Training  Gait Training: No  Wheelchair Management  Wheelchair Management: No    Safety Devices  Type of Devices:  All jv prominences offloaded;Call light within reach;Gait belt;Left in bed;Patient at risk for falls;Nurse notified (transport arrived taking [facility] to [facility])  Restraints  Restraints Initially in Place: No     Goals  Short Term Goals  Time Frame for Short Term Goals: 7 days (10/28/22)  Short Term Goal 1: Pt will be able to conduct bed mobility exercise with min-A of x2  Short Term Goal 2: Pt will be able to perform 10x BLE exercises with Min-A x1  Short Term Goal 3: Pt will tolerate sitting EOB x10 minutes with Supervision  Short Term Goal 4: Pt will transfer from sitting at edge of bed to chair min-A x2 with RW by 10/24/22  Patient Goals   Patient Goals : none stated by pt    Education  Patient Education  Education Given To: Patient  Education Provided: Role of Therapy;Plan of Care;Transfer Training;Orientation  Education Method: Demonstration;Verbal  Barriers to Learning: Cognition;Vision  Education Outcome: Demonstrated understanding;Verbalized understanding    Therapy Time   Individual Concurrent Group Co-treatment   Time In 1350         Time Out 1430         Minutes 40         Timed Code Treatment Minutes: [address] [facility]

## 2022-10-25 NOTE — PROGRESS NOTES
Speech Language Pathology  Facility/Department: [facility] C4 PCU  Dysphagia Daily Treatment Note    NAME: Bull Ovalle  : 1945  MRN: [id number]    Recommendations:  Solid Consistency: Downgrade to IDDSI 4 Puree  Liquid Consistency: Continue IDDSI 2 Mildly (nectar) thick liquids   Medication: crushed in puree as able     *recs pending completion of MBSS. If able, pt would benefit from removal of NGT prior to completion of MBSS     Patient Diagnosis(es):   Patient Active Problem List    Diagnosis Date Noted    Closed traumatic subcoracoid dislocation of right shoulder joint 10/23/2022    Proteus mirabilis infection 10/18/2022    Staphylococcal pneumonia ([address] 10/18/2022    Pyuria 10/17/2022    Aspiration into airway 10/17/2022    Complete heart block ([address]     Systolic heart failure ([address] 10/16/2022    LBBB (left bundle branch block) 10/16/2022    Non-ischemic cardiomyopathy ([address] 10/16/2022    Symptomatic bradycardia 10/16/2022    AUSTIN (acute kidney injury) ([address] 10/16/2022    Pulmonary infiltrates 10/16/2022    Shock circulatory ([address] 10/16/2022    Normocytic hypochromic anemia 10/16/2022    Observed seizure-like activity ([address] 10/16/2022    Acute respiratory failure with hypoxia ([address] 10/16/2022    Dementia ([address] 10/15/2022    Hypercholesteremia 10/12/2022    New onset seizure ([address] 10/12/2022    Other specified hypothyroidism 10/12/2022    Paroxysmal atrial fibrillation ([address] 10/12/2022    Benign prostatic hyperplasia, presence of lower urinary tract symptoms unspecified 10/12/2022    Schizoaffective disorder ([address] 10/11/2022    Long term current use of amiodarone 2014    Atrial flutter ([address]     Cardiomyopathy (HCC)     Schizophrenia (HCC)     CKD (chronic kidney disease)     CHF (congestive heart failure) (HCC)     PVC's (premature ventricular contractions)      Allergies:    Allergies   Allergen Reactions    Penicillins      Subjective: 78year old male admitted on 10/15/22 with \"bradycardia. \" Pt evaluated by SLP on 10/21/22 and SLP recommended NPO. Pt has NG tube in right nare throughout follow-up. Pt alert and cooperative, upright in bed for evaluation. Pain: denies    Current Diet: ADULT TUBE FEEDING; Nasogastric; Standard with Fiber; Continuous; 10; Yes; 10; Q 4 hours; 60; 60; Q 4 hours  ADULT DIET; Dysphagia - Minced and Moist; Mildly Thick (Nectar)    Diet Tolerance:  Patient grossly tolerating current diet level without signs/symptoms of penetration / aspiration. RN reports wet vocal quality. Dysphagia Treatment and Impressions:  RN ok'd entry of SLP, reports that pt has had increased wet vocal quality. RN also reported it \"looks hard to swallow\" for pt. Pt alert and cooperative, agreeable to PO trials. Pt upright in bed. Respiratory Status: SpO2% of 97 on 2L O2 via NC, RR of 20. Pt answers questions from SLP but speech is difficulty to understand. RN reports that this is baseline for this pt. NGT in place. PO trials:  IDDSI 2 Mildly (nectar) thick liquid - via tsp - Suspect delayed trigger of pharyngeal swallow and reduced laryngeal elevation upon palpation of anterior neck. Pt accepting of ~6oz of NTL - grossly tolerating, but did have occ wet vocal quality and 1x post-swallow cough. IDDSI 4 Pureed solids: adequate A-P transit and oral clearance. No overt s/s of aspiration. IDDSI 5 Minced and Moist Solids: Prolonged and slow mastication. Immediate post-swallow cough x1. Pt reports globus sensation. Pt appeared to have effortful swallows at times. Of note, pt occ swallowing with mouth opened - cued to form labial seal around spoon and keep mouth closed and swallow. Pt able to follow commands to complete. Pt with concerning chest x-ray from 10/23 - \"no significant change in bibasilar airspace opacities worse on the right due in part to passive atelectasis given at least trace bilateral pleural effusions.  Superimposed pneumonia and aspiration or aspiration is suspected given comparison CT appearance\"  Thus, pt will benefit from a MBSS to further assess pharyngeal phase of swallow. Edu re: safe swallow strategies - small bites/sips, slow rate, alternate liquids and solids, fully upright. Pt verbalized understanding, but will benefit from ongoing edu and assistance with meals to implement. SLP recommends downgrade to pureed solids, cont NTL via tsp pending MBSS. Recommend MBSS to further assess pharyngeal phase of swallow. Diet orders changed in Epic. RN made aware of recommendations, order placed by MD. SLP to continue to follow. Dysphagia Goals:  Short Term Goals:  Timeframe for Short Term Goals: (5 days 10/26/2022)  Goal 1: The patient will tolerate recommended diet with no clinical s/s of aspiration 5/5.   10/25/2022 : Goal addressed, see above. Ongoing, progressing. Goal 2: The patient will tolerate therapeutic diet upgrade trials with no clinical s/s of aspiration 5/5.   10/25/2022 : Goal addressed, see above. Ongoing, progressing. Goal 3: The patient/caregiver will demonstrate understanding of compensatory swallow strategies, for improved swallow safety. 10/25/2022 : Goal addressed, see above. Ongoing, progressing. Goal 4: The patient will tolerate repeat BSE when able for ongoing assessment. Goal met 10/22 - recommended a diet    Goal 5: The patient will tolerate instrumental assessment when able , if appropriate. 10/25/2022 : Goal addressed, see above. Ongoing, progressing. Long Term Goals:   Timeframe for Long Term Goals: (7 days 10/28/2022)  Goal 1: The patient will tolerate least restrictive diet with no clinical s/s of aspiration or worsening respiratory/pulmonary status. 10/25/2022 : Goal addressed, see above. Ongoing, progressing.      Recommendations:  Solid Consistency: Downgrade to IDDSI 4 Puree  Liquid Consistency: Continue IDDSI 2 Mildly (nectar) thick liquids   Medication: crushed in puree as able   *recs pending completion of MBSS     Patient/Family/Caregiver Education: SLP reviewed recommendations and POC, edu re: safe swallow strategies. Pt will benefit from ongoing edu. Compensatory Strategies: Sit up for all meals and thereafter for 30 minutes, Eat with small bites (1/2 tsp; 1 tsp), Drink from a cup only with small sips, and No straws, alternate liquids and solids, fully upright. Plan:    Continued Dysphagia treatment with goals per plan of care. Discharge Recommendations: defer to PT/OT    If pt discharges from hospital prior to Speech/Swallowing discharge, this note serves as tx and discharge summary.      Total Treatment Time / Charges     Time in Time out Total Time / units   Cognitive Tx         Speech Tx      Dysphagia Tx 0853 0915 22 min / 1 unit     Signature:  Ron Lane [id number] [id number]  Speech Language Pathologist

## 2022-10-25 NOTE — PROGRESS NOTES
Hospitalist Progress Note      PCP: [facility]    Date of Admission: 10/15/2022    Chief Complaint: respiratory failure s/p intubation. HPI   78 y.o. male. Patient has a h/o schizoaffective disorder. He lives in [place], [place]. He receives most of his medical care through the [place] in [place]. He was initially admitted to [facility] [place] [address] in [place], [place] for hallucinations and impulsive behavior. He was there for ten days then transferred to the [facility] geriatric inpatient psychiatry unit on 10/11. Around 9pm on 10/15 a code blue was called because the patient briefly went unresponsive. He had been bradycardic all day  He had been taking metoprolol chronically but this had been held throughout the day because of his bradycardia. His nurse was trying to check his vitals again as it appeared he was breathing abnormally. While vital signs were being checked the patient lost consciousness briefly (less than a minute). He awoke but was markedly bradycardic. He was sent to the ER where he was found to be hypothermic, hypotensive, and bradycardic to the 20s in complete heart block. He was transferred to [facility] for urgent transvenous pacing lead placement. Patient had a h/o chronic systolic HF, thought to be nonischemic, and paroxysmal afib. The patient required sedation during temporary pacing lead placement and then became agitated thereafter. He required restraints and was given IM geodon, so was not meaningfully interactive or able to provide any history. According to Dr. Zenadia Harrell notes the patient had already been difficult to understand at baseline. Subjective:  Patient resting in bed, no voiced complaints, poor historian. Attempted glynn removal, retention reported last evening glynn reinserted.       Medications:  Reviewed    Infusion Medications    dextrose       Scheduled Medications    OLANZapine zydis  20 mg Oral Nightly metoprolol tartrate  12.5 mg Oral BID    docusate  100 mg Oral Daily    levothyroxine  125 mcg Oral Daily    atorvastatin  20 mg Oral Nightly    heparin (porcine)  5,000 Units SubCUTAneous 3 times per day    pantoprazole  40 mg IntraVENous Daily    aspirin  81 mg Oral Daily     PRN Meds: glucose, dextrose bolus **OR** dextrose bolus, glucagon (rDNA), dextrose, magnesium sulfate, potassium chloride **OR** potassium chloride, ondansetron, acetaminophen **OR** acetaminophen, ziprasidone, melatonin, perflutren lipid microspheres      Intake/Output Summary (Last 24 hours) at 10/25/2022 1109  Last data filed at 10/25/2022 0945  Gross per 24 hour   Intake 1428 ml   Output 700 ml   Net 728 ml         Physical Exam Performed:    /61   Pulse 82   Temp 98.2 °F (36.8 °C) (Oral)   Resp 18   Ht 5' 8\" (1.727 m)   Wt 191 lb 9.6 oz (86.9 kg)   SpO2 97%   BMI 29.13 kg/m²     General appearance: Alert, gen weak, NAD  HEENT: Pupils equal, round, and reactive to light. Conjunctivae/corneas clear. Neck: Supple, with full range of motion. No jugular venous distention. Trachea midline. Respiratory:  Normal respiratory effort. Diminished BS  Cardiovascular: Regular rate and rhythm with normal S1/S2 without murmurs, rubs or gallops. Abdomen: Soft, non-tender, non-distended with normal bowel sounds. Musculoskeletal: left hand edema  Skin: Skin color-pale  Neurologic:  Neurovascularly intact without any focal sensory/motor deficits.  Cranial nerves: II-XII intact, grossly non-focal.  Psychiatric: Alert, limited insight  Capillary Refill: Brisk, 3 seconds, normal   Peripheral Pulses: +2 palpable, equal bilaterally       Labs:   Recent Labs     10/23/22  0424 10/24/22  0451 10/25/22  0453   WBC 11.4* 11.7* 10.4   HGB 9.8* 8.6* 8.9*   HCT 29.8* 27.0* 26.4*    263 294       Recent Labs     10/23/22  0424 10/24/22  0451 10/25/22  0453    139 141   K 4.1 4.4 4.4    102 101   CO2 28 30 30   BUN 22* 25* 25* CREATININE 0.8 0.9 0.8   CALCIUM 8.6 8.3 8.4       Recent Labs     10/23/22  0424 10/24/22  0451 10/25/22  0453   AST 45* 43* 53*   ALT 40 36 41*   BILITOT 0.3 0.4 0.5   ALKPHOS 119 102 94       No results for input(s): INR in the last 72 hours. No results for input(s): [place] in the last 72 hours. Urinalysis:      Lab Results   Component Value Date/Time    NITRU POSITIVE 10/17/2022 04:15 AM    WBCUA 10-20 10/17/2022 04:15 AM    BACTERIA 2+ 10/17/2022 04:15 AM    RBCUA 21-50 10/17/2022 04:15 AM    BLOODU LARGE 10/17/2022 04:15 AM    SPECGRAV 1.010 10/17/2022 04:15 AM    GLUCOSEU Negative 10/17/2022 04:15 AM       Radiology:  XR SHOULDER RIGHT (MIN 2 VIEWS)   Final Result   Anterior, subcoracoid dislocation of the shoulder. Suspected multiple intra-articular calcifications. 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 XR CHEST PORTABLE   Final Result   1. Retraction of the endotracheal tube into high normal position. 2. No significant change in bibasilar airspace opacities worse on the right   due in part to passive atelectasis given at least trace bilateral pleural   effusions. Superimposed pneumonia and aspiration or aspiration is suspected   given comparison CT appearance. 3. Pulmonary vascular congestion and at least borderline cardiomegaly. 4. Incomplete evaluation of apparent right glenohumeral joint dislocation. 5. Age-indeterminate left humeral head fracture. XR ABDOMEN (KUB) (SINGLE AP VIEW)   Final Result   Distal tip of nasogastric tube is visualized in the region of the proximal   aspect of the stomach as described above. XR ABDOMEN (KUB) (SINGLE AP VIEW)   Final Result   Nasogastric tube projects in normal, intragastric position. Right basilar consolidation.          CT ABDOMEN PELVIS WO CONTRAST Additional Contrast? None   Final Result   Elevation of the right hemidiaphragm with right middle lobe and right lower   lobe consolidation/atelectasis. This could represent pneumonia. Small right   pleural effusion. Small left pleural effusion with some compressive   atelectasis of the left lower lobe. Moderate cardiomegaly with coronary artery calcifications. Calcified gallstones in the gallbladder. The gallbladder is contracted with   apparent mural calcifications, in keeping with a porcelain gallbladder. This   is associated with increased incidence of gallbladder carcinoma. Surgical   consultation recommended. Moderate amount of fecal material in the left colon, with mild rectal fecal   impaction. Findings are in keeping with constipation. 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 XR CHEST PORTABLE   Final Result   Supportive tubing projects in normal position. Low lung volumes. Right basilar opacity, indeterminate for atelectasis or pneumonia/aspiration   pneumonitis. XR CHEST PORTABLE   Final Result   Endotracheal tube tip is 3 cm above the ade. Enteric tube tip is within   the stomach with side port just below the gastroesophageal junction. XR ABDOMEN (KUB) (SINGLE AP VIEW)   Final Result   Endotracheal tube tip is 3 cm above the ade. Enteric tube tip is within   the stomach with side port just below the gastroesophageal junction.          FL MODIFIED BARIUM SWALLOW W VIDEO    (Results Pending)           Assessment/Plan:    Active Hospital Problems    Diagnosis     Closed traumatic subcoracoid dislocation of right shoulder joint [S43.014A]      Priority: Medium    Proteus mirabilis infection [A49.8]      Priority: Medium    Staphylococcal pneumonia ([address] [J15.20]      Priority: Medium    Pyuria [R82.81]      Priority: Medium    Aspiration into airway [T17.908A]      Priority: Medium    Complete heart block ([address] [I44.2]      Priority: Medium    Systolic heart failure ([address] [I50.20]      Priority: Medium    Symptomatic bradycardia [R00.1] Priority: Medium    AUSTIN (acute kidney injury) ([facility] [N17.9]      Priority: Medium    Pulmonary infiltrates [R91.8]      Priority: Medium    Shock circulatory ([facility] [R57.9]      Priority: Medium    Normocytic hypochromic anemia [D50.9]      Priority: Medium    Observed seizure-like activity ([facility] [R56.9]      Priority: Medium    Acute respiratory failure with hypoxia (HCC) [J96.01]      Priority: Medium    Paroxysmal atrial fibrillation (HCC) [I48.0]      Priority: Medium    Schizoaffective disorder ([facility] [F25.9]      Priority: Medium    Cardiomyopathy ([facility] [I42.9]        Right shoulder dislocation  Incidentally chest x-ray showed right shoulder dislocation. Confirmed by right shoulder x-ray. Orthopedic surgeon consulted    Acute hypoxemic respiratory failure due to pneumonia and acute on chronic systolic heart failure: Patient required intubation and mechanical ventilation. required mechanical ventilator   S/p extubation on 10/20/22  Now on 2 L via NC. Continue speech and swallow therapy: Solid Consistency: advance to MINCED/MOIST  Liquid Consistency: advance to NECTAR THICK  Medication: crushed in puree as able versus via NG tube  SLP recommending MBSS  PT OT consult   Hold TF, tolerating diet  Resp cx:staph aureus-completed 7 days abx    Pneumonia  Initially treated with IV cefepime and now on  IV ceftriaxone-completed    Bradycardia, Complete heart block with junctional escape on admission s/p temporary venous pacemaker   Off dopamine drip. Cardiology on board. Abnormal LFT  improving    AUSTIN  Monitor GFR, avoid nephrotoxic agent, dc glynn    Hypothermia  Resolved    UTI-proteus mirabilis, completed Rocephin     Acute on chronic Systolic HF  -  TTE 0/4/06:  LVEF = 40-45%, New EF 35%  C/w current meds, I&O, HF orderset, BNP 5,077     Schizoaffective D/O  -  Continue home olanzapine 20mg nightly, trazodone 50mg nightly.  Psych consulted     DVT Prophylaxis: heparin   Diet: ADULT TUBE FEEDING; Nasogastric; Standard with Fiber; Continuous; 10; Yes; 10; Q 4 hours; 60; 60; Q 4 hours  ADULT DIET;  Dysphagia - Pureed; Mildly Thick (Nectar)  Code Status: Full Code  PT/OT Eval Status: yes    Dispo -skilled nursing facility in 1 to 2 days        CHRISTIAN Nam - CNP

## 2022-10-25 NOTE — PROGRESS NOTES
Occupational Therapy  Facility/Department: [facility] C4 PCU  Daily Treatment Note  Co-tx collaboration this date to safely meet goals and will have better occupational performance outcomes with in a co-treatment than 1:1 session. NAME: Bull Ovalle  : 1945  MRN: [id number]    Date of Service: 10/25/2022    Discharge Recommendations:  Subacute/Skilled Nursing Facility  OT Equipment Recommendations  Equipment Needed: No  Other: defer to d/c facility      Patient Diagnosis(es): The primary encounter diagnosis was Bradycardia. Diagnoses of Complete heart block (HCC) and PVC's (premature ventricular contractions) were also pertinent to this visit. Assessment    Assessment: Bull Ovalle is progressing in therapy slowly, currently limited by multiple medical complication resulting in decreased functional strength and balance. Demo'd poor safety awareness, requiring consistent cueing to maintain midline seated and in Florette Duck. Functional Mobility: grossly Mod Ax1, Max Ax1 for supine <> sitting, and Max Ax2-3 for sit to stand up to [place]; d/t transport arrival pt transferred bed > chair >bed during session  ADL: dep for sock donning EOB, pt managed bringing open cup to mouth with A for safety and flow control, dep for toileting via glynn  TA: sitting EOB for ~5 minutes, semi  Fl[place] for ~3 minutes; Mod Ax2 for balance  Activity was tolerated fairly well, pt required increased time to recover, cues for PLB with exertion. Continue OT POC to address performance deficits in ADLs and functional mobility.      AM-PAC Score  AM-PAC Inpatient Daily Activity Raw Score: 9 (10/25/22 1602)  AM-PAC Inpatient ADL T-Scale Score : 25.33 (10/25/22 1602)  ADL Inpatient CMS 0-100% Score: 79.59 (10/25/22 1602)  ADL Inpatient CMS G-Code Modifier : CL (10/25/22 1602)     Activity Tolerance: Patient tolerated treatment well;Treatment limited secondary to decreased cognition  Discharge Recommendations: Subacute/Skilled Nursing Facility  Equipment Needed: No  Other: defer to d/c facility      Plan   PT Plan of Care: 5 times/week;4 times/week;3 times/week  Occupational Therapy Plan  Times Per Week: 3-5  Current Treatment Recommendations: ROM;Balance training;Functional mobility training; Endurance training;Pain management; Safety education & training;Patient/Caregiver education & training;Equipment evaluation, education, & procurement;Positioning;Self-Care / ADL; Coordination training     Restrictions  Restrictions/Precautions  Restrictions/Precautions: Fall Risk;Up as Tolerated; Bed Alarm  Position Activity Restriction  Other position/activity restrictions: tele, NG tube, 2L O2    Subjective   Subjective  Subjective: pt seen for OT/PT co tx and agreeable; RN cleared pt for therapy  Pain: pt stated generalized pain in body but did not specify number nor quality, able to participate  Orientation  Overall Orientation Status: Impaired  Orientation Level: Oriented to place;Oriented to person;Disoriented to time;Disoriented to situation  Pain: no direct c/o pain  Cognition  Overall Cognitive Status: Exceptions  Arousal/Alertness: Delayed responses to stimuli  Following Commands:  Follows one step commands with increased time  Attention Span: Difficulty dividing attention  Memory: Decreased recall of precautions;Decreased recall of recent events  Safety Judgement: Decreased awareness of need for assistance;Decreased awareness of need for safety  Problem Solving: Assistance required to correct errors made  Insights: Decreased awareness of deficits  Initiation: Requires cues for all  Sequencing: Requires cues for all        Objective    Vitals  Vitals  Heart Rate: 87  Heart Rate Source: Monitor  BP: 116/69  BP Location: Left upper arm  BP Method: Automatic  Patient Position: Semi fowlers  MAP (Calculated): 84.67  SpO2: 96 %  O2 Device: Nasal cannula (2L)    Bed Mobility Training  Bed Mobility Training: Yes  Overall Level of Assistance: Assist X2;Maximum assistance  Interventions: Manual cues;Demonstration; Safety awareness training; Tactile cues; Verbal cues; Visual cues; Weight shifting training/pressure relief  Rolling: Maximum assistance;Assist X2  Supine to Sit: Assist X2;Maximum assistance  Sit to Supine: Maximum assistance; Moderate assistance;Assist X2 (Max-A x1 and mod-A x1)  Scooting: Assist X2;Maximum assistance  Balance  Sitting: Impaired  Sitting - Static: Poor (constant support)  Sitting - Dynamic: Poor (constant support)  Standing: Impaired  Standing - Static: Constant support  Transfer Training  Transfer Training: Yes  Overall Level of Assistance: Maximum assistance; Other (comment) (for transfering required max A x3)  Interventions: Demonstration;Manual cues; Safety awareness training; Tactile cues; Verbal cues; Visual cues  Sit to Stand: Maximum assistance; Other (comment) (Max-A x3)  Stand to Sit: Maximum assistance (Max-A x3)  Bed to Chair: Maximum assistance (Max-A x3)  Gait Training  Gait Training: No  Wheelchair Management  Wheelchair Management: No    ADL  Feeding:  Thickened liquids  Feeding Skilled Clinical Factors: Nectar thick, NG tube  LE Dressing: Dependent/Total  LE Dressing Skilled Clinical Factors: donning socks while sitting EOB  Toileting: Dependent/Total  Toileting Skilled Clinical Factors: renard  Additional Comments: Pt declining further ADLs    OT Exercises  Static Sitting Balance Exercises: sitting EOB for ~5 minutes with consistent verbal and tactile cues from student PT posteriorly and from OT anteriorly to maintain midline, pt cont to attempt to support self on L elbow  Static Standing Balance Exercises: semi stance in john stedy for ~3 minutes to assess tolerance for OOB activity/up in chair, required consistent Mod Ax2 to increase trunk extension  Postural Correction Exercises: verbal cues, tactile cues at anterior shoulder  Breathing Techniques: PLB during exertion, pt with use of accessory muscles for breathing during transfers     Safety Devices  Type of Devices: All jv prominences offloaded;Call light within reach;Gait belt;Left in bed;Patient at risk for falls;Nurse notified (transport arrived taking pt to [facility])  Restraints  Restraints Initially in Place: No     Patient Education  Education Given To: Patient  Education Provided: Role of Therapy;Plan of Care;Transfer Training;Orientation;Precautions; Equipment  Education Provided Comments: disease specific: benefits of OOB activity  Education Method: Verbal  Barriers to Learning: Cognition  Education Outcome: Verbalized understanding    Goals  Short Term Goals  Time Frame for Short Term Goals: 1 week, 10/28/22- all goals ongoing 10/25  Short Term Goal 1: Pt will complete bed mobilty with mod Ax2 by 10/26  Short Term Goal 2: Pt will complete 10 reps BUE ex with SBA while seated EOB  Short Term Goal 3: Pt will require CGA to sit EOB and complete ADLs  Short Term Goal 4: Pt will complete sit<>stand transfer with mod A  Patient Goals   Patient goals : \"to not be in pain\"       Therapy Time   Individual Concurrent Group Co-treatment   Time In 1350         Time Out 1430         Minutes 40         Timed Code Treatment Minutes: 40 Minutes     If pt is unable to be seen after this session, please let this note serve as discharge summary. Please see case management note for discharge disposition. Thank you.    Benjamin Harvey OT

## 2022-10-25 NOTE — CONSULTS
Full note dictated. Met with family and spoke to sister Muriel with whom I have worked. Dx:  schizoaffective Disorder         Dementia    Rx:  I will evaluate again tomorrow but don't think he will need to return to [facility]. He is cooperating with treatment, seems to be sleeping and denies hallucinating (although I can only understand 1/3 to 1/2 of what he says). Spoke to [employer] regarding disposition.       Sasha Garcia MD

## 2022-10-26 PROBLEM — I49.3 PVC (PREMATURE VENTRICULAR CONTRACTION): Status: ACTIVE | Noted: 2022-10-26

## 2022-10-26 LAB
A/G RATIO: 0.7 (ref 1.1–2.2)
ALBUMIN SERPL-MCNC: 2.7 G/DL (ref 3.4–5)
ALP BLD-CCNC: 92 U/L (ref 40–129)
ALT SERPL-CCNC: 52 U/L (ref 10–40)
ANION GAP SERPL CALCULATED.3IONS-SCNC: 8 MMOL/L (ref 3–16)
AST SERPL-CCNC: 68 U/L (ref 15–37)
BASOPHILS ABSOLUTE: 0 K/UL (ref 0–0.2)
BASOPHILS RELATIVE PERCENT: 0.2 %
BILIRUB SERPL-MCNC: 0.3 MG/DL (ref 0–1)
BUN BLDV-MCNC: 23 MG/DL (ref 7–20)
CALCIUM SERPL-MCNC: 8.6 MG/DL (ref 8.3–10.6)
CHLORIDE BLD-SCNC: 98 MMOL/L (ref 99–110)
CO2: 31 MMOL/L (ref 21–32)
CREAT SERPL-MCNC: 0.8 MG/DL (ref 0.8–1.3)
EOSINOPHILS ABSOLUTE: 0.3 K/UL (ref 0–0.6)
EOSINOPHILS RELATIVE PERCENT: 2.1 %
GFR SERPL CREATININE-BSD FRML MDRD: >60 ML/MIN/{1.73_M2}
GLUCOSE BLD-MCNC: 132 MG/DL (ref 70–99)
GLUCOSE BLD-MCNC: 140 MG/DL (ref 70–99)
GLUCOSE BLD-MCNC: 142 MG/DL (ref 70–99)
GLUCOSE BLD-MCNC: 160 MG/DL (ref 70–99)
GLUCOSE BLD-MCNC: 175 MG/DL (ref 70–99)
HCT VFR BLD CALC: 26.4 % (ref 40.5–52.5)
HEMOGLOBIN: 8.7 G/DL (ref 13.5–17.5)
LYMPHOCYTES ABSOLUTE: 0.8 K/UL (ref 1–5.1)
LYMPHOCYTES RELATIVE PERCENT: 6 %
MAGNESIUM: 1.8 MG/DL (ref 1.8–2.4)
MCH RBC QN AUTO: 29.3 PG (ref 26–34)
MCHC RBC AUTO-ENTMCNC: 32.9 G/DL (ref 31–36)
MCV RBC AUTO: 89 FL (ref 80–100)
MONOCYTES ABSOLUTE: 1 K/UL (ref 0–1.3)
MONOCYTES RELATIVE PERCENT: 8 %
NEUTROPHILS ABSOLUTE: 10.9 K/UL (ref 1.7–7.7)
NEUTROPHILS RELATIVE PERCENT: 83.7 %
PDW BLD-RTO: 16.3 % (ref 12.4–15.4)
PERFORMED ON: ABNORMAL
PLATELET # BLD: 334 K/UL (ref 135–450)
PMV BLD AUTO: 7.7 FL (ref 5–10.5)
POTASSIUM SERPL-SCNC: 4 MMOL/L (ref 3.5–5.1)
RBC # BLD: 2.97 M/UL (ref 4.2–5.9)
SODIUM BLD-SCNC: 137 MMOL/L (ref 136–145)
TOTAL PROTEIN: 6.4 G/DL (ref 6.4–8.2)
WBC # BLD: 13 K/UL (ref 4–11)

## 2022-10-26 PROCEDURE — 6370000000 HC RX 637 (ALT 250 FOR IP)

## 2022-10-26 PROCEDURE — C9113 INJ PANTOPRAZOLE SODIUM, VIA: HCPCS | Performed by: INTERNAL MEDICINE

## 2022-10-26 PROCEDURE — 6370000000 HC RX 637 (ALT 250 FOR IP): Performed by: INTERNAL MEDICINE

## 2022-10-26 PROCEDURE — 99233 SBSQ HOSP IP/OBS HIGH 50: CPT | Performed by: PSYCHIATRY & NEUROLOGY

## 2022-10-26 PROCEDURE — 97530 THERAPEUTIC ACTIVITIES: CPT

## 2022-10-26 PROCEDURE — 36415 COLL VENOUS BLD VENIPUNCTURE: CPT

## 2022-10-26 PROCEDURE — 85025 COMPLETE CBC W/AUTO DIFF WBC: CPT

## 2022-10-26 PROCEDURE — 2060000000 HC ICU INTERMEDIATE R&B

## 2022-10-26 PROCEDURE — 92526 ORAL FUNCTION THERAPY: CPT

## 2022-10-26 PROCEDURE — 80053 COMPREHEN METABOLIC PANEL: CPT

## 2022-10-26 PROCEDURE — 6360000002 HC RX W HCPCS: Performed by: INTERNAL MEDICINE

## 2022-10-26 PROCEDURE — 83735 ASSAY OF MAGNESIUM: CPT

## 2022-10-26 PROCEDURE — 2700000000 HC OXYGEN THERAPY PER DAY

## 2022-10-26 PROCEDURE — 97535 SELF CARE MNGMENT TRAINING: CPT

## 2022-10-26 PROCEDURE — 99233 SBSQ HOSP IP/OBS HIGH 50: CPT

## 2022-10-26 PROCEDURE — 94761 N-INVAS EAR/PLS OXIMETRY MLT: CPT

## 2022-10-26 RX ORDER — LISINOPRIL 5 MG/1
5 TABLET ORAL DAILY
Status: DISCONTINUED | OUTPATIENT
Start: 2022-10-26 | End: 2022-10-28 | Stop reason: HOSPADM

## 2022-10-26 RX ORDER — FERROUS SULFATE 325(65) MG
325 TABLET ORAL
Status: DISCONTINUED | OUTPATIENT
Start: 2022-10-27 | End: 2022-10-28 | Stop reason: HOSPADM

## 2022-10-26 RX ADMIN — METOPROLOL TARTRATE 25 MG: 25 TABLET, FILM COATED ORAL at 08:29

## 2022-10-26 RX ADMIN — LEVOTHYROXINE SODIUM 125 MCG: 0.12 TABLET ORAL at 05:16

## 2022-10-26 RX ADMIN — HEPARIN SODIUM 5000 UNITS: 5000 INJECTION INTRAVENOUS; SUBCUTANEOUS at 15:08

## 2022-10-26 RX ADMIN — Medication 3 MG: at 20:15

## 2022-10-26 RX ADMIN — HEPARIN SODIUM 5000 UNITS: 5000 INJECTION INTRAVENOUS; SUBCUTANEOUS at 22:53

## 2022-10-26 RX ADMIN — ATORVASTATIN CALCIUM 20 MG: 10 TABLET, FILM COATED ORAL at 20:15

## 2022-10-26 RX ADMIN — OLANZAPINE 20 MG: 5 TABLET, ORALLY DISINTEGRATING ORAL at 20:15

## 2022-10-26 RX ADMIN — METOPROLOL TARTRATE 25 MG: 25 TABLET, FILM COATED ORAL at 20:15

## 2022-10-26 RX ADMIN — HEPARIN SODIUM 5000 UNITS: 5000 INJECTION INTRAVENOUS; SUBCUTANEOUS at 05:16

## 2022-10-26 RX ADMIN — ASPIRIN 81 MG 81 MG: 81 TABLET ORAL at 08:29

## 2022-10-26 RX ADMIN — LISINOPRIL 5 MG: 5 TABLET ORAL at 15:09

## 2022-10-26 RX ADMIN — PANTOPRAZOLE SODIUM 40 MG: 40 INJECTION, POWDER, FOR SOLUTION INTRAVENOUS at 08:29

## 2022-10-26 ASSESSMENT — ENCOUNTER SYMPTOMS: TACHYPNEA: 1

## 2022-10-26 ASSESSMENT — PAIN SCALES - GENERAL: PAINLEVEL_OUTOF10: 1

## 2022-10-26 NOTE — PROGRESS NOTES
Department of Psychiatry  Consultant Progress Note  Chief Complaint: follow-up schizoaffective. Pt awake and alert. Still difficult to interpret speech. States he didn't sleep but overnight report suggests he did. No behavioral problems. Cooperating with care. Patient's chart was reviewed and collaborated with  about the treatment plan. .  SUBJECTIVE:    Patient is feeling better. Suicidal ideation: denies suicidal ideation  Patient denies    ROS: Patient has new complaints: no  Sleeping adequately:  Yes  Appetite adequate: yes  Attending groups: N/A  Visitors:no    OBJECTIVE    Physical  VITALS:  BP (!) 160/81   Pulse 97   Temp 99.5 °F (37.5 °C) (Oral)   Resp 16   Ht 5' 8\" (1.727 m)   Wt 200 lb 3.2 oz (90.8 kg)   SpO2 96%   BMI 30.44 kg/m²   Patients appearance hospital attire and lying in bed. Musculoskeletal  Patient gait not teested   STRENGTH: proximal weakness was noted TONE normal    Mental Status Examination:   No current loose associations  ConcentrationLimited   Thoughts are difficult to assess because of speech but seems organized  Insight and Judgement impaired insight  Knowledge: adequate  Language: 1 - mild to moderate aphasia; some obvious loss of fluency or facility of comprehension without significant limitation on ideas expressed or form of expression. Reduction of speech and/or comprehension, however, makes conversation about provided materials difficult or impossible. For example, in conversation about provided materials, examiner can identify picture or naming card content from patient's response. Speech:  difficult to understand   Mood within normal limits, affect congruent with mood  Orientation: oriented to person, place, time/date, and situation   Hallucinations Absent   Thought Processes: Goal-Directed  Memory hard to assess  suicidal ideations no plan  Homicidal ideations no           Data  Labs:   No results displayed because visit has over 200 results. Medications  Current Facility-Administered Medications: metoprolol tartrate (LOPRESSOR) tablet 25 mg, 25 mg, Oral, BID  glucose chewable tablet 16 g, 4 tablet, Oral, PRN  dextrose bolus 10% 125 mL, 125 mL, IntraVENous, PRN **OR** dextrose bolus 10% 250 mL, 250 mL, IntraVENous, PRN  glucagon (rDNA) injection 1 mg, 1 mg, SubCUTAneous, PRN  dextrose 10 % infusion, , IntraVENous, Continuous PRN  OLANZapine zydis (ZYPREXA) disintegrating tablet 20 mg, 20 mg, Oral, Nightly  magnesium sulfate 1000 mg in dextrose 5% 100 mL IVPB, 1,000 mg, IntraVENous, PRN  docusate (COLACE) 50 MG/5ML liquid 100 mg, 100 mg, Oral, Daily  potassium chloride 20 mEq/50 mL IVPB (Central Line), 20 mEq, IntraVENous, PRN **OR** potassium chloride 10 mEq/100 mL IVPB (Peripheral Line), 10 mEq, IntraVENous, PRN  ondansetron (ZOFRAN) injection 4 mg, 4 mg, IntraVENous, Q6H PRN  acetaminophen (TYLENOL) tablet 650 mg, 650 mg, Oral, Q4H PRN **OR** acetaminophen (TYLENOL) suppository 650 mg, 650 mg, Rectal, Q4H PRN  ziprasidone (GEODON) injection 10 mg, 10 mg, IntraMUSCular, Q12H PRN  levothyroxine (SYNTHROID) tablet 125 mcg, 125 mcg, Oral, Daily  melatonin tablet 3 mg, 3 mg, Oral, Nightly PRN  atorvastatin (LIPITOR) tablet 20 mg, 20 mg, Oral, Nightly  heparin (porcine) injection 5,000 Units, 5,000 Units, SubCUTAneous, 3 times per day  pantoprazole (PROTONIX) injection 40 mg, 40 mg, IntraVENous, Daily  aspirin chewable tablet 81 mg, 81 mg, Oral, Daily  perflutren lipid microspheres (DEFINITY) injection 1.65 mg, 1.5 mL, IntraVENous, ONCE PRN    ASSESSMENT AND PLAN    PRIMARY PSYCH DIAGNOSIS: schizoaffective disorder    Principal Problem:    Symptomatic bradycardia  Active Problems:    Schizoaffective disorder (HCC)    Paroxysmal atrial fibrillation (HCC)    Complete heart block (HCC)    Systolic heart failure (HCC)    AUSTIN (acute kidney injury) (HCC)    Pulmonary infiltrates    Shock circulatory (HCC)    Normocytic hypochromic anemia    Observed seizure-like activity (HCC)    Acute respiratory failure with hypoxia (HCC)    Pyuria    Aspiration into airway    Proteus mirabilis infection    Staphylococcal pneumonia (HCC)    Closed traumatic subcoracoid dislocation of right shoulder joint    Cardiomyopathy ([address]  Resolved Problems:    Hypotension    Hyperkalemia    Hypoglycemia    Hypothermia    Hyponatremia       1. Patient s symptoms better. I think he is stable on current meds as best as I can tell. I think it is ok for him to transition to next level of care without returning to [facility]. 2.Probable discharge pending decision of LTACH vs SNF   3. Discharge planning is pending  4 Suicidal ideation is denies suicidal ideation  5 total time 40 minutes more than 50% was spent in direct time with the patient     Sina Loredo MD

## 2022-10-26 NOTE — PROGRESS NOTES
Speech Language Pathology  Facility/Department: [id number] [place] [address] PCU  Dysphagia Daily Treatment Note      Recommendations:  Diet recommendation: IDDSI 4 Puree Solids; IDDSI 0 Thin Liquids; Meds crushed in puree as able; assist feed  Risk management: upright for all intake, stay upright for at least 30 mins after intake, small bites/sips, distant supervision with intake, oral care 2-3x/day to reduce adverse affects in the event of aspiration, alternate bites/sips, slow rate of intake, general GERD precautions, general aspiration precautions, and hold PO and contact SLP if s/s of aspiration or worsening respiratory status develop.       NAME: Ammon Ribera  : 1945  MRN: [id number]    Patient Diagnosis(es):   Patient Active Problem List    Diagnosis Date Noted    PVC (premature ventricular contraction) 10/26/2022    Closed traumatic subcoracoid dislocation of right shoulder joint 10/23/2022    Proteus mirabilis infection 10/18/2022    Staphylococcal pneumonia ([address] 10/18/2022    Pyuria 10/17/2022    Aspiration into airway 10/17/2022    Complete heart block ([address]     Systolic heart failure ([address] 10/16/2022    LBBB (left bundle branch block) 10/16/2022    Non-ischemic cardiomyopathy ([address] 10/16/2022    Symptomatic bradycardia 10/16/2022    AUSTIN (acute kidney injury) ([address] 10/16/2022    Pulmonary infiltrates 10/16/2022    Shock circulatory ([address] 10/16/2022    Normocytic hypochromic anemia 10/16/2022    Observed seizure-like activity ([address] 10/16/2022    Acute respiratory failure with hypoxia ([address] 10/16/2022    Dementia ([address] 10/15/2022    Hypercholesteremia 10/12/2022    New onset seizure ([address] 10/12/2022    Other specified hypothyroidism 10/12/2022    Paroxysmal atrial fibrillation ([address] 10/12/2022    Benign prostatic hyperplasia, presence of lower urinary tract symptoms unspecified 10/12/2022    Schizoaffective disorder ([address] 10/11/2022    Long term current use of amiodarone 2014    Atrial flutter ([facility]     Cardiomyopathy [address]     Schizophrenia ([facility]     CKD (chronic kidney disease)     CHF (congestive heart failure) ([facility])     PVC's (premature ventricular contractions)      Allergies: Allergies   Allergen Reactions    Penicillins      Subjective: Pt seen upright in bed, alert and agreeable to therapy. RN OK'd SLP entry and therapy. Pt no longer with NG. Pain: denies    Current Diet: ADULT DIET; Dysphagia - Pureed  ADULT ORAL NUTRITION SUPPLEMENT; Lunch, Dinner; Frozen Oral Supplement    Diet Tolerance:  Patient grossly tolerating current diet level without signs/symptoms of penetration / aspiration per chart. Dysphagia Treatment and Impressions:  Respiratory Status: SpO2% of 95-97 on 1L O2 via NC, RR of 24/min. Pt attempts to participate in conversation with SLP, however, speech is often difficult to understand (baseline for pt per report). PO trials:  IDDSI 0 Thin liquid - attempted cup sip trials -- pt with open mouth posture, unable to form adequate labial seal on cup edge. Thin liquid trialed via straw (*instance of shallow penetration observed during the swallow during MBSS 10/26 that completely cleared). Pt observed with consecutive sips of TL via straw, 4 sets. Immediate strong cough in 1/4 sets. IDDSI 4 Pureed solids: x3 tsp jello. Pt declined further trials. No clinical s/s of aspiration. Adequate A-P bolus transit & oral clearance. SLP cued use of liquid wash after all solid PO trials in an effort to clear possible pharyngeal residue (*per MBSS). Pt declined further PO, requesting to lay back in bed. He required total feed assistance with all PO. Recommend continuing current pureed diet with thin liquids, crushable meds crushed in puree, assist feed, strict aspiration precautions. Dysphagia Goals:  Short Term Goals:  Timeframe for Short Term Goals: (5 days 10/26/2022)  Goal 1: The patient will tolerate recommended diet with no clinical s/s of aspiration 5/5. 10/26/2022 : Goal addressed, see above. Ongoing, progressing. Goal 2: The patient will tolerate therapeutic diet upgrade trials with no clinical s/s of aspiration 5/5.   10/26/2022 : Goal addressed, see above. Ongoing, progressing. Goal 3: The patient/caregiver will demonstrate understanding of compensatory swallow strategies, for improved swallow safety. 10/26/2022 : Goal addressed, see above. Ongoing, progressing. Goal met   MBSS completed 10/25     Long Term Goals:   Timeframe for Long Term Goals: (7 days 10/28/2022)  Goal 1: The patient will tolerate least restrictive diet with no clinical s/s of aspiration or worsening respiratory/pulmonary status. 10/26/2022 : Goal addressed, see above. Ongoing, progressing. Recommendations:  Diet recommendation: IDDSI 4 Puree Solids; IDDSI 0 Thin Liquids; Meds crushed in puree as able  Risk management: upright for all intake, stay upright for at least 30 mins after intake, small bites/sips, distant supervision with intake, oral care 2-3x/day to reduce adverse affects in the event of aspiration, alternate bites/sips, slow rate of intake, general GERD precautions, general aspiration precautions, and hold PO and contact SLP if s/s of aspiration or worsening respiratory status develop. Patient/Family/Caregiver Education:  SLP re: Role of ST, rationale for PO trials, MBSS results/recs. Pt does not demonstrate evidence of learning, would benefit from continued reinforcement and 1:1 supervision with meals. Compensatory Strategies:  HOB 90* and 30\" after meals; small bites/sips; alternate solids/liquids every 3-5 bites; oral care after every meal; assist feed    Plan:    Continued Dysphagia treatment with goals per plan of care. Discharge Recommendations: defer to PT/OT    If pt discharges from hospital prior to Speech/Swallowing discharge, this note serves as tx and discharge summary.      Total Treatment Time / Charges     Time in Time out Total Time / units   Cognitive Tx         Speech Tx      Dysphagia Tx 1355 1410 15 min / 1 unit     Signature:  Nora Livingston M.S. [facility]  Speech-language pathologist  [id number]

## 2022-10-26 NOTE — PROGRESS NOTES
[facility]     Electrophysiology                                     Progress Note    Admission date:  10/15/2022    Reason for follow up visit: CHB/AF    HPI/CC: Rock Coburn was admitted on 10/15/2022 from [facility]. He was a patient on the inpatient psychiatry unit. He was found to be hypothermic and bradycardic. EKG showed CHB with junctional escape rhythm. He was transferred to [facility] and had a temporary venous pacemaker placed. Dopamine was started. Dopamine has since been discontinued. On 10/20/2022 he was extubated and temporary pacing wire was pulled. On 10/21/2022 low dose metoprolol was restarted. Rhythm has been SR with PVC's, heart rates in the 80's. 1 NSVT event noted. Subjective: He is seen resting in bed with family at bedside. Denies chest pain, palpitations, shortness of breath, and dizziness. Vitals:  Blood pressure (!) 146/76, pulse 80, temperature 98 °F (36.7 °C), temperature source Oral, resp. rate 16, height 5' 8\" (1.727 m), weight 200 lb 3.2 oz (90.8 kg), SpO2 93 %.   Temp  Av.6 °F (37 °C)  Min: 97.6 °F (36.4 °C)  Max: 99.5 °F (37.5 °C)  Pulse  Av.1  Min: 80  Max: 97  BP  Min: 112/67  Max: 160/81  SpO2  Av.6 %  Min: 93 %  Max: 96 %    24 hour I/O    Intake/Output Summary (Last 24 hours) at 10/26/2022 1212  Last data filed at 10/26/2022 2992  Gross per 24 hour   Intake 2059 ml   Output 2150 ml   Net -91 ml       Current Facility-Administered Medications   Medication Dose Route Frequency Provider Last Rate Last Admin    [START ON 10/27/2022] ferrous sulfate (IRON 325) tablet 325 mg  325 mg Oral Daily with breakfast CHRISTIAN Clarke CNP        metoprolol tartrate (LOPRESSOR) tablet 25 mg  25 mg Oral BID Daniel OfficerCHRISTIAN - CNP   25 mg at 10/26/22 0829    glucose chewable tablet 16 g  4 tablet Oral PRN Nyasia Rosen MD        dextrose bolus 10% 125 mL  125 mL IntraVENous PRN Nyasia Rosen MD   Stopped at 10/21/22 1709    Or    dextrose bolus 10% 250 mL  250 mL IntraVENous PRN Meena Contreras MD        glucagon (rDNA) injection 1 mg  1 mg SubCUTAneous PRN Meena Contreras MD        dextrose 10 % infusion   IntraVENous Continuous PRN Meena Contreras MD        OLANZapine zydis (ZYPREXA) disintegrating tablet 20 mg  20 mg Oral Nightly Meena Contreras MD   20 mg at 10/25/22 2058    magnesium sulfate 1000 mg in dextrose 5% 100 mL IVPB  1,000 mg IntraVENous PRN Meena Contreras MD        docusate (COLACE) 50 MG/5ML liquid 100 mg  100 mg Oral Daily Luigi Hdz MD   100 mg at 10/24/22 0857    potassium chloride 20 mEq/50 mL IVPB (Central Line)  20 mEq IntraVENous PRN Richard Palacio MD        Or    potassium chloride 10 mEq/100 mL IVPB (Peripheral Line)  10 mEq IntraVENous PRN Richard Palacio MD        ondansetron [facility]) injection 4 mg  4 mg IntraVENous Q6H PRN Richard Palacio MD        acetaminophen (TYLENOL) tablet 650 mg  650 mg Oral Q4H PRN Richard Palacio MD   650 mg at 10/22/22 0718    Or    acetaminophen (TYLENOL) suppository 650 mg  650 mg Rectal Q4H PRN Richard Palacio MD   650 mg at 10/24/22 2108    ziprasidone (GEODON) injection 10 mg  10 mg IntraMUSCular Q12H PRN Richard Palacio MD   10 mg at 10/23/22 1334    levothyroxine (SYNTHROID) tablet 125 mcg  125 mcg Oral Daily Richard Palacio MD   125 mcg at 10/26/22 0516    melatonin tablet 3 mg  3 mg Oral Nightly PRN Richard Palacio MD   3 mg at 10/21/22 2037    atorvastatin (LIPITOR) tablet 20 mg  20 mg Oral Nightly Richard Palacio MD   20 mg at 10/25/22 2056    heparin (porcine) injection 5,000 Units  5,000 Units SubCUTAneous 3 times per day Meena Contreras MD   5,000 Units at 10/26/22 0516    pantoprazole (PROTONIX) injection 40 mg  40 mg IntraVENous Daily Meena Contreras MD   40 mg at 10/26/22 1001    aspirin chewable tablet 81 mg  81 mg Oral Daily Tariq Haddox, DO   81 mg at 10/26/22 0829    perflutren lipid microspheres (DEFINITY) injection 1.65 mg  1.5 mL IntraVENous ONCE PRN Tariq Junior, DO Objective:     Telemetry monitor: SR with PVC's, NSVT    Physical Exam:  Constitutional and general appearance: alert, cooperative, no distress, and appears stated age  [de-identified]: PERRL, no cervical lymphadenopathy. No masses palpable. Normal oral mucosa  Respiratory:  Normal excursion and expansion without use of accessory muscles  Resp auscultation: clear anteriorly   Cardiovascular: The apical impulse is not displaced  Heart tones are crisp and normal. regular S1 and S2.  Jugular venous pulsation Normal  The carotid upstroke is normal in amplitude and contour without delay or bruit  Peripheral pulses are symmetrical and full   Abdomen:  No masses or tenderness  Bowel sounds present  Extremities:   No cyanosis or clubbing   No lower extremity edema   Skin: warm and dry  Neurological:  Alert and oriented  Moves all extremities well  No abnormalities of mood, affect, memory, mentation, or behavior are noted    Data  Echo 10/17/2022:   Summary   Technically difficult study due to poor acoustic window and patient on vent. Difficult to assess left ventricular systolic function due to arrhythmia but appears moderately reduced with an ejection fraction of 35% (better assessed   on definity images, however ectopy makes assessment of lv function   difficult)   Normal left ventricular size with mild concentric left ventricular   hypertrophy. Left ventricular diastolic filling pressure is normal     Stress test 7/2022 Nathalie ):   Conclusions     * Mild heterogeneity of isotope uptake noted but not in a pattern consistent   with ischemia. * Ejection fraction is normal.     * No significant reversible defects. Echo 7/2022 Nathalie )  Conclusions     * Left ventricular chamber dimension is normal.     * Left ventricular function is mildly reduced with an estimated ejection   fraction of 40-45%.      * Left ventricular segmental wall motion is abnormal.     * Right ventricular systolic function is normal.     * Unable to estimate pulmonary arterial systolic pressure due to lack of   tricuspid regurgitation jet. * There is mild aortic valve regurgitation. * The aortic arch is mildly dilated. * The basal inferior wall, mid inferior wall, basal anterolateral wall, mid   anterolateral wall, basal inferolateral wall, and mid inferolateral wall are   hypokinetic. * All other walls appear normal.    All labs and testing reviewed.   Lab Review     Renal Profile:   Lab Results   Component Value Date/Time    CREATININE 0.8 10/26/2022 04:29 AM    BUN 23 10/26/2022 04:29 AM     10/26/2022 04:29 AM    K 4.0 10/26/2022 04:29 AM    K 3.9 10/22/2022 04:31 AM    CL 98 10/26/2022 04:29 AM    CO2 31 10/26/2022 04:29 AM     CBC:    Lab Results   Component Value Date/Time    WBC 13.0 10/26/2022 04:29 AM    RBC 2.97 10/26/2022 04:29 AM    HGB 8.7 10/26/2022 04:29 AM    HCT 26.4 10/26/2022 04:29 AM    MCV 89.0 10/26/2022 04:29 AM    RDW 16.3 10/26/2022 04:29 AM     10/26/2022 04:29 AM     BNP:  No results found for: BNP  Fasting Lipid Panel:    Lab Results   Component Value Date/Time    CHOL 109 10/11/2022 03:56 PM    HDL 61 10/11/2022 03:56 PM    TRIG 56 10/11/2022 03:56 PM     Cardiac Enzymes:  CK/MbTroponin  Lab Results   Component Value Date/Time    TROPONINI <0.01 10/15/2022 10:00 PM     PT/ INR   Lab Results   Component Value Date/Time    INR 1.18 10/22/2022 04:31 AM    INR 1.12 10/21/2022 04:15 AM    INR 1.11 10/20/2022 04:21 AM    PROTIME 15.0 10/22/2022 04:31 AM    PROTIME 14.3 10/21/2022 04:15 AM    PROTIME 14.2 10/20/2022 04:21 AM     PTT No results found for: PTT   Lab Results   Component Value Date/Time    MG 1.80 10/26/2022 04:29 AM      Lab Results   Component Value Date/Time    TSH 1.82 10/11/2022 03:56 PM     Assessment:  Complete heart block with junctional escape: resolved   -s/p temporary venous pacemaker placement at admission   -temporary wire pulled 10/20/2022  Paroxysmal atrial fibrillation: stable   -[id number] score 4 (age, CHF, HTN)  -not previously anticoagulated due to falls  NSVT: ongoing   -asymptomatic   First degree AV block: stable  PAC's/PVC's: stable  LBBB  Chronic systolic CHF: compensated   Nonischemic cardiomyopathy: ongoing  -EF 35% on 10/2022  -has declined ICD in past  HTN: controlled   Acute respiratory failure requiring intubation  -extubated 10/20/2022   Hyperkalemia: resolved  Hyponatremia: resolved  Hypoglycemia: resolved  AUSTIN/CKD: resolved  BPH  Anemia  Hypotension  Hypothermia  Schizophrenia       Plan:   1. Start Lisinopril 5 mg PO daily for GDMT of cardiomyopathy (was on 10 mg prior to admission)  2. Continue Lopressor to 25 mg PO BID due to PVC's and NSVT  3. Discussed anticoagulation with Muriel (POA) given [id number] score of 4 and PAF. She is not interested in starting anticoagulation at this time due to his fall risk and being impulsive. This is not unreasonable. Can continue to assess mobility and fall risk in the future. 4. 2 week cardiac event monitor ordered at discharge to assess PVC burden and heart rates  5. He follows with cardiology at the [place] and has follow up arranged next month    Discussed plan of care, medications, monitor and follow up with Muriel (POA) over the phone.        CHRISTIAN Orellana-CNP  [facility]  [phone]

## 2022-10-26 NOTE — CARE COORDINATION
Call placed to [facility]  [facility]. Spoke Aleks the . At [facility]. She states the issue with getting a response today is that the patient's entire treatment team in this PCP office is out today including  for a training. The whole office is closed for today. Therefore writer does not expect an answer regarding LTACH. Left message for Pollo Kenny or  in that office to return writer's call. NGT being pulled today, however Jeny at [address] [place] [address] James states based on diagnosis codes she thinks he still meets LTACH criteria. If this is denied for this level of care by [place] will need SNF at minimum. Call placed to Daughter Muriel to discuss back up SNF options in the event LTACH is denied. Daughter is open to any local facility that may be able to take [facility] patient's. Discussed [address]. She sill be here within the hour and writer will meet with her to discuss skilled rehabs. LTACH VS SNF is current plan.

## 2022-10-26 NOTE — PROGRESS NOTES
Occupational Therapy  Facility/Department: [facility] C4 PCU  Treatment Note    Name: Claudia Chacon  : 1945  MRN: [id number]  Date of Service: 10/26/2022    Discharge Recommendations:  [address]          Patient Diagnosis(es): The primary encounter diagnosis was Bradycardia. Diagnoses of Complete heart block (HCC) and PVC's (premature ventricular contractions) were also pertinent to this visit. Past Medical History:  has a past medical history of Acute kidney injury ([address] Anemia, Arthritis, Atrial fibrillation ([address] Atrial flutter ([address] CAD (coronary artery disease), Cardiomyopathy ([address] Cellulitis of right upper extremity, CHF (congestive heart failure) ([address] CKD (chronic kidney disease), Hypertension, Hypovolemia, PVC's (premature ventricular contractions), Schizophrenia ([address] Seizures ([address] and Urinary tract infection. Past Surgical History:  has a past surgical history that includes Appendectomy; TURP; bronchoscopy (N/A, 10/17/2022); bronchoscopy (10/17/2022); and bronchoscopy (N/A, 10/19/2022). Assessment   Performance deficits / Impairments: Decreased functional mobility ; Decreased balance;Decreased coordination;Decreased ADL status; Decreased posture;Decreased ROM; Decreased strength;Decreased endurance;Decreased fine motor control  Assessment: Pt is 77 yo male presenting with symptomatic bradychardia following code blue at [facility] facility. Pt is functioning well below baseline, requiring max Ax2 to complete bed mobility and min/mod assist with self feeding & light UE grooming with use of Left hand.  Pt would benefit from continued skilled therapy  REQUIRES OT FOLLOW-UP: Yes  Activity Tolerance  Activity Tolerance: Patient Tolerated treatment well  Activity Tolerance Comments: high mendoza's in bed on 2 L O 2: 96 % O 2 sats        Plan   Occupational Therapy Plan  Times Per Week: 3-5  Current Treatment Recommendations: ROM, Balance training, Functional mobility training, Endurance training, Pain management, Safety education & training, Patient/Caregiver education & training, Equipment evaluation, education, & procurement, Positioning, Self-Care / ADL, Coordination training     Restrictions  Restrictions/Precautions  Restrictions/Precautions: Fall Risk, Up as Tolerated, Bed Alarm, Aspiration Risk, Modified Diet  Required Braces or Orthoses?: No  Position Activity Restriction  Other position/activity restrictions: tele, NG tube, 2L O2, AVAYS, video monitering    Subjective   General  Chart Reviewed: Yes  Patient assessed for rehabilitation services?: Yes  Additional Pertinent Hx: atrial fibrillation, atrial flutter, CAD, cardiomyopathy, CHF, CKD, HTN, schizophrenia. Extubated 10/20. Family / Caregiver Present: No  Referring Practitioner: Sonia Norman MD  Diagnosis: Symptomatic bradychardia  Subjective  Subjective: speech very pressured, SOB, coughing at rest  General Comment  Comments: RN cleared pt for OT; pt awake in bed, drinking from small soda can of thin liquids            Objective   Heart Rate: 80  Heart Rate Source: Monitor  BP: (!) 146/76  BP Location: Right upper arm  BP Method: Automatic  Patient Position: [facility]  MAP (Calculated): 99.33  Resp: 16  SpO2: 93 %  O2 Device: Nasal cannula             Safety Devices  Type of Devices: Telesitter in use;Call light within reach;Nurse notified; Left in bed;Bed alarm in place           ADL  Feeding: Pureed diet; Moderate assistance (due to decreased AROM RIght shoulder, feeding self with Left hand with decreased accuracy)  Grooming:  Moderate assistance (for thoroughness)        Bed mobility  Scootin Person assistance (dependent to position to [facility])        Cognition  Cognition Comment: pleasant, cooperative  Orientation  Orientation Level: Unable to assess (due to moderate speech/vocalizations impairment)                  Education Given To: Patient  Education Provided: Role of Therapy;Plan of Care;Precautions  Education Provided Comments: disease specific: use of RED/nurse call light for assist with ADL needs,positioning  Education Method: Verbal  Barriers to Learning: Cognition  Education Outcome: Demonstrated understanding;Continued education needed                   AM-PAC Score        AM-PAC Inpatient Daily Activity Raw Score: 10 (10/26/22 1311)  AM-PAC Inpatient ADL T-Scale Score : 27.31 (10/26/22 1311)  ADL Inpatient CMS 0-100% Score: 74.7 (10/26/22 1311)  ADL Inpatient CMS G-Code Modifier : CL (10/26/22 1311)      Goals  Short Term Goals  Time Frame for Short Term Goals: 1 week, 10/28/22- all goals ongoing 10/25  Short Term Goal 1: Pt will complete bed mobilty with mod Ax2 by 10/26  Short Term Goal 2: Pt will complete 10 reps BUE ex with SBA while seated EOB  Short Term Goal 3: Pt will require CGA to sit EOB and complete ADLs; 10/26 min/mod assist for self feeding with Left hand & washing face & hand  Short Term Goal 4: Pt will complete sit<>stand transfer with mod A  Patient Goals   Patient goals : \"to not be in pain\"       Therapy Time   Individual Concurrent Group Co-treatment   Time In [address] [facility]         Time Out 1230         Minutes [address] [place], [place]

## 2022-10-26 NOTE — PROGRESS NOTES
Hospitalist Progress Note      PCP: [facility]    Date of Admission: 10/15/2022    Chief Complaint: respiratory failure s/p intubation. HPI   78 y.o. male. Patient has a h/o schizoaffective disorder. He lives in [place], [place]. He receives most of his medical care through the [facility] [place] in [place]. He was initially admitted to [facility] [place] [address] in [place], [place] for hallucinations and impulsive behavior. He was there for ten days then transferred to the [place] [facility] inpatient psychiatry unit on 10/11. Around 9pm on 10/15 a code blue was called because the patient briefly went unresponsive. He had been bradycardic all day  He had been taking metoprolol chronically but this had been held throughout the day because of his bradycardia. His nurse was trying to check his vitals again as it appeared he was breathing abnormally. While vital signs were being checked the patient lost consciousness briefly (less than a minute). He awoke but was markedly bradycardic. He was sent to the ER where he was found to be hypothermic, hypotensive, and bradycardic to the 20s in complete heart block. He was transferred to [facility] for urgent transvenous pacing lead placement. Patient had a h/o chronic systolic HF, thought to be nonischemic, and paroxysmal afib. The patient required sedation during temporary pacing lead placement and then became agitated thereafter. He required restraints and was given IM geodon, so was not meaningfully interactive or able to provide any history. According to Dr. Garrett Plaza notes the patient had already been difficult to understand at baseline. Subjective:  Patient resting in bed, no voiced complaints, poor historian. Attempted glynn removal, retention reported glynn reinserted.  Tolerating oral diet, will dc NGT/TF      Medications:  Reviewed    Infusion Medications    dextrose       Scheduled Medications    metoprolol tartrate  25 mg Oral BID    OLANZapine zydis  20 mg Oral Nightly    docusate  100 mg Oral Daily    levothyroxine  125 mcg Oral Daily    atorvastatin  20 mg Oral Nightly    heparin (porcine)  5,000 Units SubCUTAneous 3 times per day    pantoprazole  40 mg IntraVENous Daily    aspirin  81 mg Oral Daily     PRN Meds: glucose, dextrose bolus **OR** dextrose bolus, glucagon (rDNA), dextrose, magnesium sulfate, potassium chloride **OR** potassium chloride, ondansetron, acetaminophen **OR** acetaminophen, ziprasidone, melatonin, perflutren lipid microspheres      Intake/Output Summary (Last 24 hours) at 10/26/2022 1126  Last data filed at 10/26/2022 1974  Gross per 24 hour   Intake 2059 ml   Output 2150 ml   Net -91 ml         Physical Exam Performed:    BP (!) 160/81   Pulse 97   Temp 99.5 °F (37.5 °C) (Oral)   Resp 16   Ht 5' 8\" (1.727 m)   Wt 200 lb 3.2 oz (90.8 kg)   SpO2 96%   BMI 30.44 kg/m²     General appearance: Alert, gen weak, NAD  HEENT: Pupils equal, round, and reactive to light. Conjunctivae/corneas clear. Neck: Supple, with full range of motion. No jugular venous distention. Trachea midline. Respiratory:  Normal respiratory effort. Diminished BS  Cardiovascular: Regular rate and rhythm with normal S1/S2 without murmurs, rubs or gallops. Abdomen: Soft, non-tender, non-distended with normal bowel sounds. Musculoskeletal: MEJIA  Skin: Skin color-pale  Neurologic:  Neurovascularly intact without any focal sensory/motor deficits.  Cranial nerves: II-XII intact, grossly non-focal.  Psychiatric: Alert, limited insight  Capillary Refill: Brisk, 3 seconds, normal   Peripheral Pulses: +2 palpable, equal bilaterally       Labs:   Recent Labs     10/24/22  0451 10/25/22  0453 10/26/22  0429   WBC 11.7* 10.4 13.0*   HGB 8.6* 8.9* 8.7*   HCT 27.0* 26.4* 26.4*    294 334       Recent Labs     10/24/22  0451 10/25/22  0453 10/26/22  0429    141 137   K 4.4 4.4 4.0    101 98*   CO2 30 30 31   BUN 25* 25* 23*   CREATININE 0.9 0.8 0.8   CALCIUM 8.3 8.4 8.6       Recent Labs     10/24/22  0451 10/25/22  0453 10/26/22  0429   AST 43* 53* 68*   ALT 36 41* 52*   BILITOT 0.4 0.5 0.3   ALKPHOS 102 94 92       No results for input(s): INR in the last 72 hours. No results for input(s): Juanita Smack in the last 72 hours. Urinalysis:      Lab Results   Component Value Date/Time    NITRU POSITIVE 10/17/2022 04:15 AM    WBCUA 10-20 10/17/2022 04:15 AM    BACTERIA 2+ 10/17/2022 04:15 AM    RBCUA 21-50 10/17/2022 04:15 AM    BLOODU LARGE 10/17/2022 04:15 AM    SPECGRAV 1.010 10/17/2022 04:15 AM    GLUCOSEU Negative 10/17/2022 04:15 AM       Radiology:  [facility] MODIFIED BARIUM SWALLOW W VIDEO   Final Result   No aspiration noted      Please see separate speech pathology report for full discussion of findings   and recommendations. XR SHOULDER RIGHT (MIN 2 VIEWS)   Final Result   Anterior, subcoracoid dislocation of the shoulder. Suspected multiple intra-articular calcifications. 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 XR CHEST PORTABLE   Final Result   1. Retraction of the endotracheal tube into high normal position. 2. No significant change in bibasilar airspace opacities worse on the right   due in part to passive atelectasis given at least trace bilateral pleural   effusions. Superimposed pneumonia and aspiration or aspiration is suspected   given comparison CT appearance. 3. Pulmonary vascular congestion and at least borderline cardiomegaly. 4. Incomplete evaluation of apparent right glenohumeral joint dislocation. 5. Age-indeterminate left humeral head fracture. XR ABDOMEN (KUB) (SINGLE AP VIEW)   Final Result   Distal tip of nasogastric tube is visualized in the region of the proximal   aspect of the stomach as described above. XR ABDOMEN (KUB) (SINGLE AP VIEW)   Final Result   Nasogastric tube projects in normal, intragastric position.       Right basilar consolidation. CT ABDOMEN PELVIS WO CONTRAST Additional Contrast? None   Final Result   Elevation of the right hemidiaphragm with right middle lobe and right lower   lobe consolidation/atelectasis. This could represent pneumonia. Small right   pleural effusion. Small left pleural effusion with some compressive   atelectasis of the left lower lobe. Moderate cardiomegaly with coronary artery calcifications. Calcified gallstones in the gallbladder. The gallbladder is contracted with   apparent mural calcifications, in keeping with a porcelain gallbladder. This   is associated with increased incidence of gallbladder carcinoma. Surgical   consultation recommended. Moderate amount of fecal material in the left colon, with mild rectal fecal   impaction. Findings are in keeping with constipation. 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 XR CHEST PORTABLE   Final Result   Supportive tubing projects in normal position. Low lung volumes. Right basilar opacity, indeterminate for atelectasis or pneumonia/aspiration   pneumonitis. XR CHEST PORTABLE   Final Result   Endotracheal tube tip is 3 cm above the ade. Enteric tube tip is within   the stomach with side port just below the gastroesophageal junction. XR ABDOMEN (KUB) (SINGLE AP VIEW)   Final Result   Endotracheal tube tip is 3 cm above the ade. Enteric tube tip is within   the stomach with side port just below the gastroesophageal junction.                  Assessment/Plan:    Active Hospital Problems    Diagnosis     Closed traumatic subcoracoid dislocation of right shoulder joint [S43.014A]      Priority: Medium    Proteus mirabilis infection [A49.8]      Priority: Medium    Staphylococcal pneumonia ([address] [J15.20]      Priority: Medium    Pyuria [R82.81]      Priority: Medium    Aspiration into airway [T17.908A]      Priority: Medium    Complete heart block ([address] [I44.2] Priority: Medium    Systolic heart failure (HCC) [I50.20]      Priority: Medium    Symptomatic bradycardia [R00.1]      Priority: Medium    AUSTIN (acute kidney injury) ([facility] [N17.9]      Priority: Medium    Pulmonary infiltrates [R91.8]      Priority: Medium    Shock circulatory ([facility] [R57.9]      Priority: Medium    Normocytic hypochromic anemia [D50.9]      Priority: Medium    Observed seizure-like activity (HCC) [R56.9]      Priority: Medium    Acute respiratory failure with hypoxia (HCC) [J96.01]      Priority: Medium    Paroxysmal atrial fibrillation (HCC) [I48.0]      Priority: Medium    Schizoaffective disorder ([facility] [F25.9]      Priority: Medium    Cardiomyopathy ([facility] [I42.9]        Right shoulder dislocation  Incidentally chest x-ray showed right shoulder dislocation. Confirmed by right shoulder x-ray. Orthopedic surgeon consulted-plan for conservative management. Acute hypoxemic respiratory failure due to pneumonia and acute on chronic systolic heart failure: Patient required intubation and mechanical ventilation. required mechanical ventilator   S/p extubation on 10/20/22  Now on 2 L via NC. Continue speech and swallow therapy:   SLP recommended MBSS-IDDSI 4 Puree Solids; IDDSI 0 Thin Liquids; Meds crushed in puree as able  PT OT consult   Tolerating oral diet-will dc NGT/TF discussed with Dietitian  Resp cx:staph aureus-completed 7 days abx    Pneumonia  Initially treated with IV cefepime and now on  IV ceftriaxone-completed    Bradycardia, Complete heart block with junctional escape on admission s/p temporary venous pacemaker   Off dopamine drip. Cardiology on board. Anemia-LILY, iron studies reviewed. On Iron supplement. Venofer ordered.     Abnormal LFT  improving    AUSTIN  Monitor GFR, avoid nephrotoxic agent, voiding trial    Hypothermia  Resolved    UTI-POA, proteus mirabilis, completed Rocephin     Acute on chronic Systolic HF  -  TTE 5/0/65:  LVEF = 40-45%, New EF 35%  C/w current meds, I&O, HF orderset, BNP 5,077     Schizoaffective D/O  -  Continue home olanzapine 20mg nightly, trazodone 50mg nightly. Psych consulted     DVT Prophylaxis: heparin   Diet: ADULT DIET;  Dysphagia - Pureed  ADULT ORAL NUTRITION SUPPLEMENT; Lunch, Dinner; Frozen Oral Supplement  Code Status: Full Code  PT/OT Eval Status: yes    Dispo -SNF-poss dc 10/27    CHRISTIAN Boggs - CNP

## 2022-10-26 NOTE — CARE COORDINATION
Spoke with Vermillion at [facility] in [facility].. She states LTACH is denied which was anticipated since NGT was pulled and speech has recommended diet. Requested SNF approval  for skilled level of care. Spoke with patient and Muriel POA about facility choices. Family prefer patient go closer to [place] for rehab , however they also prefer to stay within their [place] benefit as opposed to using his [employer]. Therefore facility choices are more limited. [facility] is concerned patient may need to be in a facility for longer than 30 days . Therefore Vermillion thinks they need to go to a facility that can accommodate long term if needed. Family wants patient to come home and is hopeful he will get better and stronger with rehab. [facility] an in [place] [address] SNF that family was agreeable to due to location . Referral initiated. Giovanna in admissions at [phone] has received referral and will let writer know if they can accept. Awaiting callback from Vermillion at [facility] to see if SNF is approved. Faxed updated clinical to Vermillion at [facility] per her request. She states she had received initial clinical sent. Addendum: Have called [facility] multiple times and left  for follow up return call with status of approval for SNF and facility determination as to if they can accept or not. Discussed with patient's sister the alternative of going to a facility under his [employer] Medicare if unable to find a contracted in network facility with [facility] [place]. Potential barriers with SNF is hx psych history. Patient is pleasant and not having behaviors at this time and is following commands with staff.

## 2022-10-27 LAB
A/G RATIO: 0.7 (ref 1.1–2.2)
ALBUMIN SERPL-MCNC: 2.8 G/DL (ref 3.4–5)
ALP BLD-CCNC: 88 U/L (ref 40–129)
ALT SERPL-CCNC: 44 U/L (ref 10–40)
ANION GAP SERPL CALCULATED.3IONS-SCNC: 8 MMOL/L (ref 3–16)
AST SERPL-CCNC: 45 U/L (ref 15–37)
BASOPHILS ABSOLUTE: 0 K/UL (ref 0–0.2)
BASOPHILS RELATIVE PERCENT: 0.4 %
BILIRUB SERPL-MCNC: 0.4 MG/DL (ref 0–1)
BUN BLDV-MCNC: 23 MG/DL (ref 7–20)
CALCIUM SERPL-MCNC: 8.8 MG/DL (ref 8.3–10.6)
CHLORIDE BLD-SCNC: 101 MMOL/L (ref 99–110)
CO2: 31 MMOL/L (ref 21–32)
CREAT SERPL-MCNC: 0.7 MG/DL (ref 0.8–1.3)
EOSINOPHILS ABSOLUTE: 0.2 K/UL (ref 0–0.6)
EOSINOPHILS RELATIVE PERCENT: 1.4 %
GFR SERPL CREATININE-BSD FRML MDRD: >60 ML/MIN/{1.73_M2}
GLUCOSE BLD-MCNC: 113 MG/DL (ref 70–99)
HCT VFR BLD CALC: 26.7 % (ref 40.5–52.5)
HEMOGLOBIN: 8.6 G/DL (ref 13.5–17.5)
LYMPHOCYTES ABSOLUTE: 0.8 K/UL (ref 1–5.1)
LYMPHOCYTES RELATIVE PERCENT: 6.6 %
MAGNESIUM: 1.9 MG/DL (ref 1.8–2.4)
MCH RBC QN AUTO: 28.8 PG (ref 26–34)
MCHC RBC AUTO-ENTMCNC: 32.1 G/DL (ref 31–36)
MCV RBC AUTO: 89.6 FL (ref 80–100)
MONOCYTES ABSOLUTE: 1.2 K/UL (ref 0–1.3)
MONOCYTES RELATIVE PERCENT: 10.3 %
NEUTROPHILS ABSOLUTE: 9.4 K/UL (ref 1.7–7.7)
NEUTROPHILS RELATIVE PERCENT: 81.3 %
PDW BLD-RTO: 16.7 % (ref 12.4–15.4)
PLATELET # BLD: 381 K/UL (ref 135–450)
PMV BLD AUTO: 8.5 FL (ref 5–10.5)
POTASSIUM SERPL-SCNC: 4.4 MMOL/L (ref 3.5–5.1)
PRO-BNP: 2133 PG/ML (ref 0–449)
RBC # BLD: 2.98 M/UL (ref 4.2–5.9)
SODIUM BLD-SCNC: 140 MMOL/L (ref 136–145)
TOTAL PROTEIN: 6.7 G/DL (ref 6.4–8.2)
WBC # BLD: 11.6 K/UL (ref 4–11)

## 2022-10-27 PROCEDURE — 6370000000 HC RX 637 (ALT 250 FOR IP): Performed by: INTERNAL MEDICINE

## 2022-10-27 PROCEDURE — C9113 INJ PANTOPRAZOLE SODIUM, VIA: HCPCS | Performed by: INTERNAL MEDICINE

## 2022-10-27 PROCEDURE — 97110 THERAPEUTIC EXERCISES: CPT

## 2022-10-27 PROCEDURE — 97530 THERAPEUTIC ACTIVITIES: CPT

## 2022-10-27 PROCEDURE — 2700000000 HC OXYGEN THERAPY PER DAY

## 2022-10-27 PROCEDURE — 99232 SBSQ HOSP IP/OBS MODERATE 35: CPT

## 2022-10-27 PROCEDURE — 6370000000 HC RX 637 (ALT 250 FOR IP): Performed by: NURSE PRACTITIONER

## 2022-10-27 PROCEDURE — 36415 COLL VENOUS BLD VENIPUNCTURE: CPT

## 2022-10-27 PROCEDURE — 6360000002 HC RX W HCPCS: Performed by: INTERNAL MEDICINE

## 2022-10-27 PROCEDURE — 85025 COMPLETE CBC W/AUTO DIFF WBC: CPT

## 2022-10-27 PROCEDURE — 2060000000 HC ICU INTERMEDIATE R&B

## 2022-10-27 PROCEDURE — 83880 ASSAY OF NATRIURETIC PEPTIDE: CPT

## 2022-10-27 PROCEDURE — 94761 N-INVAS EAR/PLS OXIMETRY MLT: CPT

## 2022-10-27 PROCEDURE — 6370000000 HC RX 637 (ALT 250 FOR IP)

## 2022-10-27 PROCEDURE — 80053 COMPREHEN METABOLIC PANEL: CPT

## 2022-10-27 PROCEDURE — 83735 ASSAY OF MAGNESIUM: CPT

## 2022-10-27 RX ORDER — TAMSULOSIN HYDROCHLORIDE 0.4 MG/1
0.4 CAPSULE ORAL DAILY
Status: DISCONTINUED | OUTPATIENT
Start: 2022-10-27 | End: 2022-10-28 | Stop reason: HOSPADM

## 2022-10-27 RX ADMIN — HEPARIN SODIUM 5000 UNITS: 5000 INJECTION INTRAVENOUS; SUBCUTANEOUS at 14:42

## 2022-10-27 RX ADMIN — ASPIRIN 81 MG 81 MG: 81 TABLET ORAL at 07:38

## 2022-10-27 RX ADMIN — FERROUS SULFATE TAB 325 MG (65 MG ELEMENTAL FE) 325 MG: 325 (65 FE) TAB at 07:38

## 2022-10-27 RX ADMIN — DOCUSATE SODIUM 100 MG: 50 LIQUID ORAL at 07:38

## 2022-10-27 RX ADMIN — PANTOPRAZOLE SODIUM 40 MG: 40 INJECTION, POWDER, FOR SOLUTION INTRAVENOUS at 07:38

## 2022-10-27 RX ADMIN — OLANZAPINE 20 MG: 5 TABLET, ORALLY DISINTEGRATING ORAL at 20:38

## 2022-10-27 RX ADMIN — HEPARIN SODIUM 5000 UNITS: 5000 INJECTION INTRAVENOUS; SUBCUTANEOUS at 06:08

## 2022-10-27 RX ADMIN — HEPARIN SODIUM 5000 UNITS: 5000 INJECTION INTRAVENOUS; SUBCUTANEOUS at 20:38

## 2022-10-27 RX ADMIN — LISINOPRIL 5 MG: 5 TABLET ORAL at 07:38

## 2022-10-27 RX ADMIN — ATORVASTATIN CALCIUM 20 MG: 10 TABLET, FILM COATED ORAL at 20:38

## 2022-10-27 RX ADMIN — Medication 3 MG: at 20:38

## 2022-10-27 RX ADMIN — METOPROLOL TARTRATE 25 MG: 25 TABLET, FILM COATED ORAL at 20:38

## 2022-10-27 RX ADMIN — LEVOTHYROXINE SODIUM 125 MCG: 0.12 TABLET ORAL at 06:07

## 2022-10-27 RX ADMIN — METOPROLOL TARTRATE 25 MG: 25 TABLET, FILM COATED ORAL at 07:38

## 2022-10-27 RX ADMIN — TAMSULOSIN HYDROCHLORIDE 0.4 MG: 0.4 CAPSULE ORAL at 17:49

## 2022-10-27 NOTE — PROGRESS NOTES
[facility]     Electrophysiology                                     Progress Note    Admission date:  10/15/2022    Reason for follow up visit: CHB/AF    HPI/CC: Rock Coburn was admitted on 10/15/2022 from [facility]. He was a patient on the inpatient psychiatry unit. He was found to be hypothermic and bradycardic. EKG showed CHB with junctional escape rhythm. He was transferred to [facility] and had a temporary venous pacemaker placed. Dopamine was started. Dopamine has since been discontinued. On 10/20/2022 he was extubated and temporary pacing wire was pulled. On 10/21/2022 metoprolol was restarted. Rhythm has been SR with PVC's, heart rates in the 70's. NSVT events noted (3 consecutive PVC's)    Subjective: He is seen resting in the chair. He reports bilateral hand pain. Denies dizziness and chest pain. Vitals:  Blood pressure 106/64, pulse 72, temperature 98 °F (36.7 °C), temperature source Axillary, resp. rate 18, height 5' 8\" (1.727 m), weight 204 lb 9.6 oz (92.8 kg), SpO2 98 %.   Temp  Av.6 °F (37 °C)  Min: 98 °F (36.7 °C)  Max: 99.2 °F (37.3 °C)  Pulse  Av.9  Min: 72  Max: 89  BP  Min: 106/64  Max: 143/82  SpO2  Av %  Min: 94 %  Max: 98 %    24 hour I/O    Intake/Output Summary (Last 24 hours) at 10/27/2022 1306  Last data filed at 10/27/2022 4484  Gross per 24 hour   Intake 300 ml   Output 1735 ml   Net -1435 ml       Current Facility-Administered Medications   Medication Dose Route Frequency Provider Last Rate Last Admin    ferrous sulfate (IRON 325) tablet 325 mg  325 mg Oral Daily with breakfast CHRISTIAN Clarke CNP   325 mg at 10/27/22 0738    lisinopril (PRINIVIL;ZESTRIL) tablet 5 mg  5 mg Oral Daily Daniel Mahmoodr, APRN - CNP   5 mg at 10/27/22 0738    metoprolol tartrate (LOPRESSOR) tablet 25 mg  25 mg Oral BID Daniel Mahmoodr, APRN - CNP   25 mg at 10/27/22 0738    glucose chewable tablet 16 g  4 tablet Oral PRN Nyasia Rosen MD        dextrose bolus 10% 125 mL  125 mL IntraVENous PRN Nyasia Rosen MD   Stopped at 10/21/22 1709    Or    dextrose bolus 10% 250 mL  250 mL IntraVENous PRN Nyasia Rosen MD        glucagon (rDNA) injection 1 mg  1 mg SubCUTAneous PRN Nyasia Rosen MD        dextrose 10 % infusion   IntraVENous Continuous PRN Nyasia Rosen MD        OLANZapine zydis (ZYPREXA) disintegrating tablet 20 mg  20 mg Oral Nightly Nyasia Rosen MD   20 mg at 10/26/22 2015    magnesium sulfate 1000 mg in dextrose 5% 100 mL IVPB  1,000 mg IntraVENous PRN Nyasia Rosen MD        docusate (COLACE) 50 MG/5ML liquid 100 mg  100 mg Oral Daily Luigi Hdz MD   100 mg at 10/27/22 0738    potassium chloride 20 mEq/50 mL IVPB (Central Line)  20 mEq IntraVENous PRN Bonnie Luo MD        Or    potassium chloride 10 mEq/100 mL IVPB (Peripheral Line)  10 mEq IntraVENous PRN Bonnie Luo MD        ondansetron [facility]) injection 4 mg  4 mg IntraVENous Q6H PRN Bonnie Luo MD        acetaminophen (TYLENOL) tablet 650 mg  650 mg Oral Q4H PRN Bonnie Luo MD   650 mg at 10/22/22 1128    Or    acetaminophen (TYLENOL) suppository 650 mg  650 mg Rectal Q4H PRN Bonnie Luo MD   650 mg at 10/24/22 2108    ziprasidone (GEODON) injection 10 mg  10 mg IntraMUSCular Q12H PRN Bonnie Luo MD   10 mg at 10/23/22 1334    levothyroxine (SYNTHROID) tablet 125 mcg  125 mcg Oral Daily Bonnie Luo MD   125 mcg at 10/27/22 6476    melatonin tablet 3 mg  3 mg Oral Nightly PRN Bonnie Luo MD   3 mg at 10/26/22 2015    atorvastatin (LIPITOR) tablet 20 mg  20 mg Oral Nightly Bonnie Luo MD   20 mg at 10/26/22 2015    heparin (porcine) injection 5,000 Units  5,000 Units SubCUTAneous 3 times per day Nyasia Rosen MD   5,000 Units at 10/27/22 0608    pantoprazole (PROTONIX) injection 40 mg  40 mg IntraVENous Daily Nyasia Rosen MD   40 mg at 10/27/22 4643    aspirin chewable tablet 81 mg  81 mg Oral Daily Tariq Junior DO   81 mg at 10/27/22 0738    perflutren lipid microspheres (DEFINITY) injection 1.65 mg  1.5 mL IntraVENous ONCE PRN Tariq Haddox, DO           Objective:     Telemetry monitor: SR with PVC's, NSVT    Physical Exam:  Constitutional and general appearance: alert, cooperative, no distress, and appears stated age  [de-identified]: Normal oral mucosa  Respiratory:  Normal excursion and expansion without use of accessory muscles  Resp auscultation: clear anteriorly   Cardiovascular: The apical impulse is not displaced  Heart tones are crisp and normal. regular S1 and S2.  Jugular venous pulsation Normal  The carotid upstroke is normal in amplitude and contour without delay or bruit  Peripheral pulses are symmetrical and full   Abdomen:  No masses or tenderness  Bowel sounds present  Extremities:   No cyanosis or clubbing   No lower extremity edema   Skin: warm and dry  Neurological:  Alert   Moves all extremities well  No abnormalities of mood, or affect are noted    Data  Echo 10/17/2022:   Summary   Technically difficult study due to poor acoustic window and patient on vent. Difficult to assess left ventricular systolic function due to arrhythmia but appears moderately reduced with an ejection fraction of 35% (better assessed   on definity images, however ectopy makes assessment of lv function   difficult)   Normal left ventricular size with mild concentric left ventricular   hypertrophy. Left ventricular diastolic filling pressure is normal     Stress test 7/2022 [address] [address]):   Conclusions     * Mild heterogeneity of isotope uptake noted but not in a pattern consistent   with ischemia. * Ejection fraction is normal.     * No significant reversible defects. Echo 7/2022 68[address] [address])  Conclusions     * Left ventricular chamber dimension is normal.     * Left ventricular function is mildly reduced with an estimated ejection   fraction of 40-45%.      * Left ventricular segmental wall motion is abnormal.     * Right ventricular systolic function is normal.     * Unable to estimate pulmonary arterial systolic pressure due to lack of   tricuspid regurgitation jet. * There is mild aortic valve regurgitation. * The aortic arch is mildly dilated. * The basal inferior wall, mid inferior wall, basal anterolateral wall, mid   anterolateral wall, basal inferolateral wall, and mid inferolateral wall are   hypokinetic. * All other walls appear normal.    All labs and testing reviewed.   Lab Review     Renal Profile:   Lab Results   Component Value Date/Time    CREATININE 0.7 10/27/2022 04:49 AM    BUN 23 10/27/2022 04:49 AM     10/27/2022 04:49 AM    K 4.4 10/27/2022 04:49 AM    K 3.9 10/22/2022 04:31 AM     10/27/2022 04:49 AM    CO2 31 10/27/2022 04:49 AM     CBC:    Lab Results   Component Value Date/Time    WBC 11.6 10/27/2022 04:49 AM    RBC 2.98 10/27/2022 04:49 AM    HGB 8.6 10/27/2022 04:49 AM    HCT 26.7 10/27/2022 04:49 AM    MCV 89.6 10/27/2022 04:49 AM    RDW 16.7 10/27/2022 04:49 AM     10/27/2022 04:49 AM     BNP:  No results found for: BNP  Fasting Lipid Panel:    Lab Results   Component Value Date/Time    CHOL 109 10/11/2022 03:56 PM    HDL 61 10/11/2022 03:56 PM    TRIG 56 10/11/2022 03:56 PM     Cardiac Enzymes:  CK/MbTroponin  Lab Results   Component Value Date/Time    TROPONINI <0.01 10/15/2022 10:00 PM     PT/ INR   Lab Results   Component Value Date/Time    INR 1.18 10/22/2022 04:31 AM    INR 1.12 10/21/2022 04:15 AM    INR 1.11 10/20/2022 04:21 AM    PROTIME 15.0 10/22/2022 04:31 AM    PROTIME 14.3 10/21/2022 04:15 AM    PROTIME 14.2 10/20/2022 04:21 AM     PTT No results found for: PTT   Lab Results   Component Value Date/Time    MG 1.90 10/27/2022 04:49 AM      Lab Results   Component Value Date/Time    TSH 1.82 10/11/2022 03:56 PM     Assessment:  Complete heart block with junctional escape: resolved   -s/p temporary venous pacemaker placement at admission   -temporary wire pulled 10/20/2022  Paroxysmal atrial fibrillation: stable   -[id number] score 4 (age, CHF, HTN)  -not previously anticoagulated due to falls  NSVT: ongoing   -asymptomatic   First degree AV block: stable  PAC's/PVC's: stable  LBBB  Chronic systolic CHF: compensated   Nonischemic cardiomyopathy: ongoing  -EF 35% on 10/2022  -has declined ICD in past  HTN: controlled   Acute respiratory failure requiring intubation  -extubated 10/20/2022   Hyperkalemia: resolved  Hyponatremia: resolved  Hypoglycemia: resolved  AUSTIN/CKD: resolved  BPH  Anemia  Hypotension  Hypothermia  Schizophrenia       Plan:   1. Continue lisinopril 5 mg PO daily and Lopressor 25 mg PO BID. Medications can be titrated on an outpatient basis. 2. BMP in 1 week to assess kidneys and potassium with resuming lisinopril   3. Discussed anticoagulation with Muriel (POA) given [id number] score of 4 and PAF. She is not interested in starting anticoagulation at this time due to his fall risk and being impulsive. This is not unreasonable. Can continue to assess mobility and fall risk in the future. 4. 2 week cardiac event monitor ordered at discharge to assess PVC burden and heart rates. [facility]  5. He follows with cardiology at the [facility] [place] and has follow up arranged next month    EP will sign off.  Please reach out if any needs arise prior to discharge       CHRISTIAN Peterson-CNP  [facility]  [phone]

## 2022-10-27 NOTE — PROGRESS NOTES
Hospitalist Progress Note      PCP: [facility]    Date of Admission: 10/15/2022    Chief Complaint: respiratory failure s/p intubation. HPI   78 y.o. male. Patient has a h/o schizoaffective disorder. He lives in [place], [place]. He receives most of his medical care through the [place] in [place]. He was initially admitted to [facility] [address] [place] [address] in [place], [place] for hallucinations and impulsive behavior. He was there for ten days then transferred to the [facility] geriatric inpatient psychiatry unit on 10/11. Around 9pm on 10/15 a code blue was called because the patient briefly went unresponsive. He had been bradycardic all day  He had been taking metoprolol chronically but this had been held throughout the day because of his bradycardia. His nurse was trying to check his vitals again as it appeared he was breathing abnormally. While vital signs were being checked the patient lost consciousness briefly (less than a minute). He awoke but was markedly bradycardic. He was sent to the ER where he was found to be hypothermic, hypotensive, and bradycardic to the 20s in complete heart block. He was transferred to [address] [place] [address] for urgent transvenous pacing lead placement. Patient had a h/o chronic systolic HF, thought to be nonischemic, and paroxysmal afib. The patient required sedation during temporary pacing lead placement and then became agitated thereafter. He required restraints and was given IM geodon, so was not meaningfully interactive or able to provide any history. According to Dr. Christie Vizcarra notes the patient had already been difficult to understand at baseline. Subjective: Patient sitting in chair, NAD, tolerating diet.        Medications:  Reviewed    Infusion Medications    dextrose       Scheduled Medications    ferrous sulfate  325 mg Oral Daily with breakfast    lisinopril  5 mg Oral Daily    metoprolol tartrate  25 mg Oral BID OLANZapine zydis  20 mg Oral Nightly    docusate  100 mg Oral Daily    levothyroxine  125 mcg Oral Daily    atorvastatin  20 mg Oral Nightly    heparin (porcine)  5,000 Units SubCUTAneous 3 times per day    pantoprazole  40 mg IntraVENous Daily    aspirin  81 mg Oral Daily     PRN Meds: glucose, dextrose bolus **OR** dextrose bolus, glucagon (rDNA), dextrose, magnesium sulfate, potassium chloride **OR** potassium chloride, ondansetron, acetaminophen **OR** acetaminophen, ziprasidone, melatonin, perflutren lipid microspheres      Intake/Output Summary (Last 24 hours) at 10/27/2022 1240  Last data filed at 10/27/2022 7682  Gross per 24 hour   Intake 300 ml   Output 1735 ml   Net -1435 ml         Physical Exam Performed:    /64   Pulse 72   Temp 98 °F (36.7 °C) (Axillary)   Resp 18   Ht 5' 8\" (1.727 m)   Wt 204 lb 9.6 oz (92.8 kg)   SpO2 98%   BMI 31.11 kg/m²     General appearance: Alert, gen weak, NAD  HEENT: Pupils equal, round, and reactive to light. Conjunctivae/corneas clear. Neck: Supple, with full range of motion. No jugular venous distention. Trachea midline. Respiratory:  Normal respiratory effort. Diminished BS  Cardiovascular: Regular rate and rhythm with normal S1/S2 without murmurs, rubs or gallops. Abdomen: Soft, non-tender, non-distended with normal bowel sounds. Musculoskeletal: MEJIA  Skin: Skin color-pale  Neurologic:  Neurovascularly intact without any focal sensory/motor deficits.  Cranial nerves: II-XII intact, grossly non-focal.  Psychiatric: Alert, limited insight  Capillary Refill: Brisk, 3 seconds, normal   Peripheral Pulses: +2 palpable, equal bilaterally       Labs:   Recent Labs     10/25/22  0453 10/26/22  0429 10/27/22  0449   WBC 10.4 13.0* 11.6*   HGB 8.9* 8.7* 8.6*   HCT 26.4* 26.4* 26.7*    334 381       Recent Labs     10/25/22  0453 10/26/22  0429 10/27/22  0449    137 140   K 4.4 4.0 4.4    98* 101   CO2 30 31 31   BUN 25* 23* 23*   CREATININE 0.8 0.8 0.7*   CALCIUM 8.4 8.6 8.8       Recent Labs     10/25/22  0453 10/26/22  0429 10/27/22  0449   AST 53* 68* 45*   ALT 41* 52* 44*   BILITOT 0.5 0.3 0.4   ALKPHOS 94 92 88       No results for input(s): INR in the last 72 hours. No results for input(s): Daily Archibald in the last 72 hours. Urinalysis:      Lab Results   Component Value Date/Time    NITRU POSITIVE 10/17/2022 04:15 AM    WBCUA 10-20 10/17/2022 04:15 AM    BACTERIA 2+ 10/17/2022 04:15 AM    RBCUA 21-50 10/17/2022 04:15 AM    BLOODU LARGE 10/17/2022 04:15 AM    SPECGRAV 1.010 10/17/2022 04:15 AM    GLUCOSEU Negative 10/17/2022 04:15 AM       Radiology:  Si Lint MODIFIED BARIUM SWALLOW W VIDEO   Final Result   No aspiration noted      Please see separate speech pathology report for full discussion of findings   and recommendations. XR SHOULDER RIGHT (MIN 2 VIEWS)   Final Result   Anterior, subcoracoid dislocation of the shoulder. Suspected multiple intra-articular calcifications. 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 XR CHEST PORTABLE   Final Result   1. Retraction of the endotracheal tube into high normal position. 2. No significant change in bibasilar airspace opacities worse on the right   due in part to passive atelectasis given at least trace bilateral pleural   effusions. Superimposed pneumonia and aspiration or aspiration is suspected   given comparison CT appearance. 3. Pulmonary vascular congestion and at least borderline cardiomegaly. 4. Incomplete evaluation of apparent right glenohumeral joint dislocation. 5. Age-indeterminate left humeral head fracture. XR ABDOMEN (KUB) (SINGLE AP VIEW)   Final Result   Distal tip of nasogastric tube is visualized in the region of the proximal   aspect of the stomach as described above. XR ABDOMEN (KUB) (SINGLE AP VIEW)   Final Result   Nasogastric tube projects in normal, intragastric position. Right basilar consolidation.          CT ABDOMEN PELVIS WO CONTRAST Additional Contrast? None   Final Result   Elevation of the right hemidiaphragm with right middle lobe and right lower   lobe consolidation/atelectasis. This could represent pneumonia. Small right   pleural effusion. Small left pleural effusion with some compressive   atelectasis of the left lower lobe. Moderate cardiomegaly with coronary artery calcifications. Calcified gallstones in the gallbladder. The gallbladder is contracted with   apparent mural calcifications, in keeping with a porcelain gallbladder. This   is associated with increased incidence of gallbladder carcinoma. Surgical   consultation recommended. Moderate amount of fecal material in the left colon, with mild rectal fecal   impaction. Findings are in keeping with constipation. 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 XR CHEST PORTABLE   Final Result   Supportive tubing projects in normal position. Low lung volumes. Right basilar opacity, indeterminate for atelectasis or pneumonia/aspiration   pneumonitis. XR CHEST PORTABLE   Final Result   Endotracheal tube tip is 3 cm above the ade. Enteric tube tip is within   the stomach with side port just below the gastroesophageal junction. XR ABDOMEN (KUB) (SINGLE AP VIEW)   Final Result   Endotracheal tube tip is 3 cm above the ade. Enteric tube tip is within   the stomach with side port just below the gastroesophageal junction.                  Assessment/Plan:    Active Hospital Problems    Diagnosis     PVC (premature ventricular contraction) [I49.3]      Priority: Medium    Closed traumatic subcoracoid dislocation of right shoulder joint [S43.014A]      Priority: Medium    Proteus mirabilis infection [A49.8]      Priority: Medium    Staphylococcal pneumonia ([facility] [J15.20]      Priority: Medium    Pyuria [R82.81]      Priority: Medium    Aspiration into airway [T17.908A]      Priority: Medium    Complete heart block (HCC) [I44.2]      Priority: Medium    Systolic heart failure ([facility] [I50.20]      Priority: Medium    Symptomatic bradycardia [R00.1]      Priority: Medium    AUSTIN (acute kidney injury) ([facility] [N17.9]      Priority: Medium    Pulmonary infiltrates [R91.8]      Priority: Medium    Shock circulatory ([facility] [R57.9]      Priority: Medium    Normocytic hypochromic anemia [D50.9]      Priority: Medium    Observed seizure-like activity ([facility] [R56.9]      Priority: Medium    Acute respiratory failure with hypoxia (HCC) [J96.01]      Priority: Medium    Paroxysmal atrial fibrillation (HCC) [I48.0]      Priority: Medium    Schizoaffective disorder ([facility] [F25.9]      Priority: Medium    Cardiomyopathy ([facility] [I42.9]        Right shoulder dislocation  Incidentally chest x-ray showed right shoulder dislocation. Confirmed by right shoulder x-ray. Orthopedic surgeon consulted-plan for conservative management. Acute hypoxemic respiratory failure due to pneumonia and acute on chronic systolic heart failure: Patient required intubation and mechanical ventilation. required mechanical ventilator   S/p extubation on 10/20/22  Now on 2 L via NC. Continue speech and swallow therapy:   SLP recommended MBSS-IDDSI 4 Puree Solids; IDDSI 0 Thin Liquids; Meds crushed in puree as able  PT OT consult   Tolerating oral diet-will dc NGT/TF discussed with Dietitian  Resp cx:staph aureus-completed 7 days abx    Pneumonia  Initially treated with IV cefepime and now on  IV ceftriaxone-completed    Bradycardia, Complete heart block with junctional escape on admission s/p temporary venous pacemaker   Off dopamine drip. Cardiology on board. Anemia-LILY, iron studies reviewed. On Iron supplement.      Abnormal LFT  improving    AUSTIN  Monitor GFR, avoid nephrotoxic agent, voiding trial    Hypothermia  Resolved    UTI-POA, proteus mirabilis, completed Rocephin     Acute on chronic Systolic HF  -  TTE 6/9/68:  LVEF = 40-45%, New EF 35%  C/w current meds, I&O, HF orderset, BNP 5,077     Schizoaffective D/O  -  Continue home olanzapine 20mg nightly, trazodone 50mg nightly. Psych consulted     DVT Prophylaxis: heparin   Diet: ADULT DIET; Dysphagia - Pureed  ADULT ORAL NUTRITION SUPPLEMENT; Lunch, Dinner; Frozen Oral Supplement  Code Status: Full Code  PT/OT Eval Status: yes    Dispo -SNF-when placement arranged, stable for discharge.     CHRISTIAN Romero - CNP

## 2022-10-27 NOTE — CARE COORDINATION
[facility] accepted. Will follow for d/c orders and arrange transport .  Plan on d/c in AM.

## 2022-10-27 NOTE — PROGRESS NOTES
Physical Therapy  Facility/Department: [facility] C4 PCU  Daily Treatment Note  NAME: Alf Rubi  : 1945  MRN: [id number]    Date of Service: 10/27/2022    Discharge Recommendations:  Subacute/Skilled Nursing Facility, [facility], [facility]   PT Equipment Recommendations  Equipment Needed: No  Other: report after further assessent  [facility] 6 Clicks Inpatient Mobility:  AM-PAC Mobility Inpatient   How much difficulty turning over in bed?: A Lot  How much difficulty sitting down on / standing up from a chair with arms?: A Lot  How much difficulty moving from lying on back to sitting on side of bed?: A Lot  How much help from another person moving to and from a bed to a chair?: Total  How much help from another person needed to walk in hospital room?: Total  How much help from another person for climbing 3-5 steps with a railing?: Total  AM-PAC Inpatient Mobility Raw Score : 9  AM-PAC Inpatient T-Scale Score : 30.55  Mobility Inpatient CMS 0-100% Score: 81.38  Mobility Inpatient CMS G-Code Modifier : CM    Patient Diagnosis(es): The primary encounter diagnosis was Bradycardia. Diagnoses of Complete heart block (HCC) and PVC's (premature ventricular contractions) were also pertinent to this visit. Assessment   Assessment: Pt requires max A  for bed mobility. When transfering to chair, max A x2 was required for sit to stand and the use of Pearly [place]. When transfering stand to sit, pt required max-A x3. When standing on the [place], pt continued to move his trunk forward. 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 When d/c, PT recommends SNF or L-TACH depending on insurance coverage. Co-tx collaboration this date with OT staff to safely progress pt toward goals. Pt will have better performance outcomes within a co-treatment than 1:1 session.     Activity Tolerance: Patient tolerated treatment well;Treatment limited secondary to decreased cognition  Equipment Needed: No  Other: report after further assessent     Plan    Physcial Therapy Plan  General Plan: 3-5 times per week  Current Treatment Recommendations: Strengthening;ROM;Balance training;Functional mobility training;Transfer training;ADL/Self-care training;Cognitive/Perceptual training; Endurance training;Gait training;Stair training;Neuromuscular re-education;Patient/Caregiver education & training; Safety education & training;Pain management;Positioning; Therapeutic activities     Restrictions  Restrictions/Precautions  Restrictions/Precautions: Fall Risk, Up as Tolerated, Bed Alarm, Aspiration Risk, Modified Diet  Required Braces or Orthoses?: No  Position Activity Restriction  Other position/activity restrictions: tele, NG tube, 2L O2, AVAYS, video monitering     Subjective    Subjective  Subjective: Pt agreeable to therapy. Difficult to understand speech at times  Pain: c/o back soerness, 7/10  Orientation  Overall Orientation Status: Impaired  Orientation Level: Oriented to person;Disoriented to time;Disoriented to place; Disoriented to situation  Cognition  Overall Cognitive Status: Exceptions  Arousal/Alertness: Delayed responses to stimuli  Following Commands: Follows one step commands with increased time  Attention Span: Difficulty dividing attention; Attends with cues to redirect  Memory: Decreased recall of precautions;Decreased recall of recent events  Safety Judgement: Decreased awareness of need for assistance;Decreased awareness of need for safety  Problem Solving: Assistance required to correct errors made  Insights: Decreased awareness of deficits  Initiation: Requires cues for some  Sequencing: Requires cues for some     Objective   Vitals     Bed Mobility Training  Bed Mobility Training: Yes  Interventions: Safety awareness training; Tactile cues; Verbal cues; Weight shifting training/pressure relief  Supine to Sit: Maximum assistance;Assist X1  Sit to Supine: Other (comment) (up to chair at end of session)  Scooting: Assist X2;Maximum assistance  Balance  Sitting: Impaired  Sitting - Static: Poor (constant support)  Sitting - Dynamic: Poor (constant support)  Standing: Impaired (john stedy)  Standing - Static: Constant support  Standing - Dynamic: Constant support  Transfer Training  Transfer Training: Yes  Interventions: Safety awareness training; Tactile cues; Verbal cues  Sit to Stand: Assist X2;Maximum assistance  Stand to Sit: Assist X2;Maximum assistance  Bed to Chair: Total assistance (using Kacey Balboa)  Gait Training  Gait Training: No     PT Exercises  Exercise Treatment: performed     Safety Devices  Type of Devices: Telesitter in use;Call light within reach;Nurse notified; Chair alarm in place; Left in chair;Gait belt       Goals  Short Term Goals  Time Frame for Short Term Goals: 7 days (10/28/22)  Short Term Goal 1: Pt will be able to conduct bed mobility exercise with min-A of x2  -10/27  max A  Short Term Goal 2: Pt will be able to perform 10x BLE exercises with Min-A x1   -10/27  on-going  Short Term Goal 3: Pt will tolerate sitting EOB x10 minutes with Supervision   -10/27 EOB 5' sba  Short Term Goal 4: Pt will transfer from sitting at edge of bed to chair min-A x2 with RW by 10/24/22   -10/27 dependent total john stedy  Patient Goals   Patient Goals : none stated by pt    Education  Patient Education  Education Given To: Patient  Education Provided: Role of Therapy;Plan of Care;Transfer Training;Orientation  Education Method: Demonstration;Verbal  Barriers to Learning: Cognition;Vision  Education Outcome: Demonstrated understanding;Verbalized understanding    Therapy Time   Individual Concurrent Group Co-treatment   Time In       0855   Time Out       0933   Minutes       38   Timed Code Treatment Minutes: [address]

## 2022-10-27 NOTE — PLAN OF CARE
Problem: Chronic Conditions and Co-morbidities  Goal: Patient's chronic conditions and co-morbidity symptoms are monitored and maintained or improved  Outcome: Progressing     Problem: Discharge Planning  Goal: Discharge to home or other facility with appropriate resources  Outcome: Progressing     Problem: Pain  Goal: Verbalizes/displays adequate comfort level or baseline comfort level  Outcome: Progressing     Problem: Neurosensory - Adult  Goal: Achieves stable or improved neurological status  Outcome: Progressing  Goal: Absence of seizures  Outcome: Progressing  Goal: Remains free of injury related to seizures activity  Outcome: Progressing  Goal: Achieves maximal functionality and self care  Outcome: Progressing     Problem: Respiratory - Adult  Goal: Achieves optimal ventilation and oxygenation  Outcome: Progressing     Problem: Cardiovascular - Adult  Goal: Maintains optimal cardiac output and hemodynamic stability  Outcome: Progressing  Goal: Absence of cardiac dysrhythmias or at baseline  Outcome: Progressing     Problem: Skin/Tissue Integrity - Adult  Goal: Skin integrity remains intact  Outcome: Progressing  Goal: Incisions, wounds, or drain sites healing without S/S of infection  Outcome: Progressing  Goal: Oral mucous membranes remain intact  Outcome: Progressing     Problem: Musculoskeletal - Adult  Goal: Return mobility to safest level of function  Outcome: Progressing  Goal: Maintain proper alignment of affected body part  Outcome: Progressing  Goal: Return ADL status to a safe level of function  Outcome: Progressing     Problem: Genitourinary - Adult  Goal: Absence of urinary retention  Outcome: Progressing  Goal: Urinary catheter remains patent  Outcome: Progressing     Problem: Hematologic - Adult  Goal: Maintains hematologic stability  Outcome: Progressing     Problem: Nutrition Deficit:  Goal: Optimize nutritional status  Outcome: Progressing     Problem: Skin/Tissue Integrity  Goal: Absence of new skin breakdown  Description: 1. Monitor for areas of redness and/or skin breakdown  2. Assess vascular access sites hourly  3. Every 4-6 hours minimum:  Change oxygen saturation probe site  4. Every 4-6 hours:  If on nasal continuous positive airway pressure, respiratory therapy assess nares and determine need for appliance change or resting period. Outcome: Progressing     Problem: Safety - Adult  Goal: Free from fall injury  Outcome: Progressing     Problem: Confusion  Goal: Confusion, delirium, dementia, or psychosis is improved or at baseline  Description: INTERVENTIONS:  1. Assess for possible contributors to thought disturbance, including medications, impaired vision or hearing, underlying metabolic abnormalities, dehydration, psychiatric diagnoses, and notify attending LIP  2. [place] high risk fall precautions, as indicated  3. Provide frequent short contacts to provide reality reorientation, refocusing and direction  4. Decrease environmental stimuli, including noise as appropriate  5. Monitor and intervene to maintain adequate nutrition, hydration, elimination, sleep and activity  6. If unable to ensure safety without constant attention obtain sitter and review sitter guidelines with assigned personnel  7.  Initiate Psychosocial CNS and Spiritual Care consult, as indicated  Outcome: Progressing

## 2022-10-27 NOTE — ACP (ADVANCE CARE PLANNING)
Advance Care Planning     Advance Care Planning Activator (Inpatient)  Conversation Note      Date of ACP Conversation: 10/27/2022     Conversation Conducted with: {Discussion Participants:58485::\"Patient with Decision Making Capacity\"}    ACP Activator: Marilee Gonsalez RN    {When Decision Maker makes decisions on behalf of the incapacitated patient: Decision Maker is asked to consider and make decisions based on patient values, known preferences, or best interests. [facility]):49748}    Health Care Decision Maker:     Current Designated Health Care Decision Maker:     Primary Decision Maker: Muriel Fernandez (POA) - Brother/Sister - [phone]  Click here to complete Healthcare Decision Makers including section of the Healthcare Decision Maker Relationship (ie \"Primary\")  {Select if Healthcare Decision Makers in ACP Activity was empty/incomplete (Optional):[id number]}    Care Preferences    Ventilation: \"If you were in your present state of health and suddenly became very ill and were unable to breathe on your own, what would your preference be about the use of a ventilator (breathing machine) if it were available to you? \"      Would the patient desire the use of ventilator (breathing machine)?: {Yes/No-Ex:262105}    \"If your health worsens and it becomes clear that your chance of recovery is unlikely, what would your preference be about the use of a ventilator (breathing machine) if it were available to you? \"     Would the patient desire the use of ventilator (breathing machine)?: {YES/NO:13046}      Resuscitation  \"CPR works best to restart the heart when there is a sudden event, like a heart attack, in someone who is otherwise healthy. Unfortunately, CPR does not typically restart the heart for people who have serious health conditions or who are very sick. \"    \"In the event your heart stopped as a result of an underlying serious health condition, would you want attempts to be made to restart your heart (answer \"yes\" for attempt to resuscitate) or would you prefer a natural death (answer \"no\" for do not attempt to resuscitate)? \" {Yes/No-Ex:815450}

## 2022-10-27 NOTE — PROGRESS NOTES
Occupational Therapy  Facility/Department: [facility] C4 PCU  Daily Treatment Note  NAME: Ren Gong  : 1945  MRN: [id number]    Date of Service: 10/27/2022    Discharge Recommendations:  Subacute/Skilled Nursing Facility     AM-PAC score  AM-PAC Inpatient Daily Activity Raw Score: 10 (10/27/22 1210)  AM-PAC Inpatient ADL T-Scale Score : 27.31 (10/27/22 1210)  ADL Inpatient CMS 0-100% Score: 74.7 (10/27/22 1210)  ADL Inpatient CMS G-Code Modifier : CL (10/27/22 1210)      Patient Diagnosis(es): The primary encounter diagnosis was Bradycardia. Diagnoses of Complete heart block (HCC) and PVC's (premature ventricular contractions) were also pertinent to this visit. Assessment    Assessment: Patient maxA for bed moblity with max of 2 for sit to stand with Sina begum for transfer OOB to chair. Pt motivated to working w/ therapy today, sling applied to RUE for comfort and visual awareness 2* acute dislocation(non-surgical). Patient seen as Co-tx collaboration this date to safely meet goals and will have better occupational performance outcomes with in a co-treatment than 1:1 session. Pt continues to function below baseline and would benefit from cont. Skilled OT while in acute care. Activity Tolerance: Patient tolerated treatment well;Treatment limited secondary to decreased cognition  Discharge Recommendations: Subacute/Skilled Nursing Facility      Plan   Occupational Therapy Plan  Times Per Week: 3-5x's a week while in acute care     Restrictions  Restrictions/Precautions  Restrictions/Precautions: Fall Risk;Up as Tolerated; Bed Alarm;Aspiration Risk;Modified Diet  Required Braces or Orthoses?: Yes  Required Braces or Orthoses  Right Upper Extremity Brace/Splint: Sling (for comfort)  Position Activity Restriction  Other position/activity restrictions: tele, 2L O2, AVAYS    Subjective   Subjective  Subjective: Pt pleasant and agreeable to OT session and OOB.   Orientation  Overall Orientation Status: Impaired  Orientation Level: Oriented to person;Disoriented to time;Disoriented to place; Disoriented to situation  Pain: c/o back soerness, 7/10  Cognition  Overall Cognitive Status: Exceptions  Arousal/Alertness: Delayed responses to stimuli  Following Commands: Follows one step commands with increased time  Attention Span: Difficulty dividing attention; Attends with cues to redirect  Memory: Decreased recall of precautions;Decreased recall of recent events  Safety Judgement: Decreased awareness of need for assistance;Decreased awareness of need for safety  Problem Solving: Assistance required to correct errors made  Insights: Decreased awareness of deficits  Initiation: Requires cues for some  Sequencing: Requires cues for some        Objective    Vitals  Bp 112/66, HR 84 bpm, O2 95% on 1L  Pain:denies    Bed Mobility Training  Bed Mobility Training: Yes  Interventions: Safety awareness training; Tactile cues; Verbal cues; Weight shifting training/pressure relief  Supine to Sit: Maximum assistance;Assist X1  Sit to Supine: Other (comment) (up to chair at end of session)  Scooting: Assist X2;Maximum assistance    Balance  Sitting: Impaired  Sitting - Static: Poor (constant support)  Sitting - Dynamic: Poor (constant support)  Standing: Impaired (john shy)  Standing - Static: Constant support  Standing - Dynamic: Constant support    Transfer Training  Transfer Training: Yes  Interventions: Safety awareness training; Tactile cues; Verbal cues  Sit to Stand: Assist X2;Maximum assistance  Stand to Sit: Assist X2;Maximum assistance  Bed to Chair: Total assistance (using Darrall Pillar)  Gait Training  Gait Training: No     ADL  Feeding: Beverage management;Setup  LE Dressing: Dependent/Total  Toileting: Dependent/Total        Safety Devices  Type of Devices: Telesitter in use;Call light within reach;Nurse notified; Chair alarm in place; Left in chair;Gait belt     Patient Education  Education Given To: Patient  Education Provided: Role of Therapy;Plan of Care;Precautions  Education Provided Comments: disease specific: use of RED/nurse call light for assist with ADL needs,positioning  Education Method: Verbal  Barriers to Learning: Cognition  Education Outcome: Demonstrated understanding;Continued education needed    Goals  Short Term Goals  Time Frame for Short Term Goals: 1 week, 10/28/22- all goals ongoing 10/27  Short Term Goal 1: Pt will complete bed mobilty with mod Ax2 by 10/26  Short Term Goal 2: Pt will complete 10 reps BUE ex with SBA while seated EOB  Short Term Goal 3: Pt will require CGA to sit EOB and complete ADLs; 10/26 min/mod assist for self feeding with Left hand & washing face & hand  Short Term Goal 4: Pt will complete sit<>stand transfer with mod A  Patient Goals   Patient goals : \"to not be in pain\"       Therapy Time   Individual Concurrent Group Co-treatment   Time In 1932         Time Out 0932         Minutes 38         Timed Code Treatment Minutes: 38 Minutes       Kaity Cleveland OTR/L  If pt is unable to be seen after this session, please let this note serve as discharge summary. Please see case management note for discharge disposition. Thank you.

## 2022-10-27 NOTE — CARE COORDINATION
Spoke with Spenser Farooq at [facility] they have received an approval for 2 weeks initially from [facility] and facility is waiting for corporate approval to see if they can accept due to psych history. If she can accept they will let writer know.  Medically ready as of today per hospitalist.

## 2022-10-28 VITALS
OXYGEN SATURATION: 97 % | BODY MASS INDEX: 30.86 KG/M2 | HEIGHT: 68 IN | SYSTOLIC BLOOD PRESSURE: 127 MMHG | WEIGHT: 203.6 LBS | HEART RATE: 87 BPM | RESPIRATION RATE: 18 BRPM | DIASTOLIC BLOOD PRESSURE: 65 MMHG | TEMPERATURE: 98.7 F

## 2022-10-28 LAB
A/G RATIO: 0.9 (ref 1.1–2.2)
ALBUMIN SERPL-MCNC: 2.7 G/DL (ref 3.4–5)
ALP BLD-CCNC: 93 U/L (ref 40–129)
ALT SERPL-CCNC: 46 U/L (ref 10–40)
ANION GAP SERPL CALCULATED.3IONS-SCNC: 7 MMOL/L (ref 3–16)
AST SERPL-CCNC: 45 U/L (ref 15–37)
BASOPHILS ABSOLUTE: 0 K/UL (ref 0–0.2)
BASOPHILS RELATIVE PERCENT: 0.3 %
BILIRUB SERPL-MCNC: 0.5 MG/DL (ref 0–1)
BUN BLDV-MCNC: 31 MG/DL (ref 7–20)
CALCIUM SERPL-MCNC: 8.5 MG/DL (ref 8.3–10.6)
CHLORIDE BLD-SCNC: 96 MMOL/L (ref 99–110)
CO2: 32 MMOL/L (ref 21–32)
CREAT SERPL-MCNC: 0.7 MG/DL (ref 0.8–1.3)
EOSINOPHILS ABSOLUTE: 0.1 K/UL (ref 0–0.6)
EOSINOPHILS RELATIVE PERCENT: 1.3 %
GFR SERPL CREATININE-BSD FRML MDRD: >60 ML/MIN/{1.73_M2}
GLUCOSE BLD-MCNC: 127 MG/DL (ref 70–99)
HCT VFR BLD CALC: 25.6 % (ref 40.5–52.5)
HEMOGLOBIN: 8.5 G/DL (ref 13.5–17.5)
LYMPHOCYTES ABSOLUTE: 0.6 K/UL (ref 1–5.1)
LYMPHOCYTES RELATIVE PERCENT: 5.8 %
MAGNESIUM: 2 MG/DL (ref 1.8–2.4)
MCH RBC QN AUTO: 29.7 PG (ref 26–34)
MCHC RBC AUTO-ENTMCNC: 33.2 G/DL (ref 31–36)
MCV RBC AUTO: 89.4 FL (ref 80–100)
MONOCYTES ABSOLUTE: 0.9 K/UL (ref 0–1.3)
MONOCYTES RELATIVE PERCENT: 8.7 %
NEUTROPHILS ABSOLUTE: 9.1 K/UL (ref 1.7–7.7)
NEUTROPHILS RELATIVE PERCENT: 83.9 %
PDW BLD-RTO: 16.4 % (ref 12.4–15.4)
PLATELET # BLD: 411 K/UL (ref 135–450)
PMV BLD AUTO: 7.6 FL (ref 5–10.5)
POTASSIUM SERPL-SCNC: 4.8 MMOL/L (ref 3.5–5.1)
RBC # BLD: 2.86 M/UL (ref 4.2–5.9)
SODIUM BLD-SCNC: 135 MMOL/L (ref 136–145)
TOTAL PROTEIN: 5.7 G/DL (ref 6.4–8.2)
WBC # BLD: 10.8 K/UL (ref 4–11)

## 2022-10-28 PROCEDURE — 2700000000 HC OXYGEN THERAPY PER DAY

## 2022-10-28 PROCEDURE — 80053 COMPREHEN METABOLIC PANEL: CPT

## 2022-10-28 PROCEDURE — 6360000002 HC RX W HCPCS: Performed by: INTERNAL MEDICINE

## 2022-10-28 PROCEDURE — 83735 ASSAY OF MAGNESIUM: CPT

## 2022-10-28 PROCEDURE — 36415 COLL VENOUS BLD VENIPUNCTURE: CPT

## 2022-10-28 PROCEDURE — 6370000000 HC RX 637 (ALT 250 FOR IP): Performed by: NURSE PRACTITIONER

## 2022-10-28 PROCEDURE — 99223 1ST HOSP IP/OBS HIGH 75: CPT | Performed by: PSYCHIATRY & NEUROLOGY

## 2022-10-28 PROCEDURE — 6370000000 HC RX 637 (ALT 250 FOR IP): Performed by: INTERNAL MEDICINE

## 2022-10-28 PROCEDURE — 85025 COMPLETE CBC W/AUTO DIFF WBC: CPT

## 2022-10-28 PROCEDURE — 6370000000 HC RX 637 (ALT 250 FOR IP)

## 2022-10-28 PROCEDURE — C9113 INJ PANTOPRAZOLE SODIUM, VIA: HCPCS | Performed by: INTERNAL MEDICINE

## 2022-10-28 PROCEDURE — 94761 N-INVAS EAR/PLS OXIMETRY MLT: CPT

## 2022-10-28 RX ORDER — LISINOPRIL 5 MG/1
5 TABLET ORAL DAILY
Qty: 30 TABLET | Refills: 0
Start: 2022-10-28 | End: 2022-11-27

## 2022-10-28 RX ORDER — CASTOR OIL AND BALSAM, PERU 788; 87 MG/G; MG/G
OINTMENT TOPICAL 2 TIMES DAILY
Status: DISCONTINUED | OUTPATIENT
Start: 2022-10-28 | End: 2022-10-28 | Stop reason: HOSPADM

## 2022-10-28 RX ADMIN — TAMSULOSIN HYDROCHLORIDE 0.4 MG: 0.4 CAPSULE ORAL at 09:07

## 2022-10-28 RX ADMIN — HEPARIN SODIUM 5000 UNITS: 5000 INJECTION INTRAVENOUS; SUBCUTANEOUS at 05:52

## 2022-10-28 RX ADMIN — FERROUS SULFATE TAB 325 MG (65 MG ELEMENTAL FE) 325 MG: 325 (65 FE) TAB at 09:07

## 2022-10-28 RX ADMIN — LEVOTHYROXINE SODIUM 125 MCG: 0.12 TABLET ORAL at 05:52

## 2022-10-28 RX ADMIN — METOPROLOL TARTRATE 25 MG: 25 TABLET, FILM COATED ORAL at 09:07

## 2022-10-28 RX ADMIN — LISINOPRIL 5 MG: 5 TABLET ORAL at 09:07

## 2022-10-28 RX ADMIN — PANTOPRAZOLE SODIUM 40 MG: 40 INJECTION, POWDER, FOR SOLUTION INTRAVENOUS at 09:07

## 2022-10-28 RX ADMIN — ASPIRIN 81 MG 81 MG: 81 TABLET ORAL at 09:07

## 2022-10-28 RX ADMIN — DOCUSATE SODIUM 100 MG: 50 LIQUID ORAL at 09:07

## 2022-10-28 NOTE — CARE COORDINATION
CASE MANAGEMENT DISCHARGE SUMMARY      Discharge to: [facility]    Precertification completed: Yes with [facility] Exemption Notification (HENS) completed: Yes    IMM given: (date) today    [address] ordered/agency: defer to SNF    Transportation: Ambulance      Medical Transport explained to pt/family. Pt/family voice no agency preference.   No preference  Agency used:[facility]   time:2:30 PM   Ambulance form completed: Yes    Confirmed discharge plan with:MD/RN/Sister Muriel     Patient: yes     Family:  yes   Name:Muriel FULLER  Contact number:[phone]     Facility/Agency, name:  [facility] faxed   Phone number for report to facility: [phone]     RN, name: Kandy Roy

## 2022-10-28 NOTE — CONSULTS
[facility] Nurse  Consult Note       NAME:  Eloisa Jolly  MEDICAL RECORD NUMBER:  [id number]  AGE: 78 y.o. GENDER: male  : 1945  TODAY'S DATE:  10/28/2022    Subjective; Ya, I can roll if you help. Reason for WOCN Evaluation and Assessment: dti to coccyx      Eloisa Jolly is a 78 y.o. male referred by:   [] Physician  [x] Nursing  [] Other:     Wound Identification:  Wound Type: pressure & shearing  Contributing Factors: chronic pressure, incontinence of stool, and incontinence of urine    Wound History: 78 y.o. male. Patient has a h/o schizoaffective disorder. admitted to [facility]  in [place], [place] for hallucinations and impulsive behavior. He was there for ten days then transferred to the [facility] inpatient psychiatry unit on 10/11. Around 9pm on 10/15 a code blue was called because the patient briefly went unresponsive. He had been bradycardic all day  He had been taking metoprolol chronically but this had been held throughout the day because of his bradycardia  Current Wound Care Treatment:  [facility]    Patient Goal of Care:  [x] Wound Healing  [] Odor Control  [] Palliative Care  [] Pain Control   [] Other:         PAST MEDICAL HISTORY        Diagnosis Date    Acute kidney injury ([address]     Anemia     Hx of     Arthritis     Atrial fibrillation ([address]     Atrial flutter ([address]     converted to NSR, Poor candidate for anticoagulation. CAD (coronary artery disease)     Cardiomyopathy ([address]     ? Nonishcemic, Echo 10/2014 LVEF 25-30%.      Cellulitis of right upper extremity     CHF (congestive heart failure) ([facility])     systolic    CKD (chronic kidney disease)     Cr 2.3 10/2014, not on ACE/ARB due to CKD    Hypertension     Hypovolemia     PVC's (premature ventricular contractions)     secondary to hypokalemia    Schizophrenia (HCC)     hx of    Seizures ([facility])     Urinary tract infection        PAST SURGICAL HISTORY    Past Surgical History:   Procedure Laterality Date    APPENDECTOMY      BRONCHOSCOPY N/A 10/17/2022    BRONCHOSCOPY ALVEOLAR LAVAGE performed by Mary Dejesus MD at [facility]  10/17/2022    BRONCHOSCOPY THERAPUTIC ASPIRATION INITIAL performed by Mary Dejesus MD at [facility] N/A 10/19/2022    BRONCHOSCOPY DIAGNOSTIC OR CELL [address] performed by Mary Dejesus MD at [place] [address]    Family History   Problem Relation Age of Onset    Arthritis Mother     Heart Disease Father        SOCIAL HISTORY    Social History     Tobacco Use    Smoking status: Former     Packs/day: 3.00     Years: 20.00     Pack years: 60.00     Types: Cigarettes   Vaping Use    Vaping Use: Unknown   Substance Use Topics    Alcohol use: No    Drug use: No       ALLERGIES    Allergies   Allergen Reactions    Penicillins        MEDICATIONS    No current facility-administered medications on file prior to encounter. Current Outpatient Medications on File Prior to Encounter   Medication Sig Dispense Refill    mirtazapine (REMERON) 15 MG tablet Take 7.5 mg by mouth nightly      docusate sodium (COLACE) 100 MG capsule Take 100 mg by mouth daily      aspirin 325 MG EC tablet Take 325 mg by mouth daily. ferrous sulfate 325 (65 FE) MG tablet Take 325 mg by mouth daily (with breakfast). tamsulosin (FLOMAX) 0.4 MG capsule Take 0.4 mg by mouth at bedtime      simvastatin (ZOCOR) 40 MG tablet Take 20 mg by mouth nightly. levothyroxine (SYNTHROID) 125 MCG tablet Take 125 mcg by mouth Daily      ascorbic acid (VITAMIN C) 500 MG tablet Take 500 mg by mouth daily. Nutritional Supplements (ARGINAID EXTRA) LIQD by Does not apply route. potassium chloride (KLOR-CON M) 10 MEQ extended release tablet Take 20 mEq by mouth 2 times daily.       furosemide (LASIX) 40 MG tablet Take 40 mg by mouth daily      Multiple Vitamins-Minerals (THERAPEUTIC MULTIVITAMIN-MINERALS) tablet Take 1 tablet by mouth 2 times daily. pantoprazole (PROTONIX) 40 MG tablet Take 40 mg by mouth 2 times daily      OLANZapine zydis (ZYPREXA) 20 MG disintegrating tablet Take 20 mg by mouth nightly      melatonin 3 MG TABS tablet Take 9 mg by mouth nightly as needed (sleep)      acetaminophen (TYLENOL) 325 MG tablet Take 650 mg by mouth every 6 hours as needed for Pain or Fever         Objective; lying in bed. /69   Pulse 80   Temp 98.9 °F (37.2 °C) (Oral)   Resp 16   Ht 5' 8\" (1.727 m)   Wt 203 lb 9.6 oz (92.4 kg)   SpO2 95%   BMI 30.96 kg/m²     LABS:  WBC:    Lab Results   Component Value Date/Time    WBC 10.8 10/28/2022 04:35 AM     H/H:    Lab Results   Component Value Date/Time    HGB 8.5 10/28/2022 04:35 AM    HCT 25.6 10/28/2022 04:35 AM     PTT:  No results found for: APTT, PTT[APTT}  PT/INR:    Lab Results   Component Value Date/Time    PROTIME 15.0 10/22/2022 04:31 AM    INR 1.18 10/22/2022 04:31 AM     HgBA1c:    Lab Results   Component Value Date/Time    LABA1C 5.9 10/11/2022 03:56 PM       Assessment; buttocks dry, discolored mauve, signs of shearing noted.     Alvin Risk Score: Alvin Scale Score: 13    Patient Active Problem List   Diagnosis    CHF (congestive heart failure) (HCC)    PVC's (premature ventricular contractions)    Atrial flutter (HCC)    Cardiomyopathy (HCC)    Schizophrenia (HCC)    CKD (chronic kidney disease)    Long term current use of amiodarone    Schizoaffective disorder (HCC)    Hypercholesteremia    New onset seizure ([address]    Other specified hypothyroidism    Paroxysmal atrial fibrillation (HCC)    Benign prostatic hyperplasia, presence of lower urinary tract symptoms unspecified    Dementia (HCC)    Complete heart block (HCC)    Systolic heart failure (HCC)    LBBB (left bundle branch block)    Non-ischemic cardiomyopathy (HCC)    Symptomatic bradycardia    AUSTIN (acute kidney injury) ([address]    Pulmonary infiltrates    Shock circulatory ([address] Normocytic hypochromic anemia    Observed seizure-like activity (HCC)    Acute respiratory failure with hypoxia (HCC)    Pyuria    Aspiration into airway    Proteus mirabilis infection    Staphylococcal pneumonia (HCC)    Closed traumatic subcoracoid dislocation of right shoulder joint    PVC (premature ventricular contraction)       Measurements:  Wound 10/27/22 Coccyx (Active)   Wound Image   10/28/22 1214   Wound Etiology Other 10/28/22 1214   Dressing Status Reinforced dressing 10/28/22 1214   Wound Cleansed Cleansed with saline 10/28/22 1214   Dressing/Treatment Pharmaceutical agent (see MAR) 10/28/22 1214   Offloading for Diabetic Foot Ulcers Offloading ordered 10/28/22 1214   Dressing Change Due 10/28/22 10/28/22 1214   Wound Assessment Non-blanchable erythema 10/28/22 1214   Drainage Amount None 10/28/22 1214   Odor None 10/28/22 1214   Kerry-wound Assessment Dry/flaky 10/28/22 1214   Margins Undefined edges 10/28/22 1214   Number of days: 1              Response to treatment:  Well tolerated by patient. Pain Assessment:  Severity:  0 / 10  Quality of pain: N/A  Wound Pain Timing/Severity: none  Premedicated: No    Plan   Plan of Care: Wound 10/27/22 Coccyx-Dressing/Treatment: Pharmaceutical agent (see MAR) (Venelex)      Recommend;  Cleanse BID bi lateral buttocks with normal saline/ or bath wipes, pat dry, & apply Venelex to bi lateral buttocks discolored areas. Patient to be discharged today. Specialty Bed Required : Yes   [x] Low Air Loss   [x] Pressure Redistribution  [] Fluid Immersion  [] Bariatric  [] Total Pressure Relief  [] Other:     Current Diet: ADULT DIET;  Dysphagia - Pureed  ADULT ORAL NUTRITION SUPPLEMENT; Lunch, Dinner; Frozen Oral Supplement  Dietician consult:  Yes    Discharge Plan:  Placement for patient upon discharge: intermediate care facility    Patient appropriate for Outpatient [address]: No    Referrals:  [x]  / discharge planner  following  [] Home Health Care  [] Supplies  [] Other    Patient/Caregiver Teaching:  Level of patient/caregiver understanding able to:   [] Indicates understanding       [] Needs reinforcement  [] Unsuccessful      [] Verbal Understanding  [] Demonstrated understanding       [] No evidence of learning  [] Refused teaching         [x] N/A       Electronically signed by Jarrell Nobles RN, BSN on 10/28/2022 at 12:17 PM

## 2022-10-28 NOTE — CARE COORDINATION
Arranged transport with [facility] at 2:30 PM . Shankar served Loramaureen Gordillo for d/c orders. Spoke with admissions at  [facility] or [facility] will be here to do onsite visit this AM and give fax and number to call report to writer. Sister Muriel notified and patient notified. Rapid covid ordered.

## 2022-10-28 NOTE — DISCHARGE INSTR - COC
Continuity of Care Form    Patient Name: Charis Wolf   :  1945  MRN:  [id number]    Admit date:  10/15/2022  Discharge date:  10/28/2022    Code Status Order: Full Code   Advance Directives:     Admitting Physician:  Hoa Curtis MD  PCP: [facility]    Discharging Nurse: [facility] Unit/Room#: 1917/8476-58  Discharging Unit Phone Number: [phone]    Emergency Contact:   Extended Emergency Contact Information  Primary Emergency Contact: Muriel Fernandez (POA)  Home Phone: [phone]  Relation: Brother/Sister    Past Surgical History:  Past Surgical History:   Procedure Laterality Date    APPENDECTOMY      BRONCHOSCOPY N/A 10/17/2022    BRONCHOSCOPY ALVEOLAR LAVAGE performed by Irina Banks MD at [facility]  10/17/2022    BRONCHOSCOPY THERAPUTIC ASPIRATION INITIAL performed by Irina Banks MD at [facility] N/A 10/19/2022    BRONCHOSCOPY DIAGNOSTIC OR CELL [address] performed by Irina Banks MD at [address] [place] [address]         Immunization History: There is no immunization history on file for this patient.     Active Problems:  Patient Active Problem List   Diagnosis Code    CHF (congestive heart failure) ([facility]) I50.9    PVC's (premature ventricular contractions) I49.3    Atrial flutter (HCC) I48.92    Cardiomyopathy (HCC) I42.9    Schizophrenia (HCC) F20.9    CKD (chronic kidney disease) N18.9    Long term current use of amiodarone Z79.899    Schizoaffective disorder (HCC) F25.9    Hypercholesteremia E78.00    New onset seizure ([address] R56.9    Other specified hypothyroidism E03.8    Paroxysmal atrial fibrillation (HCC) I48.0    Benign prostatic hyperplasia, presence of lower urinary tract symptoms unspecified N40.0    Dementia (HCC) F03.90    Complete heart block (HCC) K39.4    Systolic heart failure (HCC) I50.20    LBBB (left bundle branch block) I44.7    Non-ischemic cardiomyopathy ([address] I42.8 Symptomatic bradycardia R00.1    AUSTIN (acute kidney injury) ([facility] N17.9    Pulmonary infiltrates R91.8    Shock circulatory ([facility]) R57.9    Normocytic hypochromic anemia D50.9    Observed seizure-like activity ([facility]) R56.9    Acute respiratory failure with hypoxia ([facility]) J96.01    Pyuria R82.81    Aspiration into airway T17.908A    Proteus mirabilis infection A49.8    Staphylococcal pneumonia ([facility]) J15.20    Closed traumatic subcoracoid dislocation of right shoulder joint S43.014A    PVC (premature ventricular contraction) I49.3       Isolation/Infection:   Isolation            No Isolation          Patient Infection Status       Infection Onset Added Last Indicated Last Indicated By Review Planned Expiration Resolved Resolved By    None active    Resolved    COVID-19 (Rule Out) 10/11/22 10/11/22 10/11/22 COVID-19 & Influenza Combo (Ordered)   10/11/22 Rule-Out Test Resulted            Nurse Assessment:  Last Vital Signs: /69   Pulse 80   Temp 98.9 °F (37.2 °C) (Oral)   Resp 16   Ht 5' 8\" (1.727 m)   Wt 203 lb 9.6 oz (92.4 kg)   SpO2 95%   BMI 30.96 kg/m²     Last documented pain score (0-10 scale): Pain Level: 1  Last Weight:   Wt Readings from Last 1 Encounters:   10/28/22 203 lb 9.6 oz (92.4 kg)     Mental Status:  {IP PT MENTAL STATUS:02274}    IV Access:  { [facility] IV ACCESS:303886932}    Nursing Mobility/ADLs:  Walking   {CHP DME CUZD:810828477}  Transfer  {CHP DME PZEC:208227363}  Bathing  {CHP DME EXDM:329872486}  Dressing  {CHP DME MPXF:388130539}  Toileting  {CHP DME AGMQ:813780099}  Feeding  {CHP DME WAAH:810871588}  Med Admin  {CHP DME CRYF:960155212}  Med Delivery   { [facility] MED Delivery:764799598}  Measurements:  Wound 10/27/22 Coccyx (Active)   Wound Image   10/28/22 1214   Wound Etiology Other 10/28/22 1214   Dressing Status Reinforced dressing 10/28/22 1214   Wound Cleansed Cleansed with saline 10/28/22 1214   Dressing/Treatment Pharmaceutical agent (see MAR) 10/28/22 1214   Offloading for Diabetic Foot Ulcers Offloading ordered 10/28/22 1214   Dressing Change Due 10/28/22 10/28/22 1214   Wound Assessment Non-blanchable erythema 10/28/22 1214   Drainage Amount None 10/28/22 1214   Odor None 10/28/22 1214   Kerry-wound Assessment Dry/flaky 10/28/22 1214   Margins Undefined edges 10/28/22 1214   Number of days: 1              Response to treatment:  Well tolerated by patient. Plan   Plan of Care: Wound 10/27/22 Coccyx-Dressing/Treatment: Pharmaceutical agent (see MAR) (Venelex)      Recommend;  Cleanse BID bi lateral buttocks with normal saline/ or bath wipes, pat dry, & apply Venelex to bi lateral buttocks discolored areas. Elimination:  Continence: Bowel: {YES / DY:48816}  Bladder: {YES / IX:51315}  Urinary Catheter: {Urinary Catheter:046697508}   Colostomy/Ileostomy/Ileal Conduit: {YES / BA:73619}       Date of Last BM: ***    Intake/Output Summary (Last 24 hours) at 10/28/2022 1035  Last data filed at 10/28/2022 0622  Gross per 24 hour   Intake 1200 ml   Output 1460 ml   Net -260 ml     I/O last 3 completed shifts:   In: 1500 [P.O.:1500]  Out: 4915 [Urine:3195]    Safety Concerns:     [address] [employer] Safety Concerns:993441556}    Impairments/Disabilities:      [address] [employer] Impairments/Disabilities:300448038}    Nutrition Therapy:  Current Nutrition Therapy:   [address] [employer] Diet List:912506583}    Routes of Feeding: {CHP DME Other Feedings:547450188}  Liquids: {Slp liquid thickness:72327}  Daily Fluid Restriction: {CHP DME Yes amt example:285673928}  Last Modified Barium Swallow with Video (Video Swallowing Test): {Done Not Done WN}    Treatments at the Time of Hospital Discharge:   Respiratory Treatments: ***  Oxygen Therapy:  {Therapy; copd oxygen:92321}  Ventilator:    { CC Vent RSCY:228447008}    Rehab Therapies: {THERAPEUTIC INTERVENTION:9694471917}  Weight Bearing Status/Restrictions: [address] [employer]  Weight Bearin}  Other Medical Equipment (for information only, NOT a DME order): {EQUIPMENT:453503327}  Other Treatments: ***    Patient's personal belongings (please select all that are sent with patient):  {CHP DME Belongings:572089715}    RN SIGNATURE:  {Esignature:195802722}    CASE MANAGEMENT/SOCIAL WORK SECTION    Inpatient Status Date: 10/15/2022    Readmission Risk Assessment Score:  Readmission Risk              Risk of Unplanned Readmission:  23           Discharging to Facility/ Agency   Name: [facility]   Phone:[phone]  Fax:[phone]        / signature: Electronically signed by Yina Martell RN on 10/28/22 at 10:56 AM EDT    PHYSICIAN SECTION    Prognosis: Fair    Condition at Discharge: Stable    Rehab Potential (if transferring to Rehab): Fair    Recommended Labs or Other Treatments After Discharge: Follow up with PCP and Cardiology at the [place] , 2wk cardiac event monitor at [place], Oxygen 1-2 lpm n/c, Repeat bmp in 5 days, voiding trial at NH-follow up with Urology as needed, continue flomax. Physician Certification: I certify the above information and transfer of Eloisa Jolly  is necessary for the continuing treatment of the diagnosis listed and that he requires [facility] for less 30 days.      Update Admission H&P: No change in H&P    PHYSICIAN SIGNATURE:  Electronically signed by CHRISTIAN Murcia CNP on 10/28/22 at 10:38 AM EDT

## 2022-10-28 NOTE — DISCHARGE SUMMARY
Hospital Medicine Discharge Summary    Patient ID: Augusto Frames      Patient's PCP: Prisca Beltran Date: 10/15/2022     Discharge Date: 10/28/2022      Admitting Provider: Andres De La Vega MD     Discharge Provider: CHRISTIAN Johnson CNP     Discharge Diagnoses: Active Hospital Problems    Diagnosis     PVC (premature ventricular contraction) [I49.3]      Priority: Medium    Closed traumatic subcoracoid dislocation of right shoulder joint [S43.014A]      Priority: Medium    Proteus mirabilis infection [A49.8]      Priority: Medium    Staphylococcal pneumonia ([address] [J15.20]      Priority: Medium    Pyuria [R82.81]      Priority: Medium    Aspiration into airway [T17.908A]      Priority: Medium    Complete heart block (HCC) [I44.2]      Priority: Medium    Systolic heart failure (HCC) [I50.20]      Priority: Medium    Symptomatic bradycardia [R00.1]      Priority: Medium    AUSTIN (acute kidney injury) ([address] [N17.9]      Priority: Medium    Pulmonary infiltrates [R91.8]      Priority: Medium    Shock circulatory ([address] [R57.9]      Priority: Medium    Normocytic hypochromic anemia [D50.9]      Priority: Medium    Observed seizure-like activity ([address] [R56.9]      Priority: Medium    Acute respiratory failure with hypoxia (HCC) [J96.01]      Priority: Medium    Paroxysmal atrial fibrillation (HCC) [I48.0]      Priority: Medium    Schizoaffective disorder (HCC) [F25.9]      Priority: Medium    Cardiomyopathy ([address] [I42.9]        The patient was seen and examined on day of discharge and this discharge summary is in conjunction with any daily progress note from day of discharge. Hospital Course:     78 y.o. male. Patient has a h/o schizoaffective disorder. He lives in [place], [place]. He receives most of his medical care through the [facility] in [place]. He was initially admitted to [facility]  in [place], [place] for hallucinations and impulsive behavior. He was there for ten days then transferred to the [facility] geriatric inpatient psychiatry unit on 10/11. Around 9pm on 10/15 a code blue was called because the patient briefly went unresponsive. He had been bradycardic all day  He had been taking metoprolol chronically but this had been held throughout the day because of his bradycardia. His nurse was trying to check his vitals again as it appeared he was breathing abnormally. While vital signs were being checked the patient lost consciousness briefly (less than a minute). He awoke but was markedly bradycardic. He was sent to the ER where he was found to be hypothermic, hypotensive, and bradycardic to the 20s in complete heart block. He was transferred to [facility] for urgent transvenous pacing lead placement. Patient had a h/o chronic systolic HF, thought to be nonischemic, and paroxysmal afib. The patient required sedation during temporary pacing lead placement and then became agitated thereafter. He required restraints and was given IM geodon, so was not meaningfully interactive or able to provide any history. According to Dr. Laura Link notes the patient had already been difficult to understand at baseline. Right shoulder dislocation  Incidentally chest x-ray showed right shoulder dislocation. Confirmed by right shoulder x-ray. Orthopedic surgeon consulted-plan for conservative management. Acute hypoxemic respiratory failure due to pneumonia and acute on chronic systolic heart failure: Patient required intubation and mechanical ventilation. required mechanical ventilator   S/p extubation on 10/20/22  Now on 2 L via NC. Continue speech and swallow therapy:   SLP recommended MBSS-IDDSI 4 Puree Solids; IDDSI 0 Thin Liquids;  Meds crushed in puree as able  PT OT consult   Tolerating oral diet-will dc NGT/TF discussed with Dietitian  Resp cx:staph aureus-completed 7 days abx     Pneumonia  Initially treated with IV cefepime and now on  IV ceftriaxone-completed     Bradycardia, Complete heart block with junctional escape on admission s/p temporary venous pacemaker   Off dopamine drip. Cardiology on board. Anemia-LILY, iron studies reviewed. On Iron supplement. Abnormal LFT  improving     AUSTIN  Monitor GFR, avoid nephrotoxic agent, voiding trial     Hypothermia  Resolved     UTI-POA, proteus mirabilis, completed Rocephin     Acute on chronic Systolic HF  -  TTE 5/8/31:  LVEF = 40-45%, New EF 35%  C/w current meds, I&O, HF orderset, BNP 5,077     Schizoaffective D/O  -  Continue home olanzapine 20mg nightly, trazodone 50mg nightly. Psych consulted    Physical Exam Performed:     /65   Pulse 87   Temp 98.7 °F (37.1 °C) (Oral)   Resp 18   Ht 5' 8\" (1.727 m)   Wt 203 lb 9.6 oz (92.4 kg)   SpO2 97%   BMI 30.96 kg/m²     General appearance: Alert, gen weak, NAD  HEENT: Pupils equal, round, and reactive to light. Conjunctivae/corneas clear. Neck: Supple, with full range of motion. No jugular venous distention. Trachea midline. Respiratory:  Normal respiratory effort. Diminished BS  Cardiovascular: Regular rate and rhythm with normal S1/S2 without murmurs, rubs or gallops. Abdomen: Soft, non-tender, non-distended with normal bowel sounds. Musculoskeletal: MEJIA  Skin: Skin color-pale  Neurologic:  Neurovascularly intact without any focal sensory/motor deficits. Cranial nerves: II-XII intact, grossly non-focal.  Psychiatric: Alert, limited insight  Capillary Refill: Brisk, 3 seconds, normal   Peripheral Pulses: +2 palpable, equal bilaterally       Labs:  For convenience and continuity at follow-up the following most recent labs are provided:      CBC:    Lab Results   Component Value Date/Time    WBC 10.8 10/28/2022 04:35 AM    HGB 8.5 10/28/2022 04:35 AM    HCT 25.6 10/28/2022 04:35 AM     10/28/2022 04:35 AM       Renal:    Lab Results   Component Value Date/Time     10/28/2022 04:35 AM    K 4.8 10/28/2022 04:35 AM    K 3.9 10/22/2022 04:31 AM    CL 96 10/28/2022 04:35 AM    CO2 32 10/28/2022 04:35 AM    BUN 31 10/28/2022 04:35 AM    CREATININE 0.7 10/28/2022 04:35 AM    CALCIUM 8.5 10/28/2022 04:35 AM    PHOS 3.9 10/22/2022 04:31 AM         Significant Diagnostic Studies    Radiology:   FL MODIFIED BARIUM SWALLOW W VIDEO   Final Result   No aspiration noted      Please see separate speech pathology report for full discussion of findings   and recommendations. XR SHOULDER RIGHT (MIN 2 VIEWS)   Final Result   Anterior, subcoracoid dislocation of the shoulder. Suspected multiple intra-articular calcifications. 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 XR CHEST PORTABLE   Final Result   1. Retraction of the endotracheal tube into high normal position. 2. No significant change in bibasilar airspace opacities worse on the right   due in part to passive atelectasis given at least trace bilateral pleural   effusions. Superimposed pneumonia and aspiration or aspiration is suspected   given comparison CT appearance. 3. Pulmonary vascular congestion and at least borderline cardiomegaly. 4. Incomplete evaluation of apparent right glenohumeral joint dislocation. 5. Age-indeterminate left humeral head fracture. XR ABDOMEN (KUB) (SINGLE AP VIEW)   Final Result   Distal tip of nasogastric tube is visualized in the region of the proximal   aspect of the stomach as described above. XR ABDOMEN (KUB) (SINGLE AP VIEW)   Final Result   Nasogastric tube projects in normal, intragastric position. Right basilar consolidation. CT ABDOMEN PELVIS WO CONTRAST Additional Contrast? None   Final Result   Elevation of the right hemidiaphragm with right middle lobe and right lower   lobe consolidation/atelectasis. This could represent pneumonia. Small right   pleural effusion. Small left pleural effusion with some compressive   atelectasis of the left lower lobe.       Moderate cardiomegaly with coronary artery calcifications. Calcified gallstones in the gallbladder. The gallbladder is contracted with   apparent mural calcifications, in keeping with a porcelain gallbladder. This   is associated with increased incidence of gallbladder carcinoma. Surgical   consultation recommended. Moderate amount of fecal material in the left colon, with mild rectal fecal   impaction. Findings are in keeping with constipation. 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 XR CHEST PORTABLE   Final Result   Supportive tubing projects in normal position. Low lung volumes. Right basilar opacity, indeterminate for atelectasis or pneumonia/aspiration   pneumonitis. XR CHEST PORTABLE   Final Result   Endotracheal tube tip is 3 cm above the ade. Enteric tube tip is within   the stomach with side port just below the gastroesophageal junction. XR ABDOMEN (KUB) (SINGLE AP VIEW)   Final Result   Endotracheal tube tip is 3 cm above the ade. Enteric tube tip is within   the stomach with side port just below the gastroesophageal junction.                 Consults:     IP CONSULT TO CRITICAL CARE  IP CONSULT TO DIETITIAN  IP CONSULT TO ORTHOPEDIC SURGERY  IP CONSULT TO HEART FAILURE NURSE/COORDINATOR  IP CONSULT TO DIETITIAN  IP CONSULT TO PSYCHIATRY    Disposition:  [place]    Condition at Discharge: Stable    Discharge Instructions/Follow-up:  See AVS/SURINDER    Code Status:  Prior     Activity: activity as tolerated    Diet:  Dysphagia - Pureed      Discharge Medications:     Discharge Medication List as of 10/28/2022  2:36 PM             Details   metoprolol tartrate (LOPRESSOR) 25 MG tablet Take 1 tablet by mouth 2 times daily, Disp-60 tablet, R-0NO PRINT                Details   lisinopril (PRINIVIL;ZESTRIL) 5 MG tablet Take 1 tablet by mouth daily, Disp-30 tablet, R-0NO PRINT                Details   mirtazapine (REMERON) 15 MG tablet Take 7.5 mg by mouth nightlyHistorical Med      docusate sodium (COLACE) 100 MG capsule Take 100 mg by mouth dailyHistorical Med      aspirin 325 MG EC tablet Take 325 mg by mouth daily. ferrous sulfate 325 (65 FE) MG tablet Take 325 mg by mouth daily (with breakfast). tamsulosin (FLOMAX) 0.4 MG capsule Take 0.4 mg by mouth at bedtimeHistorical Med      simvastatin (ZOCOR) 40 MG tablet Take 20 mg by mouth nightly. Historical Med      levothyroxine (SYNTHROID) 125 MCG tablet Take 125 mcg by mouth DailyHistorical Med      ascorbic acid (VITAMIN C) 500 MG tablet Take 500 mg by mouth daily. Nutritional Supplements (ARGINAID EXTRA) LIQD by Does not apply route. potassium chloride (KLOR-CON M) 10 MEQ extended release tablet Take 20 mEq by mouth 2 times daily. Historical Med      furosemide (LASIX) 40 MG tablet Take 40 mg by mouth dailyHistorical Med      Multiple Vitamins-Minerals (THERAPEUTIC MULTIVITAMIN-MINERALS) tablet Take 1 tablet by mouth 2 times daily. pantoprazole (PROTONIX) 40 MG tablet Take 40 mg by mouth 2 times dailyHistorical Med      OLANZapine zydis (ZYPREXA) 20 MG disintegrating tablet Take 20 mg by mouth nightlyHistorical Med      melatonin 3 MG TABS tablet Take 9 mg by mouth nightly as needed (sleep)Historical Med      acetaminophen (TYLENOL) 325 MG tablet Take 650 mg by mouth every 6 hours as needed for Pain or FeverHistorical Med             Time Spent on discharge is more than 30 minutes in the examination, evaluation, counseling and review of medications and discharge plan. Signed:    CHRISTIAN Loyd CNP   11/1/2022      Thank you [facility] [employer] for the opportunity to be involved in this patient's care. If you have any questions or concerns, please feel free to contact me at [phone].

## 2022-11-16 NOTE — CONSULTS
[facility]                 Arpita Mancuso                                   CONSULTATION    PATIENT NAME: Lexus Vazquez                     :        1945  MED REC NO:   [id number]                          ROOM:       0430  ACCOUNT NO:   [de-identified]                           ADMIT DATE: 10/15/2022  PROVIDER:     Johanna Montelongo MD    CONSULT DATE:  10/25/2022    PSYCHIATRY CONSULTATION    REQUESTING PHYSICIAN:  Dr. Maile Stewart. HISTORY OF PRESENT ILLNESS:  The patient is a 24-year-old who had been  transferred to [facility] after having been on the [facility] at [facility] where he saw Dr. Fauzia Cowan. I did review that chart, his part of  his workup and also spoke to his sister, I have worked with her in the  past.  He also had other family present and the patient has been living  in [place], [place] with his developmentally delayed brother in a  [facility]. He has a long history of having been diagnosed with  schizoaffective disorder, more recently complicated with vascular  dementia and was having hallucinations and delusions and was at  [facility] unit near [place], then was apparently transferred  at the sister's request to [facility]. There, he developed  bradycardia down to the 20s with incomplete heart block and was  transferred here for ongoing medical care. Psychiatry was asked to  reevaluate any risk of suicidality and make recommendations. The patient was lying in his bed and he is awake and alert although it  was difficult to understand because of aphasia that was going on before  all of these recent events. He had been on Zyprexa for a very long  period of time and apparently the facility near [place] stopped  that in favor of something else, but his mental health has declined  since then and Dr. Fauzia Cowan at [facility] restarted it and he is  currently taking 20 mg at bedtime.   The patient Today's visit was performed with the assistance of  Services. Name of : Johnnie Apley #[id number]   Service used: Phone : Third Party     Left message for patient to call back regarding the Statin Dose Optimization Program.    Kurtis Denise, PharmD, [address] [facility] Clinic Medina HospitalJordana [email or web address]  [phone] was noted to be actively  hallucinating although his psychiatric situation was complicated by his  medical problems including a UTI that was treated. PAST PSYCHIATRIC HISTORY:  The patient does not have any past history of  suicide attempts, although reportedly may have overtaken his Remeron in  a suicide attempt that lead to the hospitalization in [place]. He does  have psychiatric hospital stay. FAMILY PSYCHIATRIC HISTORY:  Negative for completed suicides. SOCIAL HISTORY:  As started above. The patient does not have any  history of alcohol or drug dependency issues. He lives with his brother  as noted above. He does have lot of support within the community from  his family. He has no current legal issues pending. Review of the  chart, there was no documentation of any trauma history. PHYSICAL EXAMINATION:  MENTAL STATUS EXAM:  The patient appears as little bit older than his  started age. He is awake and makes good eye contact. He does attempt  to speak although it is difficult to interpret his speech with his  current aphasia. He did not admit to having auditory or visual  hallucination, although the staff hear occasionally reported that they  felt he was reporting to these. In terms of his mood, he is not  irritable or agitated. He shock his head no when asked regarding  suicidal or homicidal.  He certainly is alert, although it is difficult  to assess his memory or his orientation with some other speech  struggles. His general fund of knowledge is adequate. Attention span  seems impaired but it is tolerable. His insight and judgement limited. DIAGNOSES:  AXIS I:  1. Schizoaffective disorder - bipolar type. 2.  Dementia - vascular type. AXIS II:  No diagnosis. AXIS III:  1. Bradycardia - resolved. AXIS IV:  Severe. AXIS V:  Current GAF 45 to 50, highest in the past year 61. RECOMMENDATIONS:  1.  I do not think the patient currently represents any acute risks for  suicide.
